# Patient Record
Sex: MALE | Race: WHITE | Employment: OTHER | URBAN - NONMETROPOLITAN AREA
[De-identification: names, ages, dates, MRNs, and addresses within clinical notes are randomized per-mention and may not be internally consistent; named-entity substitution may affect disease eponyms.]

---

## 2017-01-21 ENCOUNTER — HOSPITAL ENCOUNTER (EMERGENCY)
Age: 67
Discharge: HOME | End: 2017-01-21
Attending: EMERGENCY MEDICINE
Payer: MEDICARE

## 2017-01-21 ENCOUNTER — APPOINTMENT (OUTPATIENT)
Dept: GENERAL RADIOLOGY | Age: 67
End: 2017-01-21
Payer: MEDICARE

## 2017-01-21 VITALS
TEMPERATURE: 97.9 F | SYSTOLIC BLOOD PRESSURE: 123 MMHG | WEIGHT: 205 LBS | HEART RATE: 95 BPM | DIASTOLIC BLOOD PRESSURE: 58 MMHG | RESPIRATION RATE: 22 BRPM | OXYGEN SATURATION: 95 % | HEIGHT: 67 IN | BODY MASS INDEX: 32.18 KG/M2

## 2017-01-21 DIAGNOSIS — K56.41 FECAL IMPACTION (HCC): Primary | ICD-10-CM

## 2017-01-21 PROCEDURE — 74000 XR ABDOMEN LIMITED (KUB): CPT

## 2017-01-21 PROCEDURE — 96374 THER/PROPH/DIAG INJ IV PUSH: CPT

## 2017-01-21 PROCEDURE — 99283 EMERGENCY DEPT VISIT LOW MDM: CPT | Performed by: EMERGENCY MEDICINE

## 2017-01-21 PROCEDURE — 99283 EMERGENCY DEPT VISIT LOW MDM: CPT

## 2017-01-21 PROCEDURE — 96375 TX/PRO/DX INJ NEW DRUG ADDON: CPT

## 2017-01-21 PROCEDURE — 6360000002 HC RX W HCPCS: Performed by: EMERGENCY MEDICINE

## 2017-01-21 RX ORDER — KETOROLAC TROMETHAMINE 30 MG/ML
30 INJECTION, SOLUTION INTRAMUSCULAR; INTRAVENOUS ONCE
Status: COMPLETED | OUTPATIENT
Start: 2017-01-21 | End: 2017-01-21

## 2017-01-21 RX ORDER — MORPHINE SULFATE 4 MG/ML
4 INJECTION, SOLUTION INTRAMUSCULAR; INTRAVENOUS ONCE
Status: COMPLETED | OUTPATIENT
Start: 2017-01-21 | End: 2017-01-21

## 2017-01-21 RX ADMIN — KETOROLAC TROMETHAMINE 30 MG: 30 INJECTION, SOLUTION INTRAMUSCULAR at 13:16

## 2017-01-21 RX ADMIN — MORPHINE SULFATE 4 MG: 4 INJECTION, SOLUTION INTRAMUSCULAR; INTRAVENOUS at 13:17

## 2017-01-21 ASSESSMENT — ENCOUNTER SYMPTOMS
ABDOMINAL PAIN: 0
CONSTIPATION: 1
VOMITING: 0
NAUSEA: 0
BACK PAIN: 1

## 2017-01-21 ASSESSMENT — PAIN SCALES - GENERAL
PAINLEVEL_OUTOF10: 7
PAINLEVEL_OUTOF10: 7

## 2017-01-21 ASSESSMENT — PAIN DESCRIPTION - LOCATION: LOCATION: RECTUM

## 2017-01-21 ASSESSMENT — PAIN DESCRIPTION - PAIN TYPE: TYPE: ACUTE PAIN

## 2017-03-21 ENCOUNTER — NURSE ONLY (OUTPATIENT)
Dept: PRIMARY CARE CLINIC | Age: 67
End: 2017-03-21
Payer: MEDICARE

## 2017-03-21 DIAGNOSIS — E78.00 ELEVATED CHOLESTEROL: Primary | ICD-10-CM

## 2017-03-21 LAB
CHOLESTEROL, TOTAL: 198 MG/DL (ref 160–199)
HDLC SERPL-MCNC: 35 MG/DL (ref 55–121)
LDL CHOLESTEROL CALCULATED: 131 MG/DL
TRIGL SERPL-MCNC: 161 MG/DL (ref 150–199)

## 2017-03-21 PROCEDURE — 36415 COLL VENOUS BLD VENIPUNCTURE: CPT | Performed by: NURSE PRACTITIONER

## 2017-08-09 RX ORDER — AMLODIPINE BESYLATE 5 MG/1
TABLET ORAL
Qty: 90 TABLET | Refills: 3 | Status: SHIPPED | OUTPATIENT
Start: 2017-08-09 | End: 2018-06-04 | Stop reason: SDUPTHER

## 2017-08-09 RX ORDER — BENAZEPRIL HYDROCHLORIDE 20 MG/1
TABLET ORAL
Qty: 90 TABLET | Refills: 3 | Status: SHIPPED | OUTPATIENT
Start: 2017-08-09 | End: 2018-06-04 | Stop reason: SDUPTHER

## 2017-09-21 ENCOUNTER — OFFICE VISIT (OUTPATIENT)
Dept: PRIMARY CARE CLINIC | Age: 67
End: 2017-09-21
Payer: MEDICARE

## 2017-09-21 VITALS
OXYGEN SATURATION: 97 % | BODY MASS INDEX: 31.86 KG/M2 | TEMPERATURE: 98.3 F | DIASTOLIC BLOOD PRESSURE: 78 MMHG | HEIGHT: 67 IN | WEIGHT: 203 LBS | SYSTOLIC BLOOD PRESSURE: 132 MMHG | HEART RATE: 86 BPM

## 2017-09-21 DIAGNOSIS — Z13.6 ENCOUNTER FOR LIPID SCREENING FOR CARDIOVASCULAR DISEASE: ICD-10-CM

## 2017-09-21 DIAGNOSIS — Z12.5 SCREENING FOR PROSTATE CANCER: ICD-10-CM

## 2017-09-21 DIAGNOSIS — Z00.00 ROUTINE GENERAL MEDICAL EXAMINATION AT A HEALTH CARE FACILITY: ICD-10-CM

## 2017-09-21 DIAGNOSIS — I10 ESSENTIAL HYPERTENSION: ICD-10-CM

## 2017-09-21 DIAGNOSIS — Z13.1 SCREENING FOR DIABETES MELLITUS: ICD-10-CM

## 2017-09-21 DIAGNOSIS — R01.1 MURMUR, CARDIAC: Primary | ICD-10-CM

## 2017-09-21 DIAGNOSIS — Z13.220 ENCOUNTER FOR LIPID SCREENING FOR CARDIOVASCULAR DISEASE: ICD-10-CM

## 2017-09-21 LAB
ALBUMIN SERPL-MCNC: 4.3 G/DL (ref 3.5–5.2)
ALP BLD-CCNC: 86 U/L (ref 40–130)
ALT SERPL-CCNC: 22 U/L (ref 5–41)
ANION GAP SERPL CALCULATED.3IONS-SCNC: 13 MMOL/L (ref 7–19)
AST SERPL-CCNC: 21 U/L (ref 5–40)
BILIRUB SERPL-MCNC: 0.4 MG/DL (ref 0.2–1.2)
BUN BLDV-MCNC: 23 MG/DL (ref 8–23)
CALCIUM SERPL-MCNC: 9.5 MG/DL (ref 8.8–10.2)
CHLORIDE BLD-SCNC: 101 MMOL/L (ref 98–111)
CHOLESTEROL, TOTAL: 201 MG/DL (ref 160–199)
CO2: 25 MMOL/L (ref 22–29)
CREAT SERPL-MCNC: 0.9 MG/DL (ref 0.5–1.2)
GFR NON-AFRICAN AMERICAN: >60
GLUCOSE BLD-MCNC: 100 MG/DL (ref 74–109)
HCT VFR BLD CALC: 45 % (ref 42–52)
HDLC SERPL-MCNC: 34 MG/DL (ref 55–121)
HEMOGLOBIN: 15.4 G/DL (ref 14–18)
LDL CHOLESTEROL CALCULATED: 140 MG/DL
MCH RBC QN AUTO: 31.4 PG (ref 27–31)
MCHC RBC AUTO-ENTMCNC: 34.2 G/DL (ref 33–37)
MCV RBC AUTO: 91.8 FL (ref 80–94)
PDW BLD-RTO: 12.5 % (ref 11.5–14.5)
PLATELET # BLD: 197 K/UL (ref 130–400)
PMV BLD AUTO: 10.1 FL (ref 9.4–12.4)
POTASSIUM SERPL-SCNC: 5.2 MMOL/L (ref 3.5–5)
PROSTATE SPECIFIC ANTIGEN: 0.85 NG/ML (ref 0–4)
RBC # BLD: 4.9 M/UL (ref 4.7–6.1)
SODIUM BLD-SCNC: 139 MMOL/L (ref 136–145)
TOTAL PROTEIN: 7.9 G/DL (ref 6.6–8.7)
TRIGL SERPL-MCNC: 133 MG/DL (ref 150–199)
WBC # BLD: 7.2 K/UL (ref 4.8–10.8)

## 2017-09-21 PROCEDURE — G0439 PPPS, SUBSEQ VISIT: HCPCS | Performed by: NURSE PRACTITIONER

## 2017-09-21 PROCEDURE — 36415 COLL VENOUS BLD VENIPUNCTURE: CPT | Performed by: NURSE PRACTITIONER

## 2017-09-21 NOTE — PROGRESS NOTES
medical history including lifestyle, illnesses that may run in your family, and various assessments and screenings as appropriate. After reviewing your medical record and screening and assessments performed today your provider may have ordered immunizations, labs, imaging, and/or referrals for you. A list of these orders (if applicable) as well as your Preventive Care list are included within your After Visit Summary for your review. Other Preventive Recommendations:    · A preventive eye exam performed by an eye specialist is recommended every 1-2 years to screen for glaucoma; cataracts, macular degeneration, and other eye disorders. · A preventive dental visit is recommended every 6 months. · Try to get at least 150 minutes of exercise per week or 10,000 steps per day on a pedometer . · Order or download the FREE \"Exercise & Physical Activity: Your Everyday Guide\" from The [employer]. Call 2-672.414.5404 or search The [employer] online. · You need 9511-2310 mg of calcium and 6436-7425 IU of vitamin D per day. It is possible to meet your calcium requirement with diet alone, but a vitamin D supplement is usually necessary to meet this goal.  · When exposed to the sun, use a sunscreen that protects against both UVA and UVB radiation with an SPF of 30 or greater. Reapply every 2 to 3 hours or after sweating, drying off with a towel, or swimming. · Always wear a seat belt when traveling in a car. Always wear a helmet when riding a bicycle or motorcycle. Electronically signed by Alejandra Johnson CNP on 2017 at 9:08 AM    Medicare Annual Wellness Visit  Name: Efraín Leigh Date: 2017   MRN: [id number] Sex: Male   Age: 79 y.o.  Ethnicity: Non-/Non    : 1950 Race: [address] is here for Annual Exam (Pt needs labs)    Screenings for behavioral, psychosocial and functional/safety risks, and cognitive dysfunction are all negative except as indicated below. These results, as well as other patient data from the [address] form, are documented in Flowsheets linked to this Encounter. No Known Allergies    Prior to Visit Medications    Medication Sig Taking? Authorizing Provider   Coenzyme Q10 (CO Q 10 PO) Take by mouth Yes Historical Provider, MD   benazepril (LOTENSIN) 20 MG tablet TAKE 1 TABLET EVERY DAY Yes Pia Kumari CNP   amLODIPine (NORVASC) 5 MG tablet TAKE 1 TABLET NIGHTLY Yes Anita Ramírez CNP   metroNIDAZOLE (METROGEL) 0.75 % gel Apply topically 2 times daily. Yes Anita Ramírez CNP   Multiple Vitamins-Minerals (THERAPEUTIC MULTIVITAMIN-MINERALS) tablet Take 1 tablet by mouth daily Yes Historical Provider, MD   Vitamin D (CHOLECALCIFEROL) 1000 UNITS CAPS capsule Take 1,000 Units by mouth daily Yes Historical Provider, MD   Omega-3 Fatty Acids (FISH OIL) 300 MG CAPS Take by mouth Yes Historical Provider, MD   niacin 500 MG CR capsule Take 500 mg by mouth nightly Yes Historical Provider, MD   Glucosamine-Chondroit-Vit C-Mn (GLUCOSAMINE CHONDR 1500 COMPLX) CAPS Take 1 capsule by mouth 2 times daily Yes Historical Provider, MD   aspirin 81 MG tablet Take 81 mg by mouth daily Yes Historical Provider, MD   Beta Sitosterol 40 % POWD by Does not apply route  Historical Provider, MD       Past Medical History:   Diagnosis Date    Chronic neck pain     HTN (hypertension)      History reviewed. No pertinent surgical history.     Family History   Problem Relation Age of Onset    Cancer Mother      lymphoma    Heart Disease Father        CareTeam (Including outside providers/suppliers regularly involved in providing care):   Patient Care Team:  Anita Ramírez CNP as PCP - General (Family Nurse Practitioner)    Wt Readings from Last 3 Encounters:   09/21/17 203 lb (92.1 kg)   01/21/17 205 lb (93 kg)   09/19/16 205 lb (93 kg)     Vitals:    09/21/17 0829   BP: 132/78   Site: Left Arm   Position: Sitting

## 2017-09-21 NOTE — MR AVS SNAPSHOT
After Visit Summary             Rohit Em   2017 8:30 AM   Office Visit    Description:  Male : 1950   Provider:  Elysia Corona CNP   Department:  [facility]              Your Follow-Up and Future Appointments         Below is a list of your follow-up and future appointments. This may not be a complete list as you may have made appointments directly with providers that we are not aware of or your providers may have made some for you. Please call your providers to confirm appointments. It is important to keep your appointments. Please bring your current insurance card, photo ID, co-pay, and all medication bottles to your appointment. If self-pay, payment is expected at the time of service. Your To-Do List     Future Appointments Provider Department Dept Phone    2018 8:30 AM Elysia Corona, [facility] [place] [address] [phone]    If this is a sports or school physical please bring the physical form with you. Follow-Up    Return in 1 year (on 2018) for Medicare Annual Wellness Visit in 1 year. Information from Your Visit        Department     Name Address Phone Fax    [address] [place] [address] [phone]      You Were Seen for:         Comments    Murmur, cardiac   [[id number]]         Vital Signs     Blood Pressure Pulse Temperature Height Weight Oxygen Saturation    132/78 (Site: Left Arm, Position: Sitting) 86 98.3 °F (36.8 °C) (Temporal) 5' 7\" (1.702 m) 203 lb (92.1 kg) 97%    Body Mass Index Smoking Status                31.79 kg/m2 Former Smoker          Additional Information about your Body Mass Index (BMI)           Your BMI as listed above is considered obese (30 or more). BMI is an estimate of body fat, calculated from your height and weight.   The higher your BMI, the greater your risk of heart disease, high blood pressure, It is possible to meet your calcium requirement with diet alone, but a vitamin D supplement is usually necessary to meet this goal.  · When exposed to the sun, use a sunscreen that protects against both UVA and UVB radiation with an SPF of 30 or greater. Reapply every 2 to 3 hours or after sweating, drying off with a towel, or swimming. · Always wear a seat belt when traveling in a car. Always wear a helmet when riding a bicycle or motorcycle. Medications and Orders      Your Current Medications Are              Coenzyme Q10 (CO Q 10 PO) Take by mouth    benazepril (LOTENSIN) 20 MG tablet TAKE 1 TABLET EVERY DAY    amLODIPine (NORVASC) 5 MG tablet TAKE 1 TABLET NIGHTLY    metroNIDAZOLE (METROGEL) 0.75 % gel Apply topically 2 times daily.     Multiple Vitamins-Minerals (THERAPEUTIC MULTIVITAMIN-MINERALS) tablet Take 1 tablet by mouth daily    Vitamin D (CHOLECALCIFEROL) 1000 UNITS CAPS capsule Take 1,000 Units by mouth daily    Omega-3 Fatty Acids (FISH OIL) 300 MG CAPS Take by mouth    niacin 500 MG CR capsule Take 500 mg by mouth nightly    Glucosamine-Chondroit-Vit C-Mn (GLUCOSAMINE CHONDR 1500 COMPLX) CAPS Take 1 capsule by mouth 2 times daily    aspirin 81 MG tablet Take 81 mg by mouth daily    Beta Sitosterol 40 % POWD by Does not apply route      Allergies           No Known Allergies      We Ordered/Performed the following           CBC     Comprehensive Metabolic Panel     Lipid Panel     Psa screening          Additional Information        Basic Information     Date Of Birth Sex Race Ethnicity Preferred Language    1950 Male Unavailable Non-/Non  English      Problem List as of 9/21/2017                 Essential hypertension    Murmur, cardiac      Preventive Care        Date Due    One-time abdominal aortic aneurism (AAA) screening is recommended for all men between the age of 73-68 who have ever smoked 1950 Pneumococcal Vaccines (two) for all adults aged 72 and over (1 of 2 - PCV13) 9/1/2015    Yearly Flu Vaccine (1) 9/1/2017    Tetanus Combination Vaccine (1 - Tdap) 9/15/2020 (Originally 9/1/1969)    Hepatitis C screening is recommended for all adults regardless of risk factors born between [facility] at least once (lifetime) who have never been tested. 9/17/2020 (Originally 1950)    Cholesterol Screening 3/21/2022    Colonoscopy 3/17/2026            [employer] Signup           Our records indicate that you have an active [employer] account. You can view your After Visit Summary by going to [email or web address] and logging in with your FFFavs username and password. If you don't have a F[employer]avs username and password but a parent or guardian has access to your record, the parent or guardian should login with their own FFFavs username and password and access your record to view the After Visit Summary. Additional Information  If you have questions, please contact the physician practice where you receive care. Remember, FFFavs is NOT to be used for urgent needs. For medical emergencies, dial 911. For questions regarding your FFFavs account call [phone]. If you have a clinical question, please call your doctor's office.

## 2017-09-21 NOTE — PATIENT INSTRUCTIONS
Will call for ultram for chronic back pain if flare up. Personalized Preventive Plan for Tomi Dunn - 9/21/2017  Medicare offers a range of preventive health benefits. Some of the tests and screenings are paid in full while other may be subject to a deductible, co-insurance, and/or copay. Some of these benefits include a comprehensive review of your medical history including lifestyle, illnesses that may run in your family, and various assessments and screenings as appropriate. After reviewing your medical record and screening and assessments performed today your provider may have ordered immunizations, labs, imaging, and/or referrals for you. A list of these orders (if applicable) as well as your Preventive Care list are included within your After Visit Summary for your review. Other Preventive Recommendations:    · A preventive eye exam performed by an eye specialist is recommended every 1-2 years to screen for glaucoma; cataracts, macular degeneration, and other eye disorders. · A preventive dental visit is recommended every 6 months. · Try to get at least 150 minutes of exercise per week or 10,000 steps per day on a pedometer . · Order or download the FREE \"Exercise & Physical Activity: Your Everyday Guide\" from The [employer]. Call 1-940.173.6099 or search The [employer] online. · You need 1432-1913 mg of calcium and 9100-9635 IU of vitamin D per day. It is possible to meet your calcium requirement with diet alone, but a vitamin D supplement is usually necessary to meet this goal.  · When exposed to the sun, use a sunscreen that protects against both UVA and UVB radiation with an SPF of 30 or greater. Reapply every 2 to 3 hours or after sweating, drying off with a towel, or swimming. · Always wear a seat belt when traveling in a car. Always wear a helmet when riding a bicycle or motorcycle.

## 2018-06-05 RX ORDER — AMLODIPINE BESYLATE 5 MG/1
TABLET ORAL
Qty: 90 TABLET | Refills: 3 | Status: SHIPPED | OUTPATIENT
Start: 2018-06-05 | End: 2019-03-25 | Stop reason: SDUPTHER

## 2018-06-05 RX ORDER — BENAZEPRIL HYDROCHLORIDE 20 MG/1
TABLET ORAL
Qty: 90 TABLET | Refills: 3 | Status: SHIPPED | OUTPATIENT
Start: 2018-06-05 | End: 2019-03-25 | Stop reason: SDUPTHER

## 2018-10-02 ENCOUNTER — OFFICE VISIT (OUTPATIENT)
Dept: PRIMARY CARE CLINIC | Age: 68
End: 2018-10-02
Payer: MEDICARE

## 2018-10-02 VITALS
OXYGEN SATURATION: 98 % | RESPIRATION RATE: 16 BRPM | TEMPERATURE: 97.3 F | HEIGHT: 67 IN | WEIGHT: 196 LBS | HEART RATE: 74 BPM | SYSTOLIC BLOOD PRESSURE: 132 MMHG | BODY MASS INDEX: 30.76 KG/M2 | DIASTOLIC BLOOD PRESSURE: 80 MMHG

## 2018-10-02 DIAGNOSIS — Z12.5 PROSTATE CANCER SCREENING: Primary | ICD-10-CM

## 2018-10-02 DIAGNOSIS — R01.1 MURMUR, CARDIAC: ICD-10-CM

## 2018-10-02 DIAGNOSIS — Z00.00 ROUTINE GENERAL MEDICAL EXAMINATION AT A HEALTH CARE FACILITY: ICD-10-CM

## 2018-10-02 DIAGNOSIS — Z13.6 ENCOUNTER FOR LIPID SCREENING FOR CARDIOVASCULAR DISEASE: ICD-10-CM

## 2018-10-02 DIAGNOSIS — Z13.220 ENCOUNTER FOR LIPID SCREENING FOR CARDIOVASCULAR DISEASE: ICD-10-CM

## 2018-10-02 DIAGNOSIS — I10 ESSENTIAL HYPERTENSION: ICD-10-CM

## 2018-10-02 DIAGNOSIS — Z13.1 SCREENING FOR DIABETES MELLITUS: ICD-10-CM

## 2018-10-02 LAB
ALBUMIN SERPL-MCNC: 4.5 G/DL (ref 3.5–5.2)
ALP BLD-CCNC: 81 U/L (ref 40–130)
ALT SERPL-CCNC: 20 U/L (ref 5–41)
ANION GAP SERPL CALCULATED.3IONS-SCNC: 12 MMOL/L (ref 7–19)
AST SERPL-CCNC: 25 U/L (ref 5–40)
BILIRUB SERPL-MCNC: 0.4 MG/DL (ref 0.2–1.2)
BUN BLDV-MCNC: 20 MG/DL (ref 8–23)
CALCIUM SERPL-MCNC: 9.7 MG/DL (ref 8.8–10.2)
CHLORIDE BLD-SCNC: 103 MMOL/L (ref 98–111)
CHOLESTEROL, TOTAL: 211 MG/DL (ref 160–199)
CO2: 25 MMOL/L (ref 22–29)
CREAT SERPL-MCNC: 0.9 MG/DL (ref 0.5–1.2)
GFR NON-AFRICAN AMERICAN: >60
GLUCOSE BLD-MCNC: 97 MG/DL (ref 74–109)
HDLC SERPL-MCNC: 36 MG/DL (ref 55–121)
LDL CHOLESTEROL CALCULATED: 143 MG/DL
POTASSIUM SERPL-SCNC: 4.5 MMOL/L (ref 3.5–5)
PROSTATE SPECIFIC ANTIGEN: 0.95 NG/ML (ref 0–4)
SODIUM BLD-SCNC: 140 MMOL/L (ref 136–145)
TOTAL PROTEIN: 7.9 G/DL (ref 6.6–8.7)
TRIGL SERPL-MCNC: 159 MG/DL (ref 0–149)

## 2018-10-02 PROCEDURE — 36415 COLL VENOUS BLD VENIPUNCTURE: CPT | Performed by: NURSE PRACTITIONER

## 2018-10-02 PROCEDURE — 4040F PNEUMOC VAC/ADMIN/RCVD: CPT | Performed by: NURSE PRACTITIONER

## 2018-10-02 PROCEDURE — G8484 FLU IMMUNIZE NO ADMIN: HCPCS | Performed by: NURSE PRACTITIONER

## 2018-10-02 PROCEDURE — G0439 PPPS, SUBSEQ VISIT: HCPCS | Performed by: NURSE PRACTITIONER

## 2018-10-02 ASSESSMENT — PATIENT HEALTH QUESTIONNAIRE - PHQ9
2. FEELING DOWN, DEPRESSED OR HOPELESS: 0
SUM OF ALL RESPONSES TO PHQ9 QUESTIONS 1 & 2: 0
SUM OF ALL RESPONSES TO PHQ QUESTIONS 1-9: 0
SUM OF ALL RESPONSES TO PHQ QUESTIONS 1-9: 0
1. LITTLE INTEREST OR PLEASURE IN DOING THINGS: 0

## 2018-10-02 ASSESSMENT — ANXIETY QUESTIONNAIRES: GAD7 TOTAL SCORE: 0

## 2018-10-02 ASSESSMENT — LIFESTYLE VARIABLES: HOW OFTEN DO YOU HAVE A DRINK CONTAINING ALCOHOL: 0

## 2018-10-02 NOTE — PROGRESS NOTES
Medicare Annual Wellness Visit  Name: Yashira Omalley Date: 10/2/2018   MRN: [id number] Sex: Male   Age: 76 y.o. Ethnicity: Non-/Non    : 1950 Race: [address] is here for Annual Exam (patient has been fasting.)  Patient complains of low back pain that comes and goes. He has tried anti-inflammatories for this in the past.He has not had any recent imaging. He denies radiating back pain. Screenings for behavioral, psychosocial and functional/safety risks, and cognitive dysfunction are all negative except as indicated below. These results, as well as other patient data from the [address] form, are documented in Flowsheets linked to this Encounter. No Known Allergies  Prior to Visit Medications    Medication Sig Taking? Authorizing Provider   amLODIPine (NORVASC) 5 MG tablet TAKE 1 TABLET NIGHTLY Yes MICHELA Clemens CNP   benazepril (LOTENSIN) 20 MG tablet TAKE 1 TABLET EVERY DAY Yes MICHELA Clemens CNP   Coenzyme Q10 (CO Q 10 PO) Take by mouth Yes Historical Provider, MD   metroNIDAZOLE (METROGEL) 0.75 % gel Apply topically 2 times daily.  Yes MICHELA Clemens CNP   Multiple Vitamins-Minerals (THERAPEUTIC MULTIVITAMIN-MINERALS) tablet Take 1 tablet by mouth daily Yes Historical Provider, MD   Vitamin D (CHOLECALCIFEROL) 1000 UNITS CAPS capsule Take 1,000 Units by mouth daily Yes Historical Provider, MD   Omega-3 Fatty Acids (FISH OIL) 300 MG CAPS Take by mouth Yes Historical Provider, MD   niacin 500 MG CR capsule Take 500 mg by mouth nightly Yes Historical Provider, MD   Beta Sitosterol 40 % POWD by Does not apply route Yes Historical Provider, MD   Glucosamine-Chondroit-Vit C-Mn (GLUCOSAMINE CHONDR 1500 COMPLX) CAPS Take 1 capsule by mouth 2 times daily Yes Historical Provider, MD   aspirin 81 MG tablet Take 81 mg by mouth daily Yes Historical Provider, MD     Past Medical History:   Diagnosis Date    Chronic neck pain    

## 2019-03-26 RX ORDER — BENAZEPRIL HYDROCHLORIDE 20 MG/1
TABLET ORAL
Qty: 90 TABLET | Refills: 3 | Status: SHIPPED | OUTPATIENT
Start: 2019-03-26 | End: 2020-04-13

## 2019-03-26 RX ORDER — AMLODIPINE BESYLATE 5 MG/1
TABLET ORAL
Qty: 90 TABLET | Refills: 3 | Status: SHIPPED | OUTPATIENT
Start: 2019-03-26 | End: 2020-04-13

## 2019-09-18 ENCOUNTER — PATIENT MESSAGE (OUTPATIENT)
Dept: PRIMARY CARE CLINIC | Age: 69
End: 2019-09-18

## 2019-09-18 RX ORDER — NYSTATIN 100000 U/G
OINTMENT TOPICAL
Qty: 1 TUBE | Refills: 0 | Status: SHIPPED | OUTPATIENT
Start: 2019-09-18 | End: 2019-10-04

## 2019-10-04 ENCOUNTER — OFFICE VISIT (OUTPATIENT)
Dept: PRIMARY CARE CLINIC | Age: 69
End: 2019-10-04
Payer: MEDICARE

## 2019-10-04 VITALS
HEIGHT: 67 IN | BODY MASS INDEX: 29.66 KG/M2 | HEART RATE: 87 BPM | TEMPERATURE: 97.9 F | WEIGHT: 189 LBS | SYSTOLIC BLOOD PRESSURE: 130 MMHG | RESPIRATION RATE: 16 BRPM | DIASTOLIC BLOOD PRESSURE: 82 MMHG | OXYGEN SATURATION: 99 %

## 2019-10-04 DIAGNOSIS — Z00.00 ROUTINE GENERAL MEDICAL EXAMINATION AT A HEALTH CARE FACILITY: Primary | ICD-10-CM

## 2019-10-04 DIAGNOSIS — Z13.1 SCREENING FOR DIABETES MELLITUS: ICD-10-CM

## 2019-10-04 DIAGNOSIS — I10 ESSENTIAL HYPERTENSION: ICD-10-CM

## 2019-10-04 DIAGNOSIS — Z13.220 ENCOUNTER FOR SCREENING FOR LIPID DISORDER: ICD-10-CM

## 2019-10-04 DIAGNOSIS — H93.13 TINNITUS OF BOTH EARS: ICD-10-CM

## 2019-10-04 DIAGNOSIS — R01.1 MURMUR, CARDIAC: ICD-10-CM

## 2019-10-04 DIAGNOSIS — Z12.5 PROSTATE CANCER SCREENING: ICD-10-CM

## 2019-10-04 DIAGNOSIS — H90.2 CONDUCTIVE HEARING LOSS, UNSPECIFIED LATERALITY: ICD-10-CM

## 2019-10-04 LAB
ALBUMIN SERPL-MCNC: 4.6 G/DL (ref 3.5–5.2)
ALP BLD-CCNC: 84 U/L (ref 40–130)
ALT SERPL-CCNC: 23 U/L (ref 5–41)
ANION GAP SERPL CALCULATED.3IONS-SCNC: 15 MMOL/L (ref 7–19)
AST SERPL-CCNC: 23 U/L (ref 5–40)
BILIRUB SERPL-MCNC: 0.4 MG/DL (ref 0.2–1.2)
BUN BLDV-MCNC: 20 MG/DL (ref 8–23)
CALCIUM SERPL-MCNC: 9.6 MG/DL (ref 8.8–10.2)
CHLORIDE BLD-SCNC: 103 MMOL/L (ref 98–111)
CHOLESTEROL, TOTAL: 186 MG/DL (ref 160–199)
CO2: 22 MMOL/L (ref 22–29)
CREAT SERPL-MCNC: 0.9 MG/DL (ref 0.5–1.2)
GFR NON-AFRICAN AMERICAN: >60
GLUCOSE BLD-MCNC: 93 MG/DL (ref 74–109)
HDLC SERPL-MCNC: 37 MG/DL (ref 55–121)
LDL CHOLESTEROL CALCULATED: 116 MG/DL
POTASSIUM SERPL-SCNC: 4.3 MMOL/L (ref 3.5–5)
PROSTATE SPECIFIC ANTIGEN: 0.84 NG/ML (ref 0–4)
SODIUM BLD-SCNC: 140 MMOL/L (ref 136–145)
TOTAL PROTEIN: 8 G/DL (ref 6.6–8.7)
TRIGL SERPL-MCNC: 164 MG/DL (ref 0–149)

## 2019-10-04 PROCEDURE — 4040F PNEUMOC VAC/ADMIN/RCVD: CPT | Performed by: NURSE PRACTITIONER

## 2019-10-04 PROCEDURE — 36415 COLL VENOUS BLD VENIPUNCTURE: CPT | Performed by: NURSE PRACTITIONER

## 2019-10-04 PROCEDURE — 1123F ACP DISCUSS/DSCN MKR DOCD: CPT | Performed by: NURSE PRACTITIONER

## 2019-10-04 PROCEDURE — 3017F COLORECTAL CA SCREEN DOC REV: CPT | Performed by: NURSE PRACTITIONER

## 2019-10-04 PROCEDURE — G8484 FLU IMMUNIZE NO ADMIN: HCPCS | Performed by: NURSE PRACTITIONER

## 2019-10-04 PROCEDURE — G0439 PPPS, SUBSEQ VISIT: HCPCS | Performed by: NURSE PRACTITIONER

## 2019-10-04 RX ORDER — HYDROCODONE BITARTRATE AND ACETAMINOPHEN 7.5; 325 MG/1; MG/1
1 TABLET ORAL EVERY 6 HOURS PRN
COMMUNITY
End: 2020-10-07

## 2019-10-04 ASSESSMENT — PATIENT HEALTH QUESTIONNAIRE - PHQ9
SUM OF ALL RESPONSES TO PHQ QUESTIONS 1-9: 0
SUM OF ALL RESPONSES TO PHQ QUESTIONS 1-9: 0

## 2019-10-04 ASSESSMENT — LIFESTYLE VARIABLES: HOW OFTEN DO YOU HAVE A DRINK CONTAINING ALCOHOL: 0

## 2019-10-07 ENCOUNTER — PATIENT MESSAGE (OUTPATIENT)
Dept: PRIMARY CARE CLINIC | Age: 69
End: 2019-10-07

## 2019-10-07 RX ORDER — FLUCONAZOLE 150 MG/1
TABLET ORAL
Qty: 2 TABLET | Refills: 0 | Status: SHIPPED | OUTPATIENT
Start: 2019-10-07 | End: 2022-02-24

## 2019-10-14 ENCOUNTER — PROCEDURE VISIT (OUTPATIENT)
Dept: OTOLARYNGOLOGY | Age: 69
End: 2019-10-14
Payer: MEDICARE

## 2019-10-14 DIAGNOSIS — H90.3 SENSORINEURAL HEARING LOSS (SNHL) OF BOTH EARS: ICD-10-CM

## 2019-10-14 DIAGNOSIS — H93.13 TINNITUS OF BOTH EARS: Primary | ICD-10-CM

## 2019-10-14 PROCEDURE — 92550 TYMPANOMETRY & REFLEX THRESH: CPT | Performed by: AUDIOLOGIST

## 2019-10-14 PROCEDURE — 92557 COMPREHENSIVE HEARING TEST: CPT | Performed by: AUDIOLOGIST

## 2020-04-13 RX ORDER — AMLODIPINE BESYLATE 5 MG/1
TABLET ORAL
Qty: 90 TABLET | Refills: 3 | Status: SHIPPED | OUTPATIENT
Start: 2020-04-13 | End: 2020-10-07 | Stop reason: SDUPTHER

## 2020-04-13 RX ORDER — BENAZEPRIL HYDROCHLORIDE 20 MG/1
TABLET ORAL
Qty: 90 TABLET | Refills: 3 | Status: SHIPPED | OUTPATIENT
Start: 2020-04-13 | End: 2020-10-07 | Stop reason: SDUPTHER

## 2020-10-07 ENCOUNTER — OFFICE VISIT (OUTPATIENT)
Dept: PRIMARY CARE CLINIC | Age: 70
End: 2020-10-07
Payer: MEDICARE

## 2020-10-07 VITALS
HEIGHT: 67 IN | WEIGHT: 192.6 LBS | RESPIRATION RATE: 16 BRPM | DIASTOLIC BLOOD PRESSURE: 84 MMHG | OXYGEN SATURATION: 99 % | BODY MASS INDEX: 30.23 KG/M2 | SYSTOLIC BLOOD PRESSURE: 152 MMHG | TEMPERATURE: 98.8 F | HEART RATE: 92 BPM

## 2020-10-07 LAB
REASON FOR REJECTION: NORMAL
REJECTED TEST: NORMAL

## 2020-10-07 PROCEDURE — 1123F ACP DISCUSS/DSCN MKR DOCD: CPT | Performed by: NURSE PRACTITIONER

## 2020-10-07 PROCEDURE — G0439 PPPS, SUBSEQ VISIT: HCPCS | Performed by: NURSE PRACTITIONER

## 2020-10-07 PROCEDURE — G8484 FLU IMMUNIZE NO ADMIN: HCPCS | Performed by: NURSE PRACTITIONER

## 2020-10-07 PROCEDURE — 3017F COLORECTAL CA SCREEN DOC REV: CPT | Performed by: NURSE PRACTITIONER

## 2020-10-07 PROCEDURE — 4040F PNEUMOC VAC/ADMIN/RCVD: CPT | Performed by: NURSE PRACTITIONER

## 2020-10-07 RX ORDER — AMLODIPINE BESYLATE 5 MG/1
TABLET ORAL
Qty: 90 TABLET | Refills: 3 | Status: SHIPPED | OUTPATIENT
Start: 2020-10-07 | End: 2021-05-24

## 2020-10-07 RX ORDER — BENAZEPRIL HYDROCHLORIDE 20 MG/1
TABLET ORAL
Qty: 90 TABLET | Refills: 3 | Status: SHIPPED | OUTPATIENT
Start: 2020-10-07 | End: 2021-05-24

## 2020-10-07 ASSESSMENT — PATIENT HEALTH QUESTIONNAIRE - PHQ9
2. FEELING DOWN, DEPRESSED OR HOPELESS: 0
SUM OF ALL RESPONSES TO PHQ QUESTIONS 1-9: 0
SUM OF ALL RESPONSES TO PHQ9 QUESTIONS 1 & 2: 0
1. LITTLE INTEREST OR PLEASURE IN DOING THINGS: 0
SUM OF ALL RESPONSES TO PHQ QUESTIONS 1-9: 0

## 2020-10-07 ASSESSMENT — LIFESTYLE VARIABLES: HOW OFTEN DO YOU HAVE A DRINK CONTAINING ALCOHOL: 0

## 2020-10-07 NOTE — PROGRESS NOTES
Medicare Annual Wellness Visit  Name: Melani Greenwood Date: 10/7/2020   MRN: [id number] Sex: Male   Age: 79 y.o. Ethnicity: Non-/Non    : 1950 Race: [address] is here for Medicare AWV (Pt is here for MAW and fasting labs )  doing well bp a little high because not taken meds yet  Screenings for behavioral, psychosocial and functional/safety risks, and cognitive dysfunction are all negative except as indicated below. These results, as well as other patient data from the [address] form, are documented in Flowsheets linked to this Encounter. No Known Allergies    Prior to Visit Medications    Medication Sig Taking? Authorizing Provider   amLODIPine (NORVASC) 5 MG tablet TAKE 1 TABLET NIGHTLY Yes MICHELA Patel CNP   benazepril (LOTENSIN) 20 MG tablet TAKE 1 TABLET EVERY DAY Yes MICHELA Patel CNP   fluconazole (DIFLUCAN) 150 MG tablet Take one now and repeat in 1 wk if needed Yes MICHELA Patel CNP   Coenzyme Q10 (CO Q 10 PO) Take by mouth Yes Historical Provider, MD   Multiple Vitamins-Minerals (THERAPEUTIC MULTIVITAMIN-MINERALS) tablet Take 1 tablet by mouth daily Yes Historical Provider, MD   Vitamin D (CHOLECALCIFEROL) 1000 UNITS CAPS capsule Take 1,000 Units by mouth daily Yes Historical Provider, MD   Omega-3 Fatty Acids (FISH OIL) 300 MG CAPS Take by mouth Yes Historical Provider, MD   niacin 500 MG CR capsule Take 500 mg by mouth nightly Yes Historical Provider, MD   Glucosamine-Chondroit-Vit C-Mn (GLUCOSAMINE CHONDR 1500 COMPLX) CAPS Take 1 capsule by mouth 2 times daily Yes Historical Provider, MD   aspirin 81 MG tablet Take 81 mg by mouth daily Yes Historical Provider, MD       Past Medical History:   Diagnosis Date    Chronic neck pain     HTN (hypertension)        History reviewed. No pertinent surgical history.     Family History   Problem Relation Age of Onset    Cancer Mother         lymphoma    Heart Disease Father        CareTeam (Including outside providers/suppliers regularly involved in providing care):   Patient Care Team:  MICHELA Concepcion CNP as PCP - General (Family Nurse Practitioner)  MICHELA Concepcion CNP as PCP - [address] [place] [address] Provider  Mariella Everett as Audiologist (Audiology)    Wt Readings from Last 3 Encounters:   10/07/20 192 lb 9.6 oz (87.4 kg)   10/04/19 189 lb (85.7 kg)   10/02/18 196 lb (88.9 kg)     Vitals:    10/07/20 0828   BP: (!) 152/84   Site: Left Upper Arm   Position: Sitting   Cuff Size: Medium Adult   Pulse: 92   Resp: 16   Temp: 98.8 °F (37.1 °C)   TempSrc: Temporal   SpO2: 99%   Weight: 192 lb 9.6 oz (87.4 kg)   Height: 5' 7\" (1.702 m)     Body mass index is 30.17 kg/m². Based upon direct observation of the patient, evaluation of cognition reveals recent and remote memory intact.     General Appearance: alert and oriented to person, place and time, well developed and well- nourished, in no acute distress  Skin: warm and dry, no rash or erythema  Head: normocephalic and atraumatic  Eyes: pupils equal, round, and reactive to light, extraocular eye movements intact, conjunctivae normal  ENT: tympanic membrane, external ear and ear canal normal bilaterally, nose without deformity, nasal mucosa and turbinates normal without polyps  Neck: supple and non-tender without mass, no thyromegaly or thyroid nodules, no cervical lymphadenopathy  Pulmonary/Chest: clear to auscultation bilaterally- no wheezes, rales or rhonchi, normal air movement, no respiratory distress  Cardiovascular: normal rate, regular rhythm, normal S1 and S2, no murmurs, rubs, clicks, or gallops, distal pulses intact, no carotid bruits  Abdomen: soft, non-tender, non-distended, normal bowel sounds, no masses or organomegaly  Extremities: no cyanosis, clubbing or edema  Musculoskeletal: normal range of motion, no joint swelling, deformity or tenderness  Neurologic: reflexes normal and symmetric, no cranial nerve deficit, gait, coordination and speech normal  Ceruminosis is noted. Wax is removed by syringing and manual debridement. Instructions for home care to prevent wax buildup are given. Patient's complete Health Risk Assessment and screening values have been reviewed and are found in Flowsheets. The following problems were reviewed today and where indicated follow up appointments were made and/or referrals ordered. Positive Risk Factor Screenings with Interventions:     Health Habits/Nutrition:  Health Habits/Nutrition  Do you exercise for at least 20 minutes 2-3 times per week?: (!) No  Have you lost any weight without trying in the past 3 months?: No  Do you eat fewer than 2 meals per day?: No  Have you seen a dentist within the past year?: (!) No  Body mass index: (!) 30.16  Health Habits/Nutrition Interventions:  · Inadequate physical activity:  patient is not ready to increase his/her physical activity level at this time he does ,mow the yard and get out and work outside. Hearing/Vision:  No exam data present  Hearing/Vision  Do you or your family notice any trouble with your hearing?: No  Do you have difficulty driving, watching TV, or doing any of your daily activities because of your eyesight?: No  Have you had an eye exam within the past year?: (!) No  Hearing/Vision Interventions:  · Vision concerns:  patient encouraged to make appointment with his/her eye specialist    Personalized Preventive Plan   Current Health Maintenance Status    There is no immunization history on file for this patient.      Health Maintenance   Topic Date Due    AAA screen  1950    Hepatitis C screen  1950    Annual Wellness Visit (AWV)  06/23/2019    Potassium monitoring  10/04/2020    Creatinine monitoring  10/04/2020    Pneumococcal 65+ years Vaccine (1 of 1 - PPSV23) 07/20/2021 (Originally 9/1/2015)    DTaP/Tdap/Td vaccine (1 - Tdap) 10/07/2021 (Originally 9/1/1969)    Flu vaccine (1) 10/07/2021 (Originally 9/1/2020)    Shingles Vaccine (1 of 2) 10/07/2021 (Originally 9/1/2000)    Colon Cancer Screen FIT/FOBT  08/20/2021    Lipid screen  10/04/2024    Hepatitis A vaccine  Aged Out    Hepatitis B vaccine  Aged Out    Hib vaccine  Aged Out    Meningococcal (ACWY) vaccine  Aged Out     Recommendations for Porphyrio Due: see orders and patient instructions/AVS.  . Recommended screening schedule for the next 5-10 years is provided to the patient in written form: see Patient Instructions/AVS.    There are no diagnoses linked to this encounter.

## 2020-10-07 NOTE — PATIENT INSTRUCTIONS
Personalized Preventive Plan for Indy Baig - 10/7/2020  Medicare offers a range of preventive health benefits. Some of the tests and screenings are paid in full while other may be subject to a deductible, co-insurance, and/or copay. Some of these benefits include a comprehensive review of your medical history including lifestyle, illnesses that may run in your family, and various assessments and screenings as appropriate. After reviewing your medical record and screening and assessments performed today your provider may have ordered immunizations, labs, imaging, and/or referrals for you. A list of these orders (if applicable) as well as your Preventive Care list are included within your After Visit Summary for your review. Other Preventive Recommendations:    · A preventive eye exam performed by an eye specialist is recommended every 1-2 years to screen for glaucoma; cataracts, macular degeneration, and other eye disorders. · A preventive dental visit is recommended every 6 months. · Try to get at least 150 minutes of exercise per week or 10,000 steps per day on a pedometer . · Order or download the FREE \"Exercise & Physical Activity: Your Everyday Guide\" from The [employer]. Call 9-882.950.9789 or search The [employer] online. · You need 6723-3747 mg of calcium and 7623-8025 IU of vitamin D per day. It is possible to meet your calcium requirement with diet alone, but a vitamin D supplement is usually necessary to meet this goal.  · When exposed to the sun, use a sunscreen that protects against both UVA and UVB radiation with an SPF of 30 or greater. Reapply every 2 to 3 hours or after sweating, drying off with a towel, or swimming. · Always wear a seat belt when traveling in a car. Always wear a helmet when riding a bicycle or motorcycle.

## 2020-10-12 ENCOUNTER — NURSE ONLY (OUTPATIENT)
Dept: PRIMARY CARE CLINIC | Age: 70
End: 2020-10-12
Payer: MEDICARE

## 2020-10-12 LAB
ALBUMIN SERPL-MCNC: 4.6 G/DL (ref 3.5–5.2)
ALP BLD-CCNC: 78 U/L (ref 40–130)
ALT SERPL-CCNC: 21 U/L (ref 5–41)
ANION GAP SERPL CALCULATED.3IONS-SCNC: 20 MMOL/L (ref 7–19)
AST SERPL-CCNC: 21 U/L (ref 5–40)
BILIRUB SERPL-MCNC: 0.4 MG/DL (ref 0.2–1.2)
BUN BLDV-MCNC: 17 MG/DL (ref 8–23)
CALCIUM SERPL-MCNC: 9.4 MG/DL (ref 8.8–10.2)
CHLORIDE BLD-SCNC: 102 MMOL/L (ref 98–111)
CHOLESTEROL, TOTAL: 204 MG/DL (ref 160–199)
CO2: 20 MMOL/L (ref 22–29)
CREAT SERPL-MCNC: 0.8 MG/DL (ref 0.5–1.2)
GFR AFRICAN AMERICAN: >59
GFR NON-AFRICAN AMERICAN: >60
GLUCOSE BLD-MCNC: 95 MG/DL (ref 74–109)
HDLC SERPL-MCNC: 38 MG/DL (ref 55–121)
LDL CHOLESTEROL CALCULATED: 140 MG/DL
POTASSIUM SERPL-SCNC: 4.3 MMOL/L (ref 3.5–5)
PROSTATE SPECIFIC ANTIGEN: 1.1 NG/ML (ref 0–4)
SODIUM BLD-SCNC: 142 MMOL/L (ref 136–145)
TOTAL PROTEIN: 7.9 G/DL (ref 6.6–8.7)
TRIGL SERPL-MCNC: 129 MG/DL (ref 0–149)

## 2020-10-12 PROCEDURE — 36415 COLL VENOUS BLD VENIPUNCTURE: CPT | Performed by: NURSE PRACTITIONER

## 2021-05-24 RX ORDER — AMLODIPINE BESYLATE 5 MG/1
TABLET ORAL
Qty: 90 TABLET | Refills: 3 | Status: ON HOLD | OUTPATIENT
Start: 2021-05-24 | End: 2022-05-18 | Stop reason: HOSPADM

## 2021-05-24 RX ORDER — BENAZEPRIL HYDROCHLORIDE 20 MG/1
TABLET ORAL
Qty: 90 TABLET | Refills: 3 | Status: ON HOLD | OUTPATIENT
Start: 2021-05-24 | End: 2022-05-18 | Stop reason: HOSPADM

## 2021-08-11 RX ORDER — NYSTATIN 100000 U/G
OINTMENT TOPICAL
Qty: 1 TUBE | Refills: 1 | Status: SHIPPED | OUTPATIENT
Start: 2021-08-11 | End: 2021-10-08 | Stop reason: ALTCHOICE

## 2021-09-22 DIAGNOSIS — Z91.89 AT INCREASED RISK OF EXPOSURE TO COVID-19 VIRUS: ICD-10-CM

## 2021-10-08 ENCOUNTER — OFFICE VISIT (OUTPATIENT)
Dept: PRIMARY CARE CLINIC | Age: 71
End: 2021-10-08
Payer: MEDICARE

## 2021-10-08 VITALS
DIASTOLIC BLOOD PRESSURE: 80 MMHG | SYSTOLIC BLOOD PRESSURE: 160 MMHG | HEIGHT: 67 IN | OXYGEN SATURATION: 99 % | TEMPERATURE: 96.1 F | BODY MASS INDEX: 28.56 KG/M2 | WEIGHT: 182 LBS | HEART RATE: 93 BPM

## 2021-10-08 DIAGNOSIS — Z13.1 SCREENING FOR DIABETES MELLITUS: ICD-10-CM

## 2021-10-08 DIAGNOSIS — Z00.00 ROUTINE GENERAL MEDICAL EXAMINATION AT A HEALTH CARE FACILITY: Primary | ICD-10-CM

## 2021-10-08 DIAGNOSIS — I10 ESSENTIAL HYPERTENSION: ICD-10-CM

## 2021-10-08 DIAGNOSIS — Z12.5 SCREENING PSA (PROSTATE SPECIFIC ANTIGEN): ICD-10-CM

## 2021-10-08 DIAGNOSIS — R63.4 WEIGHT LOSS: ICD-10-CM

## 2021-10-08 DIAGNOSIS — Z13.220 ENCOUNTER FOR SCREENING FOR LIPID DISORDER: ICD-10-CM

## 2021-10-08 LAB
ALBUMIN SERPL-MCNC: 3.8 G/DL (ref 3.5–5.2)
ALP BLD-CCNC: 90 U/L (ref 40–130)
ALT SERPL-CCNC: 35 U/L (ref 5–41)
ANION GAP SERPL CALCULATED.3IONS-SCNC: 11 MMOL/L (ref 7–19)
AST SERPL-CCNC: 24 U/L (ref 5–40)
BASOPHILS ABSOLUTE: 0.1 K/UL (ref 0–0.2)
BASOPHILS RELATIVE PERCENT: 0.7 % (ref 0–1)
BILIRUB SERPL-MCNC: 0.4 MG/DL (ref 0.2–1.2)
BUN BLDV-MCNC: 18 MG/DL (ref 8–23)
CALCIUM SERPL-MCNC: 9.1 MG/DL (ref 8.8–10.2)
CHLORIDE BLD-SCNC: 101 MMOL/L (ref 98–111)
CHOLESTEROL, TOTAL: 107 MG/DL (ref 160–199)
CO2: 25 MMOL/L (ref 22–29)
CREAT SERPL-MCNC: 0.8 MG/DL (ref 0.5–1.2)
EOSINOPHILS ABSOLUTE: 0.2 K/UL (ref 0–0.6)
EOSINOPHILS RELATIVE PERCENT: 2.9 % (ref 0–5)
GFR AFRICAN AMERICAN: >59
GFR NON-AFRICAN AMERICAN: >60
GLUCOSE BLD-MCNC: 94 MG/DL (ref 74–109)
HCT VFR BLD CALC: 40 % (ref 42–52)
HDLC SERPL-MCNC: 10 MG/DL (ref 55–121)
HEMOGLOBIN: 12.5 G/DL (ref 14–18)
IMMATURE GRANULOCYTES #: 0 K/UL
LDL CHOLESTEROL CALCULATED: 72 MG/DL
LYMPHOCYTES ABSOLUTE: 1 K/UL (ref 1.1–4.5)
LYMPHOCYTES RELATIVE PERCENT: 12.6 % (ref 20–40)
MCH RBC QN AUTO: 30 PG (ref 27–31)
MCHC RBC AUTO-ENTMCNC: 31.3 G/DL (ref 33–37)
MCV RBC AUTO: 96.2 FL (ref 80–94)
MONOCYTES ABSOLUTE: 1 K/UL (ref 0–0.9)
MONOCYTES RELATIVE PERCENT: 12.5 % (ref 0–10)
NEUTROPHILS ABSOLUTE: 5.4 K/UL (ref 1.5–7.5)
NEUTROPHILS RELATIVE PERCENT: 71.2 % (ref 50–65)
PDW BLD-RTO: 14.5 % (ref 11.5–14.5)
PLATELET # BLD: 227 K/UL (ref 130–400)
PMV BLD AUTO: 10.3 FL (ref 9.4–12.4)
POTASSIUM SERPL-SCNC: 4.7 MMOL/L (ref 3.5–5)
PROSTATE SPECIFIC ANTIGEN: 0.93 NG/ML (ref 0–4)
RBC # BLD: 4.16 M/UL (ref 4.7–6.1)
SODIUM BLD-SCNC: 137 MMOL/L (ref 136–145)
TOTAL PROTEIN: 8 G/DL (ref 6.6–8.7)
TRIGL SERPL-MCNC: 126 MG/DL (ref 0–149)
WBC # BLD: 7.6 K/UL (ref 4.8–10.8)

## 2021-10-08 PROCEDURE — 1123F ACP DISCUSS/DSCN MKR DOCD: CPT | Performed by: NURSE PRACTITIONER

## 2021-10-08 PROCEDURE — G8484 FLU IMMUNIZE NO ADMIN: HCPCS | Performed by: NURSE PRACTITIONER

## 2021-10-08 PROCEDURE — 4040F PNEUMOC VAC/ADMIN/RCVD: CPT | Performed by: NURSE PRACTITIONER

## 2021-10-08 PROCEDURE — G0439 PPPS, SUBSEQ VISIT: HCPCS | Performed by: NURSE PRACTITIONER

## 2021-10-08 PROCEDURE — 3017F COLORECTAL CA SCREEN DOC REV: CPT | Performed by: NURSE PRACTITIONER

## 2021-10-08 RX ORDER — VIT A/VIT C/VIT E/ZINC/COPPER 2148-113
TABLET ORAL
Status: ON HOLD | COMMUNITY
End: 2022-05-18 | Stop reason: HOSPADM

## 2021-10-08 ASSESSMENT — PATIENT HEALTH QUESTIONNAIRE - PHQ9
SUM OF ALL RESPONSES TO PHQ QUESTIONS 1-9: 0
SUM OF ALL RESPONSES TO PHQ9 QUESTIONS 1 & 2: 0
1. LITTLE INTEREST OR PLEASURE IN DOING THINGS: 0
SUM OF ALL RESPONSES TO PHQ QUESTIONS 1-9: 0
2. FEELING DOWN, DEPRESSED OR HOPELESS: 0
SUM OF ALL RESPONSES TO PHQ QUESTIONS 1-9: 0

## 2021-10-08 ASSESSMENT — LIFESTYLE VARIABLES: HOW OFTEN DO YOU HAVE A DRINK CONTAINING ALCOHOL: 0

## 2021-10-08 NOTE — PATIENT INSTRUCTIONS
Monitor your blood pressure every a.m And once random during the day. Record them. The goal is top number under 140 and bottom number under 80. Personalized Preventive Plan for Makayla Meadows - 10/8/2021  Medicare offers a range of preventive health benefits. Some of the tests and screenings are paid in full while other may be subject to a deductible, co-insurance, and/or copay. Some of these benefits include a comprehensive review of your medical history including lifestyle, illnesses that may run in your family, and various assessments and screenings as appropriate. After reviewing your medical record and screening and assessments performed today your provider may have ordered immunizations, labs, imaging, and/or referrals for you. A list of these orders (if applicable) as well as your Preventive Care list are included within your After Visit Summary for your review. Other Preventive Recommendations:    · A preventive eye exam performed by an eye specialist is recommended every 1-2 years to screen for glaucoma; cataracts, macular degeneration, and other eye disorders. · A preventive dental visit is recommended every 6 months. · Try to get at least 150 minutes of exercise per week or 10,000 steps per day on a pedometer . · Order or download the FREE \"Exercise & Physical Activity: Your Everyday Guide\" from The [employer]. Call 1-575.708.6715 or search The [employer] online. · You need 0917-3804 mg of calcium and 0063-9744 IU of vitamin D per day. It is possible to meet your calcium requirement with diet alone, but a vitamin D supplement is usually necessary to meet this goal.  · When exposed to the sun, use a sunscreen that protects against both UVA and UVB radiation with an SPF of 30 or greater. Reapply every 2 to 3 hours or after sweating, drying off with a towel, or swimming. · Always wear a seat belt when traveling in a car.  Always wear a helmet when riding a bicycle or motorcycle.

## 2021-11-10 ENCOUNTER — OFFICE VISIT (OUTPATIENT)
Dept: PRIMARY CARE CLINIC | Age: 71
End: 2021-11-10
Payer: MEDICARE

## 2021-11-10 VITALS
BODY MASS INDEX: 27.94 KG/M2 | DIASTOLIC BLOOD PRESSURE: 72 MMHG | TEMPERATURE: 98.2 F | SYSTOLIC BLOOD PRESSURE: 138 MMHG | RESPIRATION RATE: 18 BRPM | HEART RATE: 107 BPM | HEIGHT: 67 IN | OXYGEN SATURATION: 100 % | WEIGHT: 178 LBS

## 2021-11-10 DIAGNOSIS — R05.9 COUGH: Primary | ICD-10-CM

## 2021-11-10 DIAGNOSIS — R50.9 FEVER, UNSPECIFIED FEVER CAUSE: ICD-10-CM

## 2021-11-10 LAB
ALBUMIN SERPL-MCNC: 3.5 G/DL (ref 3.5–5.2)
ALP BLD-CCNC: 82 U/L (ref 40–130)
ALT SERPL-CCNC: 56 U/L (ref 5–41)
ANION GAP SERPL CALCULATED.3IONS-SCNC: 14 MMOL/L (ref 7–19)
AST SERPL-CCNC: 35 U/L (ref 5–40)
BASOPHILS ABSOLUTE: 0.1 K/UL (ref 0–0.2)
BASOPHILS RELATIVE PERCENT: 0.7 % (ref 0–1)
BILIRUB SERPL-MCNC: 0.4 MG/DL (ref 0.2–1.2)
BUN BLDV-MCNC: 18 MG/DL (ref 8–23)
CALCIUM SERPL-MCNC: 9.1 MG/DL (ref 8.8–10.2)
CHLORIDE BLD-SCNC: 100 MMOL/L (ref 98–111)
CO2: 24 MMOL/L (ref 22–29)
CREAT SERPL-MCNC: 0.8 MG/DL (ref 0.5–1.2)
EOSINOPHILS ABSOLUTE: 0.2 K/UL (ref 0–0.6)
EOSINOPHILS RELATIVE PERCENT: 2 % (ref 0–5)
GFR AFRICAN AMERICAN: >59
GFR NON-AFRICAN AMERICAN: >60
GLUCOSE BLD-MCNC: 118 MG/DL (ref 74–109)
HCT VFR BLD CALC: 35.9 % (ref 42–52)
HEMOGLOBIN: 11.2 G/DL (ref 14–18)
IMMATURE GRANULOCYTES #: 0 K/UL
LYMPHOCYTES ABSOLUTE: 0.8 K/UL (ref 1.1–4.5)
LYMPHOCYTES RELATIVE PERCENT: 11 % (ref 20–40)
MCH RBC QN AUTO: 29.2 PG (ref 27–31)
MCHC RBC AUTO-ENTMCNC: 31.2 G/DL (ref 33–37)
MCV RBC AUTO: 93.5 FL (ref 80–94)
MONOCYTES ABSOLUTE: 0.9 K/UL (ref 0–0.9)
MONOCYTES RELATIVE PERCENT: 11.1 % (ref 0–10)
NEUTROPHILS ABSOLUTE: 5.7 K/UL (ref 1.5–7.5)
NEUTROPHILS RELATIVE PERCENT: 74.8 % (ref 50–65)
PDW BLD-RTO: 15.1 % (ref 11.5–14.5)
PLATELET # BLD: 241 K/UL (ref 130–400)
PMV BLD AUTO: 10.3 FL (ref 9.4–12.4)
POTASSIUM SERPL-SCNC: 4.6 MMOL/L (ref 3.5–5)
RBC # BLD: 3.84 M/UL (ref 4.7–6.1)
SARS-COV-2, PCR: NOT DETECTED
SODIUM BLD-SCNC: 138 MMOL/L (ref 136–145)
TOTAL PROTEIN: 8 G/DL (ref 6.6–8.7)
WBC # BLD: 7.7 K/UL (ref 4.8–10.8)

## 2021-11-10 PROCEDURE — 1123F ACP DISCUSS/DSCN MKR DOCD: CPT | Performed by: NURSE PRACTITIONER

## 2021-11-10 PROCEDURE — 1036F TOBACCO NON-USER: CPT | Performed by: NURSE PRACTITIONER

## 2021-11-10 PROCEDURE — G8427 DOCREV CUR MEDS BY ELIG CLIN: HCPCS | Performed by: NURSE PRACTITIONER

## 2021-11-10 PROCEDURE — G8417 CALC BMI ABV UP PARAM F/U: HCPCS | Performed by: NURSE PRACTITIONER

## 2021-11-10 PROCEDURE — 3017F COLORECTAL CA SCREEN DOC REV: CPT | Performed by: NURSE PRACTITIONER

## 2021-11-10 PROCEDURE — 99213 OFFICE O/P EST LOW 20 MIN: CPT | Performed by: NURSE PRACTITIONER

## 2021-11-10 PROCEDURE — 4040F PNEUMOC VAC/ADMIN/RCVD: CPT | Performed by: NURSE PRACTITIONER

## 2021-11-10 PROCEDURE — G8484 FLU IMMUNIZE NO ADMIN: HCPCS | Performed by: NURSE PRACTITIONER

## 2021-11-10 ASSESSMENT — ENCOUNTER SYMPTOMS
EYES NEGATIVE: 1
ALLERGIC/IMMUNOLOGIC NEGATIVE: 1
GASTROINTESTINAL NEGATIVE: 1
RESPIRATORY NEGATIVE: 1

## 2021-11-10 NOTE — PATIENT INSTRUCTIONS
Rest at home till labs back   Drink lots of clear liquids  Treat any fever over 100 or if body aches  May treat cough with meds and inhaler after labs back

## 2021-11-10 NOTE — PROGRESS NOTES
[facility] PHYSICIAN SERVICES  [facility]  [address]  [address] [address]  Dept: [phone]  Dept Fax: [phone]  Loc: [phone]    Devang Murphy is a 70 y.o. male who presents today for his medical conditions/complaints as noted below. Devang Murphy is c/o of Cough (Pt is here for a cough. Pt states this has been going on for 3 weeks. ), Fever (Pt states he gets a fever at around sunset. ), Back Pain, Neck Pain, and Fatigue        HPI:     HPI   Chief Complaint   Patient presents with    Cough     Pt is here for a cough. Pt states this has been going on for 3 weeks.  Fever     Pt states he gets a fever at around sunset.  Back Pain    Neck Pain    Fatigue   fever has been only last 4 days up to 101, worse in evening. Aspirin and ibuprofen do help symptoms. He has been recording his temperatures for the last 4 days and they have but been a 9 and 101. He has not been vaccinated. He has not had Covid. He does not think he has been exposed. Past Medical History:   Diagnosis Date    Chronic neck pain     HTN (hypertension)       History reviewed. No pertinent surgical history.     Vitals 11/10/2021 10/8/2021 10/8/2021 10/7/2020 10/4/2019 38/4/3914   SYSTOLIC [phone]   DIASTOLIC 72 80 90 84 82 80   Site - - - Left Upper Arm - -   Position - - - Sitting - -   Cuff Size - - - Medium Adult - -   Pulse 107 - 93 92 87 74   Temp 98.2 - 96.1 98.8 97.9 97.3   Resp 18 - - 16 16 16   SpO2 100 - 99 99 99 98   Weight 178 lb - 182 lb 192 lb 9.6 oz 189 lb 196 lb   Height 5' 7\" - 5' 7\" 5' 7\" 5' 7\" 5' 7\"   Body mass index 27.88 kg/m2 - 28.5 kg/m2 30.16 kg/m2 29.6 kg/m2 30.69 kg/m2   Pain Level - - - - - -   Some recent data might be hidden       Family History   Problem Relation Age of Onset    Cancer Mother         lymphoma    Heart Disease Father        Social History     Tobacco Use    Smoking status: Former Smoker     Packs/day: 2.00     Years: 15.00     Pack years: 30. 00     Types: Cigarettes     Quit date: 26     Years since quittin.8    Smokeless tobacco: Never Used   Substance Use Topics    Alcohol use: Yes     Comment: Occasional       Current Outpatient Medications on File Prior to Visit   Medication Sig Dispense Refill    KRILL OIL PO Take by mouth      Red Yeast Rice Extract (RED YEAST RICE PO) Take by mouth      Multiple Vitamins-Minerals (PRESERVISION AREDS) TABS Take by mouth      Probiotic Product (PROBIOTIC-10 PO) Take by mouth      Zn-Pyg Afri-Nettle-Saw Palmet (SAW PALMETTO COMPLEX PO) Take by mouth      amLODIPine (NORVASC) 5 MG tablet TAKE 1 TABLET NIGHTLY 90 tablet 3    benazepril (LOTENSIN) 20 MG tablet TAKE 1 TABLET EVERY DAY 90 tablet 3    Coenzyme Q10 (CO Q 10 PO) Take by mouth      Multiple Vitamins-Minerals (THERAPEUTIC MULTIVITAMIN-MINERALS) tablet Take 1 tablet by mouth daily      niacin 500 MG CR capsule Take 500 mg by mouth nightly       aspirin 81 MG tablet Take 81 mg by mouth daily      fluconazole (DIFLUCAN) 150 MG tablet Take one now and repeat in 1 wk if needed (Patient not taking: Reported on 11/10/2021) 2 tablet 0    Vitamin D (CHOLECALCIFEROL) 1000 UNITS CAPS capsule Take 1,000 Units by mouth daily (Patient not taking: Reported on 10/8/2021)      Glucosamine-Chondroit-Vit C-Mn (GLUCOSAMINE CHONDR 1500 COMPLX) CAPS Take 1 capsule by mouth 2 times daily (Patient not taking: Reported on 11/10/2021)       No current facility-administered medications on file prior to visit.      No Known Allergies    Health Maintenance   Topic Date Due    AAA screen  Never done    Hepatitis C screen  Never done    COVID-19 Vaccine (1) Never done    Shingles Vaccine (1 of 2) Never done    Pneumococcal 65+ years Vaccine (1 of 1 - PPSV23) Never done    Colon Cancer Screen FIT/FOBT  2021    Flu vaccine (1) 2021 (Originally 2021)    DTaP/Tdap/Td vaccine (1 - Tdap) 11/10/2022 (Originally 1969)    Potassium monitoring 10/08/2022    Creatinine monitoring  10/08/2022    Annual Wellness Visit (AWV)  10/09/2022    Lipid screen  10/08/2026    Hepatitis A vaccine  Aged Out    Hepatitis B vaccine  Aged Out    Hib vaccine  Aged Out    Meningococcal (ACWY) vaccine  Aged Out       Subjective:      Review of Systems   Constitutional: Negative. HENT: Negative. Eyes: Negative. Respiratory: Negative. Cardiovascular: Negative. Gastrointestinal: Negative. Endocrine: Negative. Genitourinary: Negative. Musculoskeletal: Negative. Allergic/Immunologic: Negative. Neurological: Negative. Hematological: Negative. Psychiatric/Behavioral: Negative. Objective:     Physical Exam  Vitals and nursing note reviewed. Constitutional:       Appearance: Normal appearance. He is well-developed. HENT:      Head: Normocephalic. Right Ear: Tympanic membrane and external ear normal.      Left Ear: Tympanic membrane and external ear normal.      Nose: Nose normal.   Cardiovascular:      Rate and Rhythm: Normal rate and regular rhythm. Pulses: Normal pulses. Heart sounds: Normal heart sounds. Pulmonary:      Effort: Pulmonary effort is normal.      Breath sounds: Normal breath sounds. Skin:     General: Skin is warm and dry. Capillary Refill: Capillary refill takes less than 2 seconds. Neurological:      General: No focal deficit present. Mental Status: He is alert and oriented to person, place, and time. Mental status is at baseline. Psychiatric:         Mood and Affect: Mood normal.         Behavior: Behavior normal.         Thought Content: Thought content normal.         Judgment: Judgment normal.       /72   Pulse 107   Temp 98.2 °F (36.8 °C)   Resp 18   Ht 5' 7\" (1.702 m)   Wt 178 lb (80.7 kg)   SpO2 100%   BMI 27.88 kg/m²     Assessment:       Diagnosis Orders   1. Cough  CBC Auto Differential    Comprehensive Metabolic Panel   2.  Fever, unspecified fever cause CBC Auto Differential    Comprehensive Metabolic Panel         Plan:   More than 50% of the time was spent counseling and coordinating care for a total time of 25min face to face. PDMP Monitoring:    Last PDMP Bart as Reviewed [facility]):  Review User Review Instant Review Result            Urine Drug Screenings (1 yr)    No resulted procedures found. Medication Contract and Consent for Opioid Use Documents Filed      No documents found                 Patient given educational materials -see patient instructions. Discussed use, benefit, and side effects of prescribed medications. All patient questions answered. Pt voiced understanding. Reviewed health maintenance. Instructed to continue currentmedications, diet and exercise. Patient agreed with treatment plan. Follow up as directed. MEDICATIONS:  No orders of the defined types were placed in this encounter. ORDERS:  Orders Placed This Encounter   Procedures    CBC Auto Differential    Comprehensive Metabolic Panel    [id number]       Follow-up:  Return if symptoms worsen or fail to improve. PATIENT INSTRUCTIONS:  Patient Instructions   Rest at home till labs back   Drink lots of clear liquids  Treat any fever over 100 or if body aches  May treat cough with meds and inhaler after labs back        Electronically signed by MICHELA Stevenson CNP on 11/10/2021 at 1:38 PM    EMR Dragon/transcription disclaimer:  Much of thisencounter note is electronic transcription/translation of spoken language to printed texts. The electronic translation of spoken language may be erroneous, or at times, nonsensical words or phrases may be inadvertentlytranscribed.   Although I have reviewed the note for such errors, some may still exist.

## 2021-11-11 DIAGNOSIS — R74.8 ABNORMAL LIVER ENZYMES: ICD-10-CM

## 2021-11-11 DIAGNOSIS — D64.9 ANEMIA, UNSPECIFIED TYPE: Primary | ICD-10-CM

## 2021-11-24 ENCOUNTER — NURSE ONLY (OUTPATIENT)
Dept: PRIMARY CARE CLINIC | Age: 71
End: 2021-11-24

## 2021-11-24 DIAGNOSIS — D64.9 ANEMIA, UNSPECIFIED TYPE: ICD-10-CM

## 2021-11-24 DIAGNOSIS — R74.8 ABNORMAL LIVER ENZYMES: ICD-10-CM

## 2021-11-24 LAB
ALBUMIN SERPL-MCNC: 3.6 G/DL (ref 3.5–5.2)
ALP BLD-CCNC: 82 U/L (ref 40–130)
ALT SERPL-CCNC: 55 U/L (ref 5–41)
ANION GAP SERPL CALCULATED.3IONS-SCNC: 12 MMOL/L (ref 7–19)
AST SERPL-CCNC: 32 U/L (ref 5–40)
BASOPHILS ABSOLUTE: 0.1 K/UL (ref 0–0.2)
BASOPHILS RELATIVE PERCENT: 0.9 % (ref 0–1)
BILIRUB SERPL-MCNC: 0.4 MG/DL (ref 0.2–1.2)
BUN BLDV-MCNC: 16 MG/DL (ref 8–23)
CALCIUM SERPL-MCNC: 9.3 MG/DL (ref 8.8–10.2)
CHLORIDE BLD-SCNC: 104 MMOL/L (ref 98–111)
CO2: 24 MMOL/L (ref 22–29)
CREAT SERPL-MCNC: 0.7 MG/DL (ref 0.5–1.2)
EOSINOPHILS ABSOLUTE: 0.1 K/UL (ref 0–0.6)
EOSINOPHILS RELATIVE PERCENT: 1.7 % (ref 0–5)
GFR AFRICAN AMERICAN: >59
GFR NON-AFRICAN AMERICAN: >60
GLUCOSE BLD-MCNC: 118 MG/DL (ref 74–109)
HCT VFR BLD CALC: 36.2 % (ref 42–52)
HEMOGLOBIN: 10.9 G/DL (ref 14–18)
IMMATURE GRANULOCYTES #: 0 K/UL
LYMPHOCYTES ABSOLUTE: 0.9 K/UL (ref 1.1–4.5)
LYMPHOCYTES RELATIVE PERCENT: 14.1 % (ref 20–40)
MCH RBC QN AUTO: 28.2 PG (ref 27–31)
MCHC RBC AUTO-ENTMCNC: 30.1 G/DL (ref 33–37)
MCV RBC AUTO: 93.8 FL (ref 80–94)
MONOCYTES ABSOLUTE: 0.7 K/UL (ref 0–0.9)
MONOCYTES RELATIVE PERCENT: 10.7 % (ref 0–10)
NEUTROPHILS ABSOLUTE: 4.7 K/UL (ref 1.5–7.5)
NEUTROPHILS RELATIVE PERCENT: 72.3 % (ref 50–65)
PDW BLD-RTO: 15.4 % (ref 11.5–14.5)
PLATELET # BLD: 282 K/UL (ref 130–400)
PMV BLD AUTO: 10.3 FL (ref 9.4–12.4)
POTASSIUM SERPL-SCNC: 4.1 MMOL/L (ref 3.5–5)
RBC # BLD: 3.86 M/UL (ref 4.7–6.1)
SODIUM BLD-SCNC: 140 MMOL/L (ref 136–145)
TOTAL PROTEIN: 8.3 G/DL (ref 6.6–8.7)
WBC # BLD: 6.5 K/UL (ref 4.8–10.8)

## 2021-12-26 ENCOUNTER — OFFICE VISIT (OUTPATIENT)
Dept: URGENT CARE | Age: 71
End: 2021-12-26
Payer: MEDICARE

## 2021-12-26 VITALS
SYSTOLIC BLOOD PRESSURE: 163 MMHG | OXYGEN SATURATION: 98 % | DIASTOLIC BLOOD PRESSURE: 67 MMHG | BODY MASS INDEX: 26.94 KG/M2 | HEART RATE: 100 BPM | TEMPERATURE: 100.7 F | RESPIRATION RATE: 18 BRPM | WEIGHT: 172 LBS

## 2021-12-26 DIAGNOSIS — Z11.59 SCREENING FOR VIRAL DISEASE: Primary | ICD-10-CM

## 2021-12-26 LAB — SARS-COV-2, PCR: DETECTED

## 2021-12-26 PROCEDURE — 1123F ACP DISCUSS/DSCN MKR DOCD: CPT | Performed by: NURSE PRACTITIONER

## 2021-12-26 PROCEDURE — 99212 OFFICE O/P EST SF 10 MIN: CPT | Performed by: NURSE PRACTITIONER

## 2021-12-26 PROCEDURE — G8427 DOCREV CUR MEDS BY ELIG CLIN: HCPCS | Performed by: NURSE PRACTITIONER

## 2021-12-26 PROCEDURE — 1036F TOBACCO NON-USER: CPT | Performed by: NURSE PRACTITIONER

## 2021-12-26 PROCEDURE — 3017F COLORECTAL CA SCREEN DOC REV: CPT | Performed by: NURSE PRACTITIONER

## 2021-12-26 PROCEDURE — 4040F PNEUMOC VAC/ADMIN/RCVD: CPT | Performed by: NURSE PRACTITIONER

## 2021-12-26 PROCEDURE — G8417 CALC BMI ABV UP PARAM F/U: HCPCS | Performed by: NURSE PRACTITIONER

## 2021-12-26 PROCEDURE — G8484 FLU IMMUNIZE NO ADMIN: HCPCS | Performed by: NURSE PRACTITIONER

## 2021-12-26 ASSESSMENT — PATIENT HEALTH QUESTIONNAIRE - PHQ9
SUM OF ALL RESPONSES TO PHQ QUESTIONS 1-9: 0
SUM OF ALL RESPONSES TO PHQ QUESTIONS 1-9: 0
2. FEELING DOWN, DEPRESSED OR HOPELESS: 0
DEPRESSION UNABLE TO ASSESS: FUNCTIONAL CAPACITY MOTIVATION LIMITS ACCURACY
SUM OF ALL RESPONSES TO PHQ9 QUESTIONS 1 & 2: 0
SUM OF ALL RESPONSES TO PHQ QUESTIONS 1-9: 0
1. LITTLE INTEREST OR PLEASURE IN DOING THINGS: 0

## 2021-12-26 NOTE — PROGRESS NOTES
Braeden Watson presents to the clinic today requesting COVID-19 testing. He complains of cough and fever. He has not had positive exposure. On exam, patient appears in no acute distress. Speech is clear. Breathing is unlabored. Moves all extremities and is ambulatory. We will order a COVID test today to be performed in clinic. The patient and appropriate authorities will be informed of the results. He should quarantine at home until results are returned. If he tests positive, he should quarantine for a total of 10 days after symptoms began and 24 hours after fever resolves without fever reducing medications per CDC guidelines. Patient was advised that even if asymptomatic, after a positive result he should quarantine for 10 days per [facility] guidelines. Patient left in stable condition. Total of 20 minutes spent, which includes face to face with patient, record review, evaluation, planning, and education as well as coordination of his care.

## 2021-12-27 ENCOUNTER — HOSPITAL ENCOUNTER (OUTPATIENT)
Dept: INFUSION THERAPY | Age: 71
Setting detail: INFUSION SERIES
Discharge: HOME | End: 2021-12-27
Payer: MEDICARE

## 2021-12-27 ENCOUNTER — TELEPHONE (OUTPATIENT)
Dept: PRIMARY CARE CLINIC | Age: 71
End: 2021-12-27

## 2021-12-27 VITALS
RESPIRATION RATE: 18 BRPM | OXYGEN SATURATION: 95 % | HEART RATE: 95 BPM | SYSTOLIC BLOOD PRESSURE: 123 MMHG | TEMPERATURE: 100.2 F | DIASTOLIC BLOOD PRESSURE: 70 MMHG

## 2021-12-27 DIAGNOSIS — U07.1 COVID-19: ICD-10-CM

## 2021-12-27 DIAGNOSIS — U07.1 COVID-19: Primary | ICD-10-CM

## 2021-12-27 PROCEDURE — 6370000000 HC RX 637 (ALT 250 FOR IP): Performed by: NURSE PRACTITIONER

## 2021-12-27 PROCEDURE — 96374 THER/PROPH/DIAG INJ IV PUSH: CPT

## 2021-12-27 PROCEDURE — M0243 CASIRIVI AND IMDEVI INFUSION: HCPCS

## 2021-12-27 PROCEDURE — 6360000002 HC RX W HCPCS: Performed by: NURSE PRACTITIONER

## 2021-12-27 PROCEDURE — 2580000003 HC RX 258: Performed by: NURSE PRACTITIONER

## 2021-12-27 RX ORDER — EPINEPHRINE 1 MG/ML
0.3 INJECTION, SOLUTION, CONCENTRATE INTRAVENOUS PRN
Status: CANCELLED | OUTPATIENT
Start: 2021-12-27

## 2021-12-27 RX ORDER — DIPHENHYDRAMINE HYDROCHLORIDE 50 MG/ML
25 INJECTION INTRAMUSCULAR; INTRAVENOUS ONCE
Status: COMPLETED | OUTPATIENT
Start: 2021-12-27 | End: 2021-12-27

## 2021-12-27 RX ORDER — SODIUM CHLORIDE 9 MG/ML
INJECTION, SOLUTION INTRAVENOUS CONTINUOUS
Status: CANCELLED | OUTPATIENT
Start: 2021-12-27

## 2021-12-27 RX ORDER — ONDANSETRON 2 MG/ML
8 INJECTION INTRAMUSCULAR; INTRAVENOUS
Status: CANCELLED | OUTPATIENT
Start: 2021-12-27

## 2021-12-27 RX ORDER — SODIUM CHLORIDE 0.9 % (FLUSH) 0.9 %
5-40 SYRINGE (ML) INJECTION PRN
Status: CANCELLED | OUTPATIENT
Start: 2021-12-27

## 2021-12-27 RX ORDER — SODIUM CHLORIDE 9 MG/ML
INJECTION, SOLUTION INTRAVENOUS CONTINUOUS
Status: ACTIVE | OUTPATIENT
Start: 2021-12-27 | End: 2021-12-27

## 2021-12-27 RX ORDER — ACETAMINOPHEN 325 MG/1
650 TABLET ORAL ONCE
Status: CANCELLED
Start: 2021-12-27 | End: 2021-12-27

## 2021-12-27 RX ORDER — ACETAMINOPHEN 325 MG/1
650 TABLET ORAL ONCE
Status: COMPLETED | OUTPATIENT
Start: 2021-12-27 | End: 2021-12-27

## 2021-12-27 RX ORDER — SODIUM CHLORIDE 0.9 % (FLUSH) 0.9 %
5-40 SYRINGE (ML) INJECTION PRN
Status: DISCONTINUED | OUTPATIENT
Start: 2021-12-27 | End: 2021-12-28 | Stop reason: HOSPADM

## 2021-12-27 RX ORDER — ALBUTEROL SULFATE 90 UG/1
4 AEROSOL, METERED RESPIRATORY (INHALATION) PRN
Status: CANCELLED | OUTPATIENT
Start: 2021-12-27

## 2021-12-27 RX ORDER — DIPHENHYDRAMINE HYDROCHLORIDE 50 MG/ML
50 INJECTION INTRAMUSCULAR; INTRAVENOUS
Status: CANCELLED | OUTPATIENT
Start: 2021-12-27

## 2021-12-27 RX ORDER — ACETAMINOPHEN 325 MG/1
650 TABLET ORAL
Status: CANCELLED | OUTPATIENT
Start: 2021-12-27

## 2021-12-27 RX ORDER — DIPHENHYDRAMINE HYDROCHLORIDE 50 MG/ML
25 INJECTION INTRAMUSCULAR; INTRAVENOUS ONCE
Status: CANCELLED
Start: 2021-12-27 | End: 2021-12-27

## 2021-12-27 RX ADMIN — DIPHENHYDRAMINE HYDROCHLORIDE 25 MG: 50 INJECTION, SOLUTION INTRAMUSCULAR; INTRAVENOUS at 13:51

## 2021-12-27 RX ADMIN — SODIUM CHLORIDE: 9 INJECTION, SOLUTION INTRAVENOUS at 13:51

## 2021-12-27 RX ADMIN — ACETAMINOPHEN 650 MG: 325 TABLET ORAL at 13:51

## 2021-12-27 RX ADMIN — CASIRIVIMAB AND IMDEVIMAB: 600; 600 INJECTION, SOLUTION, CONCENTRATE INTRAVENOUS at 13:52

## 2021-12-27 ASSESSMENT — PAIN SCALES - GENERAL: PAINLEVEL_OUTOF10: 0

## 2022-01-11 ENCOUNTER — NURSE ONLY (OUTPATIENT)
Dept: PRIMARY CARE CLINIC | Age: 72
End: 2022-01-11

## 2022-01-11 DIAGNOSIS — R71.0 DECREASED HEMOGLOBIN: Primary | ICD-10-CM

## 2022-01-11 LAB
HCT VFR BLD CALC: 40.8 % (ref 42–52)
HEMOGLOBIN: 12.4 G/DL (ref 14–18)
MCH RBC QN AUTO: 28.5 PG (ref 27–31)
MCHC RBC AUTO-ENTMCNC: 30.4 G/DL (ref 33–37)
MCV RBC AUTO: 93.8 FL (ref 80–94)
PDW BLD-RTO: 16.8 % (ref 11.5–14.5)
PLATELET # BLD: 276 K/UL (ref 130–400)
PMV BLD AUTO: 10.3 FL (ref 9.4–12.4)
RBC # BLD: 4.35 M/UL (ref 4.7–6.1)
WBC # BLD: 7.1 K/UL (ref 4.8–10.8)

## 2022-02-18 ENCOUNTER — PATIENT MESSAGE (OUTPATIENT)
Dept: PRIMARY CARE CLINIC | Age: 72
End: 2022-02-18

## 2022-02-18 RX ORDER — METHYLPREDNISOLONE 4 MG/1
TABLET ORAL
Qty: 1 KIT | Refills: 0 | Status: SHIPPED | OUTPATIENT
Start: 2022-02-18 | End: 2022-02-24

## 2022-02-18 NOTE — TELEPHONE ENCOUNTER
From: Nikki Osborne  To: Romijustin Curry  Sent: 2/18/2022 8:39 AM CST  Subject: Aches and Pains    After the infusion everything just went away, now they are coming back my dull headache and the base of my spine on the right side. I also have a little discomfort when I urinate that one is new. It's not painful but normally you don't feel it. I believe the infusion took out all the inflammation. Is there an anti-inflammatory that I can get? I'm trying to stay away from aspirin and other nsaids. I know I'll probably be on pain and inflammation meds the rest of my life.

## 2022-02-24 ENCOUNTER — OFFICE VISIT (OUTPATIENT)
Dept: PRIMARY CARE CLINIC | Age: 72
End: 2022-02-24
Payer: MEDICARE

## 2022-02-24 VITALS
OXYGEN SATURATION: 99 % | RESPIRATION RATE: 16 BRPM | HEIGHT: 67 IN | BODY MASS INDEX: 26.53 KG/M2 | DIASTOLIC BLOOD PRESSURE: 80 MMHG | TEMPERATURE: 98.8 F | WEIGHT: 169 LBS | SYSTOLIC BLOOD PRESSURE: 150 MMHG | HEART RATE: 116 BPM

## 2022-02-24 DIAGNOSIS — R06.02 SHORT OF BREATH ON EXERTION: ICD-10-CM

## 2022-02-24 DIAGNOSIS — R30.0 DYSURIA: ICD-10-CM

## 2022-02-24 DIAGNOSIS — R05.3 POST-COVID CHRONIC COUGH: Primary | ICD-10-CM

## 2022-02-24 DIAGNOSIS — R53.83 FATIGUE, UNSPECIFIED TYPE: ICD-10-CM

## 2022-02-24 DIAGNOSIS — U09.9 POST-COVID CHRONIC COUGH: Primary | ICD-10-CM

## 2022-02-24 DIAGNOSIS — R63.4 WEIGHT LOSS: ICD-10-CM

## 2022-02-24 LAB
ALBUMIN SERPL-MCNC: 3.5 G/DL (ref 3.5–5.2)
ALP BLD-CCNC: 86 U/L (ref 40–130)
ALT SERPL-CCNC: 57 U/L (ref 5–41)
ANION GAP SERPL CALCULATED.3IONS-SCNC: 13 MMOL/L (ref 7–19)
APPEARANCE FLUID: CLEAR
AST SERPL-CCNC: 40 U/L (ref 5–40)
BASOPHILS ABSOLUTE: 0.1 K/UL (ref 0–0.2)
BASOPHILS RELATIVE PERCENT: 0.5 % (ref 0–1)
BILIRUB SERPL-MCNC: 0.3 MG/DL (ref 0.2–1.2)
BILIRUBIN, POC: NORMAL
BLOOD URINE, POC: NORMAL
BUN BLDV-MCNC: 23 MG/DL (ref 8–23)
CALCIUM SERPL-MCNC: 8.9 MG/DL (ref 8.8–10.2)
CHLORIDE BLD-SCNC: 96 MMOL/L (ref 98–111)
CLARITY, POC: CLEAR
CO2: 25 MMOL/L (ref 22–29)
COLOR, POC: YELLOW
CREAT SERPL-MCNC: 0.8 MG/DL (ref 0.5–1.2)
D DIMER: 0.49 UG/ML FEU (ref 0–0.48)
EOSINOPHILS ABSOLUTE: 0.1 K/UL (ref 0–0.6)
EOSINOPHILS RELATIVE PERCENT: 0.6 % (ref 0–5)
GFR AFRICAN AMERICAN: >59
GFR NON-AFRICAN AMERICAN: >60
GLUCOSE BLD-MCNC: 114 MG/DL (ref 74–109)
GLUCOSE URINE, POC: NORMAL
HCT VFR BLD CALC: 39.7 % (ref 42–52)
HEMOGLOBIN: 12.7 G/DL (ref 14–18)
IMMATURE GRANULOCYTES #: 0 K/UL
KETONES, POC: NORMAL
LEUKOCYTE EST, POC: NORMAL
LYMPHOCYTES ABSOLUTE: 1.4 K/UL (ref 1.1–4.5)
LYMPHOCYTES RELATIVE PERCENT: 12.4 % (ref 20–40)
MCH RBC QN AUTO: 29.1 PG (ref 27–31)
MCHC RBC AUTO-ENTMCNC: 32 G/DL (ref 33–37)
MCV RBC AUTO: 90.8 FL (ref 80–94)
MONOCYTES ABSOLUTE: 1.5 K/UL (ref 0–0.9)
MONOCYTES RELATIVE PERCENT: 13.2 % (ref 0–10)
NEUTROPHILS ABSOLUTE: 8.1 K/UL (ref 1.5–7.5)
NEUTROPHILS RELATIVE PERCENT: 72.9 % (ref 50–65)
NITRITE, POC: NORMAL
PDW BLD-RTO: 14.2 % (ref 11.5–14.5)
PH, POC: 5
PLATELET # BLD: 296 K/UL (ref 130–400)
PMV BLD AUTO: 9.9 FL (ref 9.4–12.4)
POTASSIUM SERPL-SCNC: 4.5 MMOL/L (ref 3.5–5)
PROTEIN, POC: NORMAL
RBC # BLD: 4.37 M/UL (ref 4.7–6.1)
SODIUM BLD-SCNC: 134 MMOL/L (ref 136–145)
SPECIFIC GRAVITY, POC: 1010
TOTAL PROTEIN: 8.1 G/DL (ref 6.6–8.7)
TSH SERPL DL<=0.05 MIU/L-ACNC: 1.14 UIU/ML (ref 0.27–4.2)
UROBILINOGEN, POC: 0.2
WBC # BLD: 11.1 K/UL (ref 4.8–10.8)

## 2022-02-24 PROCEDURE — 4040F PNEUMOC VAC/ADMIN/RCVD: CPT | Performed by: NURSE PRACTITIONER

## 2022-02-24 PROCEDURE — 81002 URINALYSIS NONAUTO W/O SCOPE: CPT | Performed by: NURSE PRACTITIONER

## 2022-02-24 PROCEDURE — G8427 DOCREV CUR MEDS BY ELIG CLIN: HCPCS | Performed by: NURSE PRACTITIONER

## 2022-02-24 PROCEDURE — 99214 OFFICE O/P EST MOD 30 MIN: CPT | Performed by: NURSE PRACTITIONER

## 2022-02-24 PROCEDURE — 1036F TOBACCO NON-USER: CPT | Performed by: NURSE PRACTITIONER

## 2022-02-24 PROCEDURE — 1123F ACP DISCUSS/DSCN MKR DOCD: CPT | Performed by: NURSE PRACTITIONER

## 2022-02-24 PROCEDURE — G8484 FLU IMMUNIZE NO ADMIN: HCPCS | Performed by: NURSE PRACTITIONER

## 2022-02-24 PROCEDURE — 3017F COLORECTAL CA SCREEN DOC REV: CPT | Performed by: NURSE PRACTITIONER

## 2022-02-24 PROCEDURE — G8417 CALC BMI ABV UP PARAM F/U: HCPCS | Performed by: NURSE PRACTITIONER

## 2022-02-24 RX ORDER — LEVOFLOXACIN 500 MG/1
500 TABLET, FILM COATED ORAL DAILY
Qty: 7 TABLET | Refills: 0 | Status: SHIPPED | OUTPATIENT
Start: 2022-02-24 | End: 2022-03-03

## 2022-02-24 ASSESSMENT — ENCOUNTER SYMPTOMS
COUGH: 1
ALLERGIC/IMMUNOLOGIC NEGATIVE: 1
SHORTNESS OF BREATH: 1
EYES NEGATIVE: 1
GASTROINTESTINAL NEGATIVE: 1

## 2022-02-24 NOTE — PROGRESS NOTES
[facility] PHYSICIAN SERVICES  [facility]  [address]  [address] [address]  Dept: [phone]  Dept Fax: [phone]  Loc: [phone]    Mazin Blood is a 70 y.o. male who presents today for his medical conditions/complaints as noted below. Mazin Blood is c/o of Cough (patient presents today with cough. Patient states that he had covid in december and got the infusion 12/27/21. Patient denies fever. )        HPI:     HPI   Chief Complaint   Patient presents with    Cough     patient presents today with cough. Patient states that he had covid in december and got the infusion 12/27/21. Patient denies fever. He got the infusion while having Covid. And he said he initially felt much better. He did lose about 10 or 15 pounds while he had COVID. He has gained some of that back. He is having some problems urinating it hurts when he urinates and he has noticed a decrease in his stream.  He is also very fatigued. His wife had sent me a [employer] message about him saying that he was extremely fatigued sleeping long hours losing weight and she could tell a big difference in him. She is very concerned about him. Today his blood pressure is a little high and his heart rate is up. He is on the Medrol Dosepak which he says has helped some. He did take his blood pressure meds right before he came. He denies any blood in his stool. He denies blood in his urine. He denies nausea vomiting or diarrhea. He denies changes in bowel habits. Past Medical History:   Diagnosis Date    Chronic neck pain     HTN (hypertension)       History reviewed. No pertinent surgical history.     Vitals 2/24/2022 2/24/2022 12/27/2021 12/27/2021 12/27/2021 86/57/1158   SYSTOLIC 771 [phone]   DIASTOLIC 80 80 70 - 79 67   Site - - - - - -   Position - - - - - -   Cuff Size - - - - - -   Pulse - 116 95 - 105 100   Temp - 98.8 100.2 - 100.6 100.7   Resp - 16 18 - 16 18   SpO2 - 99 95 - 97 98   Weight - 169 lb - - - 172 lb   Height - 5' 7\" - - - -   Body mass index - 26.47 kg/m2 - - - -   Pain Level - - - 0 - -   Some recent data might be hidden       Family History   Problem Relation Age of Onset    Cancer Mother         lymphoma    Heart Disease Father        Social History     Tobacco Use    Smoking status: Former Smoker     Packs/day: 2.00     Years: 15.00     Pack years: 30.00     Types: Cigarettes     Quit date:      Years since quittin.1    Smokeless tobacco: Never Used   Substance Use Topics    Alcohol use: Yes     Comment: Occasional       Current Outpatient Medications on File Prior to Visit   Medication Sig Dispense Refill    KRILL OIL PO Take by mouth      Red Yeast Rice Extract (RED YEAST RICE PO) Take by mouth      Multiple Vitamins-Minerals (PRESERVISION AREDS) TABS Take by mouth      Probiotic Product (PROBIOTIC-10 PO) Take by mouth      Zn-Pyg Afri-Nettle-Saw Palmet (SAW PALMETTO COMPLEX PO) Take by mouth      amLODIPine (NORVASC) 5 MG tablet TAKE 1 TABLET NIGHTLY 90 tablet 3    benazepril (LOTENSIN) 20 MG tablet TAKE 1 TABLET EVERY DAY 90 tablet 3    Coenzyme Q10 (CO Q 10 PO) Take by mouth      Multiple Vitamins-Minerals (THERAPEUTIC MULTIVITAMIN-MINERALS) tablet Take 1 tablet by mouth daily      Vitamin D (CHOLECALCIFEROL) 1000 UNITS CAPS capsule Take 1,000 Units by mouth daily       niacin 500 MG CR capsule Take 500 mg by mouth nightly       Glucosamine-Chondroit-Vit C-Mn (GLUCOSAMINE CHONDR 1500 COMPLX) CAPS Take 1 capsule by mouth 2 times daily       aspirin 81 MG tablet Take 81 mg by mouth daily       No current facility-administered medications on file prior to visit.      No Known Allergies    Health Maintenance   Topic Date Due    Hepatitis C screen  Never done    COVID-19 Vaccine (1) Never done    Shingles Vaccine (1 of 2) Never done    Pneumococcal 65+ years Vaccine (1 of 1 - PPSV23) Never done    AAA screen  Never done    Colorectal Cancer Screen 08/20/2021    Flu vaccine (1) Never done    DTaP/Tdap/Td vaccine (1 - Tdap) 11/10/2022 (Originally 9/1/1969)    Annual Wellness Visit (AWV)  10/09/2022    Potassium monitoring  11/24/2022    Creatinine monitoring  11/24/2022    Depression Screen  12/26/2022    Lipid screen  10/08/2026    Hepatitis A vaccine  Aged Out    Hepatitis B vaccine  Aged Out    Hib vaccine  Aged Out    Meningococcal (ACWY) vaccine  Aged Out       Subjective:      Review of Systems   Constitutional: Positive for activity change, fatigue and unexpected weight change. HENT: Negative. Eyes: Negative. Respiratory: Positive for cough and shortness of breath. Cardiovascular: Negative. Negative for chest pain, palpitations and leg swelling. Gastrointestinal: Negative. Endocrine: Negative. Genitourinary: Negative. Musculoskeletal: Positive for arthralgias. Allergic/Immunologic: Negative. Neurological: Negative. Hematological: Negative. Psychiatric/Behavioral: Negative. Objective:     Physical Exam  Vitals and nursing note reviewed. Constitutional:       Appearance: He is well-developed. HENT:      Head: Normocephalic. Right Ear: Tympanic membrane and external ear normal.      Left Ear: Tympanic membrane and external ear normal.      Nose: Nose normal.   Eyes:      Pupils: Pupils are equal, round, and reactive to light. Neck:      Vascular: No carotid bruit. Cardiovascular:      Rate and Rhythm: Regular rhythm. Tachycardia present. Heart sounds: Normal heart sounds. Pulmonary:      Effort: Pulmonary effort is normal.      Breath sounds: Normal breath sounds. Abdominal:      General: Abdomen is flat. Musculoskeletal:         General: Normal range of motion. Lymphadenopathy:      Cervical: No cervical adenopathy. Skin:     General: Skin is warm and dry. Capillary Refill: Capillary refill takes less than 2 seconds. Neurological:      General: No focal deficit present. Mental Status: He is alert and oriented to person, place, and time. Mental status is at baseline. Psychiatric:         Mood and Affect: Mood normal.         Behavior: Behavior normal.         Thought Content: Thought content normal.         Judgment: Judgment normal.       BP (!) 150/80   Pulse 116   Temp 98.8 °F (37.1 °C) (Temporal)   Resp 16   Ht 5' 7\" (1.702 m)   Wt 169 lb (76.7 kg)   SpO2 99%   BMI 26.47 kg/m²     Assessment:       Diagnosis Orders   1. Post-COVID chronic cough  XR CHEST STANDARD (2 VW)    D-Dimer, Quantitative    CBC with Auto Differential    Comprehensive Metabolic Panel    TSH   2. Fatigue, unspecified type  XR CHEST STANDARD (2 VW)    D-Dimer, Quantitative    CBC with Auto Differential    Comprehensive Metabolic Panel    TSH   3. Short of breath on exertion  XR CHEST STANDARD (2 VW)    D-Dimer, Quantitative    CBC with Auto Differential    Comprehensive Metabolic Panel    TSH   4. Weight loss  D-Dimer, Quantitative    CBC with Auto Differential    Comprehensive Metabolic Panel    TSH   5. Dysuria  POCT Urinalysis no Micro    CBC with Auto Differential    Comprehensive Metabolic Panel    TSH         Plan:   More than 50% of the time was spent counseling and coordinating care for a total time of 35min face to face. His chest x-ray still has some interstitial infiltrates. I did go ahead and put him on Levaquin today  His urinalysis was completely normal.  XR CHEST STANDARD (2 VW)    Result Date: 2/24/2022  XR CHEST (2 VW) 2/24/2022 3:07 PM History: Post-Covid chronic cough. Fatigue. Shortness of breath with exertion. Two-view chest x-ray. No comparison. Diffuse interstitial prominence with mild basilar atelectasis. No pneumothorax or pleural effusion. No focal consolidation. Normal heart size. 1. Diffuse interstitial disease. Signed by Dr Ron Kim  No results found for this visit on 02/24/22. No results found.   PDMP Monitoring:    Last PDMP Erick Barker as Reviewed [facility]):  Review User Review Instant Review Result            Urine Drug Screenings (1 yr)    No resulted procedures found. Medication Contract and Consent for Opioid Use Documents Filed      No documents found                 Patient given educational materials -see patient instructions. Discussed use, benefit, and side effects of prescribed medications. All patient questions answered. Pt voiced understanding. Reviewed health maintenance. Instructed to continue currentmedications, diet and exercise. Patient agreed with treatment plan. Follow up as directed. MEDICATIONS:  Orders Placed This Encounter   Medications    levoFLOXacin (LEVAQUIN) 500 MG tablet     Sig: Take 1 tablet by mouth daily for 7 days     Dispense:  7 tablet     Refill:  0         ORDERS:  Orders Placed This Encounter   Procedures    XR CHEST STANDARD (2 VW)    D-Dimer, Quantitative    CBC with Auto Differential    Comprehensive Metabolic Panel    TSH    POCT Urinalysis no Micro       Follow-up:  Return in about 2 weeks (around 3/10/2022), or if symptoms worsen or fail to improve, for Follow-up on symptoms from today. Stefani Slaughter PATIENT INSTRUCTIONS:  Patient Instructions   Make sure you are taking a multivitamin with zinc vitamin D vitamin C  Mucinex or Robitussin to thin secretions  Start the Levaquin to cover your chest and prostate  Depending on your lab work to get a CT of your chest tomorrow. Or can wait and see if chest xray better after antibioticss  Finish steroids  Labs today will call you myself        Electronically signed by MICHELA Rodriguez CNP on 2/24/2022 at 3:53 PM    EMR Dragon/transcription disclaimer:  Much of thisencounter note is electronic transcription/translation of spoken language to printed texts. The electronic translation of spoken language may be erroneous, or at times, nonsensical words or phrases may be inadvertentlytranscribed.   Although I have reviewed the note for such errors, some may still exist.

## 2022-02-24 NOTE — PATIENT INSTRUCTIONS
Make sure you are taking a multivitamin with zinc vitamin D vitamin C  Mucinex or Robitussin to thin secretions  Start the Levaquin to cover your chest and prostate  Depending on your lab work to get a CT of your chest tomorrow.  Or can wait and see if chest xray better after antibioticss  Finish steroids  Labs today will call you myself

## 2022-03-10 ENCOUNTER — OFFICE VISIT (OUTPATIENT)
Dept: PRIMARY CARE CLINIC | Age: 72
End: 2022-03-10
Payer: MEDICARE

## 2022-03-10 ENCOUNTER — PATIENT MESSAGE (OUTPATIENT)
Dept: PRIMARY CARE CLINIC | Age: 72
End: 2022-03-10

## 2022-03-10 VITALS
OXYGEN SATURATION: 100 % | DIASTOLIC BLOOD PRESSURE: 68 MMHG | WEIGHT: 164 LBS | SYSTOLIC BLOOD PRESSURE: 108 MMHG | HEART RATE: 102 BPM | TEMPERATURE: 98.4 F | BODY MASS INDEX: 25.74 KG/M2 | RESPIRATION RATE: 18 BRPM | HEIGHT: 67 IN

## 2022-03-10 DIAGNOSIS — R05.3 POST-COVID CHRONIC COUGH: Primary | ICD-10-CM

## 2022-03-10 DIAGNOSIS — R63.4 WEIGHT LOSS: ICD-10-CM

## 2022-03-10 DIAGNOSIS — U09.9 POST-COVID CHRONIC COUGH: Primary | ICD-10-CM

## 2022-03-10 DIAGNOSIS — R06.02 SHORT OF BREATH ON EXERTION: ICD-10-CM

## 2022-03-10 PROCEDURE — G8427 DOCREV CUR MEDS BY ELIG CLIN: HCPCS | Performed by: NURSE PRACTITIONER

## 2022-03-10 PROCEDURE — 4040F PNEUMOC VAC/ADMIN/RCVD: CPT | Performed by: NURSE PRACTITIONER

## 2022-03-10 PROCEDURE — G8484 FLU IMMUNIZE NO ADMIN: HCPCS | Performed by: NURSE PRACTITIONER

## 2022-03-10 PROCEDURE — 99214 OFFICE O/P EST MOD 30 MIN: CPT | Performed by: NURSE PRACTITIONER

## 2022-03-10 PROCEDURE — 1123F ACP DISCUSS/DSCN MKR DOCD: CPT | Performed by: NURSE PRACTITIONER

## 2022-03-10 PROCEDURE — G8417 CALC BMI ABV UP PARAM F/U: HCPCS | Performed by: NURSE PRACTITIONER

## 2022-03-10 PROCEDURE — 1036F TOBACCO NON-USER: CPT | Performed by: NURSE PRACTITIONER

## 2022-03-10 PROCEDURE — 3017F COLORECTAL CA SCREEN DOC REV: CPT | Performed by: NURSE PRACTITIONER

## 2022-03-10 RX ORDER — NEOMYCIN SULFATE, POLYMYXIN B SULFATE, BACITRACIN ZINC, HYDROCORTISONE 3.5; 10000; 400; 1 MG/G; [USP'U]/G; [USP'U]/G; MG/G
OINTMENT OPHTHALMIC 2 TIMES DAILY
Qty: 1 EACH | Refills: 1 | Status: SHIPPED | OUTPATIENT
Start: 2022-03-10 | End: 2022-03-20

## 2022-03-10 RX ORDER — ZINC GLUCONATE 50 MG
50 TABLET ORAL DAILY
COMMUNITY

## 2022-03-10 RX ORDER — VIT C/B6/B5/MAGNESIUM/HERB 173 50-5-6-5MG
CAPSULE ORAL 3 TIMES DAILY
COMMUNITY

## 2022-03-10 RX ORDER — FERROUS SULFATE 325(65) MG
325 TABLET ORAL
COMMUNITY

## 2022-03-10 ASSESSMENT — ENCOUNTER SYMPTOMS: COUGH: 1

## 2022-03-10 NOTE — TELEPHONE ENCOUNTER
From: Riccardo Lundborg  To: Bertha Vigil  Sent: 3/10/2022 10:22 AM CST  Subject: Sty    I forgot to show you the sty and talk about it is there any meds for this? Whenever I'm sick it seems like it attacks my eyelids.

## 2022-03-10 NOTE — PROGRESS NOTES
[facility] PHYSICIAN SERVICES  [facility]  [address]  [address] [address]  Dept: [phone]  Dept Fax: [phone]  Loc: [phone]    Trenton Zapien is a 70 y.o. male who presents today for his medical conditions/complaints as noted below. Trenton Zapien is c/o of Fever (Pt had covid at the end of december) and Cough (Pt states he has had a cough since october)        HPI:     HPI   Chief Complaint   Patient presents with    Fever     Pt had covid at the end of december    Cough     Pt states he has had a cough since october   saw Him for a post COVID cough in February. He had had COVID at the end of December and had got the infusion for Covid. When I saw him again I did a chest x-ray which showed diffuse disease we did a D-dimer which was negative CBC that was barely elevated at  Wbc 11. CMP normal.  Nothing stood out. We did Levaquin and a steroid at that time encourage Mucinex vitamin D vitamin C. he is back today because he is finished all that he still having quite a bit of coughing. He is worse at night. He is doing plain Mucinex in the morning and Mucinex DM at night. He has lost a notable 30 pounds since last year. He said he thinks most of that is due to his wife changing their cooking to [employer]. He is not been having a fever anymore  Past Medical History:   Diagnosis Date    Chronic neck pain     HTN (hypertension)       History reviewed. No pertinent surgical history.     Vitals 3/10/2022 2/24/2022 2/24/2022 12/27/2021 12/27/2021 95/64/9394   SYSTOLIC 361 317 256 879 - 924   DIASTOLIC 68 80 80 70 - 79   Site - - - - - -   Position - - - - - -   Cuff Size - - - - - -   Pulse 102 - 116 95 - 105   Temp 98.4 - 98.8 100.2 - 100.6   Resp 18 - 16 18 - 16   SpO2 100 - 99 95 - 97   Weight 164 lb - 169 lb - - -   Height 5' 7\" - 5' 7\" - - -   Body mass index 25.68 kg/m2 - 26.47 kg/m2 - - -   Pain Level - - - - 0 -   Some recent data might be hidden       Family History   Problem Relation Age of Onset    Cancer Mother         lymphoma    Heart Disease Father        Social History     Tobacco Use    Smoking status: Former Smoker     Packs/day: 2.00     Years: 15.00     Pack years: 30.00     Types: Cigarettes     Quit date:      Years since quittin.2    Smokeless tobacco: Never Used   Substance Use Topics    Alcohol use: Yes     Comment: Occasional       Current Outpatient Medications on File Prior to Visit   Medication Sig Dispense Refill    turmeric (QC TUMERIC COMPLEX) 500 MG CAPS Take by mouth daily      zinc gluconate 50 MG tablet Take 50 mg by mouth daily      ferrous sulfate (IRON 325) 325 (65 Fe) MG tablet Take 325 mg by mouth daily (with breakfast)      KRILL OIL PO Take by mouth      Red Yeast Rice Extract (RED YEAST RICE PO) Take by mouth      Multiple Vitamins-Minerals (PRESERVISION AREDS) TABS Take by mouth      Probiotic Product (PROBIOTIC-10 PO) Take by mouth      Zn-Pyg Afri-Nettle-Saw Palmet (SAW PALMETTO COMPLEX PO) Take by mouth      amLODIPine (NORVASC) 5 MG tablet TAKE 1 TABLET NIGHTLY 90 tablet 3    benazepril (LOTENSIN) 20 MG tablet TAKE 1 TABLET EVERY DAY 90 tablet 3    Coenzyme Q10 (CO Q 10 PO) Take by mouth      Multiple Vitamins-Minerals (THERAPEUTIC MULTIVITAMIN-MINERALS) tablet Take 1 tablet by mouth daily      Vitamin D (CHOLECALCIFEROL) 1000 UNITS CAPS capsule Take 1,000 Units by mouth daily       niacin 500 MG CR capsule Take 500 mg by mouth nightly       aspirin 81 MG tablet Take 81 mg by mouth daily       No current facility-administered medications on file prior to visit.      No Known Allergies    Health Maintenance   Topic Date Due    Hepatitis C screen  Never done    COVID-19 Vaccine (1) Never done    Shingles Vaccine (1 of 2) Never done    Pneumococcal 65+ years Vaccine (1 of 1 - PPSV23) Never done    AAA screen  Never done    Colorectal Cancer Screen  2021    DTaP/Tdap/Td vaccine (1 - Tdap) 11/10/2022 (Originally 9/1/1969)    Flu vaccine (1) 03/10/2023 (Originally 9/1/2021)    Annual Wellness Visit (AWV)  10/09/2022    Depression Screen  12/26/2022    Potassium monitoring  02/24/2023    Creatinine monitoring  02/24/2023    Lipid screen  10/08/2026    Hepatitis A vaccine  Aged Out    Hepatitis B vaccine  Aged Out    Hib vaccine  Aged Out    Meningococcal (ACWY) vaccine  Aged Out       Subjective:      Review of Systems   Constitutional: Positive for fatigue and unexpected weight change. Negative for fever. HENT: Positive for congestion. Respiratory: Positive for cough. Psychiatric/Behavioral: Negative. Objective:     Physical Exam  Vitals and nursing note reviewed. Constitutional:       Appearance: He is well-developed. HENT:      Head: Normocephalic. Right Ear: External ear normal.      Left Ear: External ear normal.      Nose: Nose normal.   Cardiovascular:      Rate and Rhythm: Normal rate and regular rhythm. Heart sounds: Murmur heard. Pulmonary:      Effort: Pulmonary effort is normal.      Breath sounds: Normal breath sounds. Skin:     General: Skin is warm and dry. Capillary Refill: Capillary refill takes less than 2 seconds. Neurological:      General: No focal deficit present. Mental Status: He is alert and oriented to person, place, and time. Psychiatric:         Mood and Affect: Mood normal.         Behavior: Behavior normal.         Thought Content: Thought content normal.         Judgment: Judgment normal.       /68   Pulse 102   Temp 98.4 °F (36.9 °C)   Resp 18   Ht 5' 7\" (1.702 m)   Wt 164 lb (74.4 kg)   SpO2 100%   BMI 25.69 kg/m²     Assessment:       Diagnosis Orders   1. Post-COVID chronic cough  Elsie Calhoun MD, Pulmonary Disease, [facility]    CT CHEST W CONTRAST   2.  Short of breath on exertion  Elsie Calhoun MD, Pulmonary Disease, [facility]    CT CHEST W CONTRAST   3. Weight loss  Av. Aquiles Gama MD, Pulmonary Disease, [facility]    CT CHEST W CONTRAST         Plan:   More than 50% of the time was spent counseling and coordinating care for a total time of 32min face to face. I am very concerned about him. He has noticeably lost weight in the last year and I do not believe it is just from diet change. He says he is not short of breath but he appears short of breath with exertion and talking. He has a murmur which has not changed it does not sound any different. He is not having any peripheral swelling. He was a smoker for about 15 years in his younger years but not smoked since the [de-identified]. I did give him a sample inhaler today to start and he is in agreement with going ahead with a CT in the lung doctor. PDMP Monitoring:    Last PDMP Bart as Reviewed [facility]):  Review User Review Instant Review Result            Urine Drug Screenings (1 yr)    No resulted procedures found. Medication Contract and Consent for Opioid Use Documents Filed      No documents found                 Patient given educational materials -see patient instructions. Discussed use, benefit, and side effects of prescribed medications. All patient questions answered. Pt voiced understanding. Reviewed health maintenance. Instructed to continue currentmedications, diet and exercise. Patient agreed with treatment plan. Follow up as directed. MEDICATIONS:  No orders of the defined types were placed in this encounter. ORDERS:  Orders Placed This Encounter   Procedures    Venita Lemon MD, Pulmonary Disease, [facility]       Follow-up:  No follow-ups on file.     PATIENT INSTRUCTIONS:  Patient Instructions   Ct lung scan  Refer lung doctor  breztri 2 puffs daily for lungs       Electronically signed by MICHELA Baeza CNP on 3/10/2022 at 10:49 AM    EMR Dragon/transcription disclaimer:  Much of thisencounter note is electronic transcription/translation of spoken language to printed texts. The electronic translation of spoken language may be erroneous, or at times, nonsensical words or phrases may be inadvertentlytranscribed.   Although I have reviewed the note for such errors, some may still exist.

## 2022-03-15 ENCOUNTER — HOSPITAL ENCOUNTER (OUTPATIENT)
Dept: CT IMAGING | Age: 72
Discharge: HOME | End: 2022-03-15
Payer: MEDICARE

## 2022-03-15 DIAGNOSIS — R63.4 WEIGHT LOSS: ICD-10-CM

## 2022-03-15 DIAGNOSIS — R05.3 POST-COVID CHRONIC COUGH: ICD-10-CM

## 2022-03-15 DIAGNOSIS — U09.9 POST-COVID CHRONIC COUGH: ICD-10-CM

## 2022-03-15 DIAGNOSIS — R06.02 SHORT OF BREATH ON EXERTION: ICD-10-CM

## 2022-03-15 PROCEDURE — 6360000004 HC RX CONTRAST MEDICATION: Performed by: NURSE PRACTITIONER

## 2022-03-15 PROCEDURE — 71260 CT THORAX DX C+: CPT

## 2022-03-15 RX ADMIN — IOPAMIDOL 90 ML: 755 INJECTION, SOLUTION INTRAVENOUS at 11:29

## 2022-03-16 ENCOUNTER — NURSE ONLY (OUTPATIENT)
Dept: PRIMARY CARE CLINIC | Age: 72
End: 2022-03-16
Payer: MEDICARE

## 2022-03-16 DIAGNOSIS — R63.4 WEIGHT LOSS: ICD-10-CM

## 2022-03-16 DIAGNOSIS — R93.89 ABNORMAL CT OF THE CHEST: ICD-10-CM

## 2022-03-16 DIAGNOSIS — R06.02 SHORT OF BREATH ON EXERTION: Primary | ICD-10-CM

## 2022-03-16 PROCEDURE — 99211 OFF/OP EST MAY X REQ PHY/QHP: CPT | Performed by: NURSE PRACTITIONER

## 2022-03-16 NOTE — PROGRESS NOTES
PPD Placement note  Kit Archibald, 70 y.o. male is here today for placement of PPD test  Reason for PPD test: work  Pt taken PPD test before: yes  Verified in allergy area and with patient that they are not allergic to the products PPD is made of (Phenol or Tween). No  Is patient taking any oral or IV steroid medication now or have they taken it in the last month? no  Has the patient ever received the BCG vaccine?: no  Has the patient been in recent contact with anyone known or suspected of having active TB disease?: no  Patient's [place] of origin?: [place]  O: Alert and oriented in NAD. P:  PPD placed on 3/16/2022. Patient advised to return for reading within 48-72 hours.

## 2022-03-17 ENCOUNTER — PATIENT MESSAGE (OUTPATIENT)
Dept: PRIMARY CARE CLINIC | Age: 72
End: 2022-03-17

## 2022-03-17 DIAGNOSIS — R63.4 WEIGHT LOSS: ICD-10-CM

## 2022-03-17 DIAGNOSIS — C85.98 LYMPHOMA OF LYMPH NODES OF MULTIPLE REGIONS, UNSPECIFIED LYMPHOMA TYPE (HCC): Primary | ICD-10-CM

## 2022-03-17 DIAGNOSIS — R93.89 ABNORMAL CT OF THE CHEST: ICD-10-CM

## 2022-03-17 RX ORDER — DEXTROMETHORPHAN HYDROBROMIDE AND PROMETHAZINE HYDROCHLORIDE 15; 6.25 MG/5ML; MG/5ML
SYRUP ORAL
Qty: 120 ML | Refills: 0 | Status: SHIPPED | OUTPATIENT
Start: 2022-03-17 | End: 2022-06-02

## 2022-03-17 NOTE — TELEPHONE ENCOUNTER
From: Speedy Navas  To: Aristides Iyer  Sent: 3/17/2022 12:04 PM CDT  Subject: Sage's cough    Is there anything you give or recommend to alleviate Sage's all night coughing spells?  Thanks,  Pradip

## 2022-03-18 ENCOUNTER — NURSE ONLY (OUTPATIENT)
Dept: PRIMARY CARE CLINIC | Age: 72
End: 2022-03-18

## 2022-03-18 DIAGNOSIS — Z51.81 ANTICOAGULATION MANAGEMENT ENCOUNTER: Primary | ICD-10-CM

## 2022-03-18 DIAGNOSIS — Z79.01 ANTICOAGULATION MANAGEMENT ENCOUNTER: Primary | ICD-10-CM

## 2022-03-22 ENCOUNTER — HOSPITAL ENCOUNTER (OUTPATIENT)
Dept: CT IMAGING | Age: 72
Discharge: HOME | End: 2022-03-22
Payer: MEDICARE

## 2022-03-22 DIAGNOSIS — C85.98 LYMPHOMA OF LYMPH NODES OF MULTIPLE REGIONS, UNSPECIFIED LYMPHOMA TYPE (HCC): ICD-10-CM

## 2022-03-22 PROCEDURE — 6360000004 HC RX CONTRAST MEDICATION: Performed by: NURSE PRACTITIONER

## 2022-03-22 PROCEDURE — 74177 CT ABD & PELVIS W/CONTRAST: CPT

## 2022-03-22 PROCEDURE — 70491 CT SOFT TISSUE NECK W/DYE: CPT

## 2022-03-22 RX ADMIN — IOPAMIDOL 90 ML: 755 INJECTION, SOLUTION INTRAVENOUS at 13:39

## 2022-03-24 ENCOUNTER — OFFICE VISIT (OUTPATIENT)
Dept: OTOLARYNGOLOGY | Facility: CLINIC | Age: 72
End: 2022-03-24

## 2022-03-24 ENCOUNTER — OFFICE VISIT (OUTPATIENT)
Dept: HEMATOLOGY | Age: 72
End: 2022-03-24
Payer: MEDICARE

## 2022-03-24 ENCOUNTER — HOSPITAL ENCOUNTER (OUTPATIENT)
Dept: INFUSION THERAPY | Age: 72
Discharge: HOME | End: 2022-03-24
Payer: MEDICARE

## 2022-03-24 VITALS — SYSTOLIC BLOOD PRESSURE: 161 MMHG | DIASTOLIC BLOOD PRESSURE: 85 MMHG | HEART RATE: 110 BPM | TEMPERATURE: 99.6 F

## 2022-03-24 VITALS
HEART RATE: 78 BPM | SYSTOLIC BLOOD PRESSURE: 140 MMHG | BODY MASS INDEX: 25.08 KG/M2 | OXYGEN SATURATION: 98 % | WEIGHT: 160.1 LBS | DIASTOLIC BLOOD PRESSURE: 78 MMHG

## 2022-03-24 DIAGNOSIS — C85.90 LYMPHOMA, UNSPECIFIED BODY REGION, UNSPECIFIED LYMPHOMA TYPE (HCC): Primary | ICD-10-CM

## 2022-03-24 DIAGNOSIS — C85.98 LYMPHOMA OF LYMPH NODES OF MULTIPLE REGIONS, UNSPECIFIED LYMPHOMA TYPE (HCC): ICD-10-CM

## 2022-03-24 DIAGNOSIS — C85.90 LYMPHOMA, UNSPECIFIED BODY REGION, UNSPECIFIED LYMPHOMA TYPE (HCC): ICD-10-CM

## 2022-03-24 DIAGNOSIS — R93.89 ABNORMAL CT OF THE CHEST: ICD-10-CM

## 2022-03-24 DIAGNOSIS — R59.0 LEFT CERVICAL LYMPHADENOPATHY: Primary | ICD-10-CM

## 2022-03-24 DIAGNOSIS — C85.98 LYMPHOMA OF LYMPH NODES OF MULTIPLE SITES (HCC): Primary | ICD-10-CM

## 2022-03-24 DIAGNOSIS — C85.98 LYMPHOMA OF LYMPH NODES OF MULTIPLE SITES (HCC): ICD-10-CM

## 2022-03-24 DIAGNOSIS — Z71.89 CARE PLAN DISCUSSED WITH PATIENT: ICD-10-CM

## 2022-03-24 DIAGNOSIS — R63.4 WEIGHT LOSS: ICD-10-CM

## 2022-03-24 LAB
ALBUMIN SERPL-MCNC: 3.5 G/DL (ref 3.5–5.2)
ALP BLD-CCNC: 92 U/L (ref 40–130)
ALT SERPL-CCNC: 39 U/L (ref 21–72)
ANION GAP SERPL CALCULATED.3IONS-SCNC: 11 MMOL/L (ref 7–19)
AST SERPL-CCNC: 51 U/L (ref 17–59)
BILIRUB SERPL-MCNC: 0.5 MG/DL (ref 0.2–1.3)
BUN BLDV-MCNC: 20 MG/DL (ref 9–20)
CALCIUM SERPL-MCNC: 8.2 MG/DL (ref 8.4–10.2)
CHLORIDE BLD-SCNC: 103 MMOL/L (ref 98–111)
CO2: 26 MMOL/L (ref 22–29)
CREAT SERPL-MCNC: 0.6 MG/DL (ref 0.6–1.2)
GFR NON-AFRICAN AMERICAN: >60
GLUCOSE BLD-MCNC: 96 MG/DL (ref 74–106)
HCT VFR BLD CALC: 31.3 % (ref 40.1–51)
HEMOGLOBIN: 10.1 G/DL (ref 13.7–17.5)
LACTATE DEHYDROGENASE: 686 U/L (ref 313–618)
MCH RBC QN AUTO: 30.1 PG (ref 25.7–32.2)
MCHC RBC AUTO-ENTMCNC: 32.3 G/DL (ref 32.3–36.5)
MCV RBC AUTO: 93.4 FL (ref 79–92.2)
PDW BLD-RTO: 16.4 % (ref 11.6–14.4)
PLATELET # BLD: 280 K/UL (ref 163–337)
PMV BLD AUTO: 8.8 FL (ref 7.4–10.4)
POTASSIUM SERPL-SCNC: 4.1 MMOL/L (ref 3.5–5.1)
RBC # BLD: 3.35 M/UL (ref 4.63–6.08)
SODIUM BLD-SCNC: 140 MMOL/L (ref 137–145)
TOTAL PROTEIN: 7.6 G/DL (ref 6.3–8.2)
URIC ACID, SERUM: 3.6 MG/DL (ref 3.5–8.5)
WBC # BLD: 8.7 K/UL (ref 4.23–9.07)

## 2022-03-24 PROCEDURE — G8484 FLU IMMUNIZE NO ADMIN: HCPCS | Performed by: INTERNAL MEDICINE

## 2022-03-24 PROCEDURE — 84550 ASSAY OF BLOOD/URIC ACID: CPT

## 2022-03-24 PROCEDURE — 80053 COMPREHEN METABOLIC PANEL: CPT

## 2022-03-24 PROCEDURE — 1123F ACP DISCUSS/DSCN MKR DOCD: CPT | Performed by: INTERNAL MEDICINE

## 2022-03-24 PROCEDURE — 99211 OFF/OP EST MAY X REQ PHY/QHP: CPT

## 2022-03-24 PROCEDURE — 83615 LACTATE (LD) (LDH) ENZYME: CPT

## 2022-03-24 PROCEDURE — G8427 DOCREV CUR MEDS BY ELIG CLIN: HCPCS | Performed by: INTERNAL MEDICINE

## 2022-03-24 PROCEDURE — 85027 COMPLETE CBC AUTOMATED: CPT

## 2022-03-24 PROCEDURE — G8417 CALC BMI ABV UP PARAM F/U: HCPCS | Performed by: INTERNAL MEDICINE

## 2022-03-24 PROCEDURE — 99204 OFFICE O/P NEW MOD 45 MIN: CPT | Performed by: OTOLARYNGOLOGY

## 2022-03-24 PROCEDURE — 36415 COLL VENOUS BLD VENIPUNCTURE: CPT

## 2022-03-24 PROCEDURE — 4040F PNEUMOC VAC/ADMIN/RCVD: CPT | Performed by: INTERNAL MEDICINE

## 2022-03-24 PROCEDURE — 1036F TOBACCO NON-USER: CPT | Performed by: INTERNAL MEDICINE

## 2022-03-24 PROCEDURE — 99205 OFFICE O/P NEW HI 60 MIN: CPT | Performed by: INTERNAL MEDICINE

## 2022-03-24 PROCEDURE — 3017F COLORECTAL CA SCREEN DOC REV: CPT | Performed by: INTERNAL MEDICINE

## 2022-03-24 RX ORDER — MULTIPLE VITAMINS W/ MINERALS TAB 9MG-400MCG
1 TAB ORAL DAILY
COMMUNITY

## 2022-03-24 RX ORDER — VIT C/B6/B5/MAGNESIUM/HERB 173 50-5-6-5MG
CAPSULE ORAL
COMMUNITY
End: 2022-03-25

## 2022-03-24 RX ORDER — ZINC GLUCONATE 50 MG
50 TABLET ORAL DAILY
COMMUNITY

## 2022-03-24 RX ORDER — LANOLIN ALCOHOL/MO/W.PET/CERES
500 CREAM (GRAM) TOPICAL NIGHTLY
COMMUNITY

## 2022-03-24 RX ORDER — FERROUS SULFATE 325(65) MG
325 TABLET ORAL
COMMUNITY

## 2022-03-24 RX ORDER — AMLODIPINE BESYLATE 5 MG/1
5 TABLET ORAL DAILY
COMMUNITY
Start: 2022-01-15

## 2022-03-24 RX ORDER — BENAZEPRIL HYDROCHLORIDE 20 MG/1
20 TABLET ORAL DAILY
COMMUNITY
Start: 2022-01-15

## 2022-03-24 NOTE — PATIENT INSTRUCTIONS
Excisional biopsy left cervical lymphadenopathy and possible right cervical lymph node excision were discussed with the patient.  The risk benefits and options were covered and the patient as well as his wife understands risks and benefits and wishes to proceed.    Patient and family were instructed on the proper use of scheduled prescription drugs including their impact on driving and the potential effects during pregnancy.  The potential for overdose was discussed and their safe storage and proper disposal.  The website www.[employer].[place].gov which contains other materials in this regard.

## 2022-03-24 NOTE — PROGRESS NOTES
YOB: 1950  Location: [facility]  Location Address: [address] [place] [address],  [address], [address] [place] [address]  Location Phone: [phone]    Chief Complaint   Patient presents with   • Lymphoma of lymph nodes       History of Present Illness  Yamil Hays is a 71 y.o. male.  Yamil Hays is here for evaluation of ENT complaints. The patient has had problems with lymphadenopathy   The symptoms are localized to the right level 3 neck. The dominant nodes are seen in the left level 4 and left level 5 neck. The patient has had moderate symptoms. The symptoms have been present for the last several months There have been no identified factors that aggravate the symptoms. There have been no factors that have improved the symptoms. Patient states he is being diagnosed for a type of lymphoma. He has cough, shortness of breath, fever and swelling. Patient has had CT neck at [facility].               CT SOFT TISSUE NECK W CONTRAST    Anatomical Region Laterality Modality   Neck -- Computed Tomography   C-spine -- --       Impression    1. History of lymphoma with multifocal adenopathy, extending up to the   right level 3 neck. The dominant nodes are seen in the left level 4   and left level 5 neck as well as in the left supraclavicular fossa and   upper mediastinum.   2. Degenerative spinal stenosis.   3. Atheromatous calcification in the carotid bulbs.   Signed by Dr Timbo Wooten    Narrative    CT SOFT TISSUE NECK W CONTRAST 3/22/2022 1:40 PM   HISTORY: C85.98   COMPARISON: Chest CT dated 3/15/2022   DLP: 1066 mGy cm   TECHNIQUE: Serial helical tomographic images of the neck were obtained   in the standard fashion following the intravenous administration of   Isovue contrast.  Automated exposure control was also utilized to   decrease patient radiation dose.   FINDINGS:     Visualized intracranial contents are unremarkable. Orbital contents   are unremarkable. Paranasal sinuses and mastoids are clear. Major    salivary glands are unremarkable. Pharyngeal and laryngeal airway are   unremarkable. Floor of mouth soft tissues are unremarkable. Cervical   adenopathy identified, including a right level 3 node measuring 1.6 cm   in greatest axial diameter (series 4-image 95). There is a left level   5 lymph node measuring 1.7 cm on axial image 102. Multiple enlarged   left level 4 nodes, including a 2.3 cm node on axial image 114.   Adenopathy extends down into the left supraclavicular fossa and there   is bulky adenopathy also appreciated in the upper mediastinum.   Cervical vascular structures are patent. Calcified atheromatous plaque   in both carotid bulbs. Nonspecific groundglass opacities identified in   the periphery of both upper lobes. Prominent calcified posterior disc   osteophyte with acquired spinal stenosis at C5-C6. No acute bony   abnormality.    Procedure Note    Timbo Wooten MD - 03/22/2022   Formatting of this note might be different from the original.   CT SOFT TISSUE NECK W CONTRAST 3/22/2022 1:40 PM   HISTORY: C85.98   COMPARISON: Chest CT dated 3/15/2022   DLP: 1066 mGy cm   TECHNIQUE: Serial helical tomographic images of the neck were obtained   in the standard fashion following the intravenous administration of   Isovue contrast.  Automated exposure control was also utilized to   decrease patient radiation dose.   FINDINGS:     Visualized intracranial contents are unremarkable. Orbital contents   are unremarkable. Paranasal sinuses and mastoids are clear. Major   salivary glands are unremarkable. Pharyngeal and laryngeal airway are   unremarkable. Floor of mouth soft tissues are unremarkable. Cervical   adenopathy identified, including a right level 3 node measuring 1.6 cm   in greatest axial diameter (series 4-image 95). There is a left level   5 lymph node measuring 1.7 cm on axial image 102. Multiple enlarged   left level 4 nodes, including a 2.3 cm node on axial image 114.   Adenopathy extends  down into the left supraclavicular fossa and there   is bulky adenopathy also appreciated in the upper mediastinum.   Cervical vascular structures are patent. Calcified atheromatous plaque   in both carotid bulbs. Nonspecific groundglass opacities identified in   the periphery of both upper lobes. Prominent calcified posterior disc   osteophyte with acquired spinal stenosis at C5-C6. No acute bony   abnormality.   IMPRESSION:   1. History of lymphoma with multifocal adenopathy, extending up to the   right level 3 neck. The dominant nodes are seen in the left level 4   and left level 5 neck as well as in the left supraclavicular fossa and   upper mediastinum.   2. Degenerative spinal stenosis.   3. Atheromatous calcification in the carotid bulbs.   Signed by Dr Timbo Wooten  Exam End: 03/22/22  1:40 PM Last Resulted: 03/22/22  3:34 PM   Received From: [facility] [place]  Result Received: 03/24/22  1:39 PM          Past Medical History:   Diagnosis Date   • Hypertension        Past Surgical History:   Procedure Laterality Date   • EYE SURGERY         Outpatient Medications Marked as Taking for the 3/24/22 encounter (Office Visit) with Alirio Yarbrough MD   Medication Sig Dispense Refill   • amLODIPine (NORVASC) 5 MG tablet      • benazepril (LOTENSIN) 20 MG tablet      • Cholecalciferol 25 MCG (1000 UT) capsule Take 1,000 Units by mouth.     • ferrous sulfate 325 (65 FE) MG tablet Take 325 mg by mouth.     • multivitamin with minerals tablet tablet Take  by mouth.     • niacin (NIACIN SR,NIASPAN ER) 500 MG CR capsule Take 500 mg by mouth.     • Turmeric 500 MG capsule Take  by mouth.     • zinc gluconate 50 MG tablet Take 50 mg by mouth Daily.         Patient has no known allergies.    Family History   Problem Relation Age of Onset   • Cancer Mother    • Heart failure Father        Social History     Socioeconomic History   • Marital status:    Tobacco Use   • Smoking status: Former Smoker   • Smokeless  tobacco: Never Used   Substance and Sexual Activity   • Alcohol use: Not Currently   • Drug use: Never       Review of Systems   Constitutional: Positive for fever.   HENT:        Cervical lymphadenopathy   Eyes: Negative.    Respiratory: Positive for cough and shortness of breath.    Cardiovascular: Negative.    Gastrointestinal: Negative.    Endocrine: Negative.    Genitourinary: Negative.    Musculoskeletal: Negative.    Skin: Negative.    Allergic/Immunologic: Negative.    Neurological: Negative.    Hematological: Negative.    Psychiatric/Behavioral: Negative.        Vitals:    03/24/22 1521   BP: 161/85   Pulse: 110   Temp: 99.6 °F (37.6 °C)       There is no height or weight on file to calculate BMI.    Objective     Physical Exam  CONSTITUTIONAL: well nourished, well-developed, alert, oriented, in no acute distress     COMMUNICATION AND VOICE: able to communicate normally, normal voice quality    HEAD: normocephalic, no lesions, atraumatic, no tenderness, no masses     FACE: appearance normal, no lesions, no tenderness, no deformities, facial motion symmetric    EYES: ocular motility normal, eyelids normal, orbits normal, no proptosis, conjunctiva normal , pupils equal, round     EARS:  Hearing: hearing to conversational voice intact bilaterally   External Ears: normal bilaterally, no lesions    NOSE:  External Nose: external nasal structure normal, no tenderness on palpation, no nasal discharge, no lesions, no evidence of trauma, nostrils patent     ORAL:  Lips: upper and lower lips without lesion     NECK:  Inspection and Palpation: neck appearance normal, bilateral palpable cervical adenopathy predominantly in the left level 4 and 5 neck with moderate adenopathy in the right level 3 and 4 neck.      CHEST/RESPIRATORY: normal respiratory effort     CARDIOVASCULAR: no cyanosis or edema     NEUROLOGICAL/PSYCHIATRIC: oriented to time, place and person, mood normal, affect appropriate, CN II-XII intact  grossly    Assessment/Plan   Diagnoses and all orders for this visit:    1. Left cervical lymphadenopathy (Primary)  -     Case Request; Standing  -     COVID PRE-OP / PRE-PROCEDURE SCREENING ORDER (NO ISOLATION) - Swab, Nasopharynx; Future  -     CBC and Differential; Future  -     ECG 12 Lead; Future  -     XR Chest 2 View; Future  -     Case Request    Other orders  -     Follow Anesthesia Guidelines / Standing Orders; Future  -     Obtain Informed Consent      Excisional biopsy left cervical lymph node (Left)  Orders Placed This Encounter   Procedures   • COVID PRE-OP / PRE-PROCEDURE SCREENING ORDER (NO ISOLATION) - Swab, Nasopharynx     Standing Status:   Future     Standing Expiration Date:   3/24/2023     Order Specific Question:   [facility]     [facility]     Order Specific Question:   COVID Screening Order:     Answer:   In-House: TARA TAI, 24 HR TAT [[id number]]     Order Specific Question:   Employed in healthcare setting?     Answer:   No     Order Specific Question:   Symptomatic for COVID-19 as defined by CDC?     Answer:   No     Order Specific Question:   Hospitalized for COVID-19?     Answer:   No     Order Specific Question:   Admitted to ICU for COVID-19?     Answer:   No     Order Specific Question:   Resident in a congregate (group) care setting?     Answer:   No     Order Specific Question:   Release to patient     Answer:   Immediate   • XR Chest 2 View     Standing Status:   Future     Standing Expiration Date:   3/24/2023     Order Specific Question:   Reason for Exam:     Answer:   Excisional biopsy left cervical lymph node   • Follow Anesthesia Guidelines / Standing Orders     Standing Status:   Future   • Obtain Informed Consent     EXCISION LESION with possible flap or graft-     Order Specific Question:   Informed Consent Given For     Answer:   EXCISION LESION with possible flap or graft-   • ECG 12 Lead     Standing Status:   Future     Standing  Expiration Date:   3/24/2023     Order Specific Question:   Reason for Exam:     Answer:   EXCISION LESION   • CBC and Differential     Standing Status:   Future     Standing Expiration Date:   3/24/2023     Order Specific Question:   Manual Differential     Answer:   No     Return for postop.       Patient Instructions     Excisional biopsy left cervical lymphadenopathy and possible right cervical lymph node excision were discussed with the patient.  The risk benefits and options were covered and the patient as well as his wife understands risks and benefits and wishes to proceed.    Patient and family were instructed on the proper use of scheduled prescription drugs including their impact on driving and the potential effects during pregnancy.  The potential for overdose was discussed and their safe storage and proper disposal.  The website www.[employer].[place].gov which contains other materials in this regard.

## 2022-03-24 NOTE — PROGRESS NOTES
MEDICAL ONCOLOGY CONSULTATION    Pt Name: Leslee Momin  MRN: [id number]  YOB: 1950  Date of evaluation: 3/24/2022    REASON FOR CONSULTATION: Generalized lymphadenopathy  REQUESTING PHYSICIAN: Family physician    History Obtained From:    patient, electronic medical record    HISTORY OF PRESENT ILLNESS:    Diagnosis  · Generalized adenopathy, March 2022    Treatment summary  · To be determined    Hematology history  Bela Raman was first seen by me on 3/24/2022. The patient was referred by his primary care provider for findings of generalized lymphadenopathy. The patient has had symptoms of weight loss, night sweats and low-grade fever. This seems has been going on for many months now. Patient had Covid 19 infection last year. He had CT scans that showed generalized adenopathy. · 3/22/2022-CT soft tissue neck showed findings of cervical adenopathy identified, including a right level 3 node measuring 1.6 cm in greatest axial diameter (series 4-image 95). There is a left level 5 lymph node measuring 1.7 cm on axial image 102. Multiple enlarged left level 4 nodes, including a 2.3 cm node on axial image 114. Adenopathy extends down into the left supraclavicular fossa and there is bulky adenopathy also appreciated in the upper mediastinum. · 3/22/2022-CT chest with contrast showed findings of mediastinal adenopathy. Subcarinal lymph nodes are present. Right hilar lymph nodes are present. Left para-aortic adenopathy is present in the abdomen. Concern for lymphoma. · 3/22/2002-CT abdomen/pelvis showed spleen is enlarged measuring 14 cm craniocaudal dimension. Bilateral renal cysts including dominant 5.4 cm RIGHT upper pole exophytic renal cyst. 3 mm nonobstructing LEFT lower pole renal calculus. Prostate is mildly enlarged measuring 4.8 cm transverse dimension. Bulky retroperitoneal lymphadenopathy is noted. Noted conglomerate in the LEFT periaortic region on image 34 series 4 measures 6.0 x 3.8 cm. Enlarged gastrohepatic ligament lymph nodes with reference node on image 23 measuring 14 mm short axis dimension. No enlarged pelvic or inguinal lymph nodes identified. Bilateral chronic L5 pars defects with grade 1 anterolisthesis of L5 on S1. No acute osseous finding. · 3/24/2022- (H), hemoglobin 10.1/MCV 93, platelets 245,664, WBC 8.7  · 3/24/2022-she was first seen by me. Recommended excisional biopsy left supraclavicular lymph node. Past Medical History:    Past Medical History:   Diagnosis Date    Chronic neck pain     Herniated disc, cervical     HTN (hypertension)        Past Surgical History:    No past surgical history on file.     Social History:    Marital status:  Smoking status:Former smoker, Quit more 25 years ago  ETOH status:No   Resides:[place]    Family History:   Family History   Problem Relation Age of Onset    Cancer Mother         lymphoma    Heart Disease Father        Current Hospital Medications:    Current Outpatient Medications   Medication Sig Dispense Refill    promethazine-dextromethorphan (PROMETHAZINE-DM) 6.25-15 MG/5ML syrup 1-2 tsp every hs  prn cough 120 mL 0    turmeric (QC TUMERIC COMPLEX) 500 MG CAPS Take by mouth daily      zinc gluconate 50 MG tablet Take 50 mg by mouth daily      ferrous sulfate (IRON 325) 325 (65 Fe) MG tablet Take 325 mg by mouth daily (with breakfast)      KRILL OIL PO Take by mouth      Red Yeast Rice Extract (RED YEAST RICE PO) Take by mouth      Multiple Vitamins-Minerals (PRESERVISION AREDS) TABS Take by mouth      Probiotic Product (PROBIOTIC-10 PO) Take by mouth      Zn-Pyg Afri-Nettle-Saw Palmet (SAW PALMETTO COMPLEX PO) Take by mouth      amLODIPine (NORVASC) 5 MG tablet TAKE 1 TABLET NIGHTLY 90 tablet 3    benazepril (LOTENSIN) 20 MG tablet TAKE 1 TABLET EVERY DAY 90 tablet 3    Coenzyme Q10 (CO Q 10 PO) Take by mouth      Multiple Vitamins-Minerals (THERAPEUTIC MULTIVITAMIN-MINERALS) tablet Take 1 tablet by mouth daily      Vitamin D (CHOLECALCIFEROL) 1000 UNITS CAPS capsule Take 1,000 Units by mouth daily       niacin 500 MG CR capsule Take 500 mg by mouth nightly       aspirin 81 MG tablet Take 81 mg by mouth daily       No current facility-administered medications for this visit. Allergies: No Known Allergies      Subjective   REVIEW OF SYSTEMS:   CONSTITUTIONAL: no fever, no night sweats, chills,fever,pain, night sweats, unintentional weight loss; fatigue;  HEENT: no blurring of vision, no double vision, no hearing difficulty, no tinnitus, no ulceration, no epistaxis;  LUNGS:congestion, cough, no hemoptysis, no wheeze, shortness of breath;  CARDIOVASCULAR: no palpitation, no chest pain, no shortness of breath;  GI:heartburn, no abdominal pain, no nausea, no vomiting, no diarrhea, no constipation;  KARINA: no dysuria, no hematuria, no frequency or urgency, no nephrolithiasis;  MUSCULOSKELETAL:chronic back pain,  joint pain, no swelling, no stiffness;  ENDOCRINE: no polyuria, no polydipsia, no heat intolerance, cold intolerance;  HEMATOLOGY: no easy bruising or bleeding, no history of clotting disorder;  DERMATOLOGY: no skin rash, no eczema, no pruritus;  PSYCHIATRY: no depression, no anxiety  NEUROLOGY: no syncope, no seizures, no numbness or tingling of hands, no numbness or tingling of feet, no paresis;    Objective   BP (!) 140/78   Pulse 78   Wt 160 lb 1.6 oz (72.6 kg)   SpO2 98%   BMI 25.08 kg/m²     PHYSICAL EXAM:  CONSTITUTIONAL: Alert, appropriate, no acute distress  EYES: Non icteric, EOM intact, pupils equal round   ENT:Hx of dental problems, Mucus membranes moist,external inspection of ears and nose are normal  NECK: Supple, no masses. No palpable thyroid mass  CHEST/LUNGS: CTA bilaterally, normal respiratory effort   CARDIOVASCULAR: RRR, no murmurs. No lower extremity edema  ABDOMEN: soft non-tender, active bowel sounds, no HSM.   No palpable masses  EXTREMITIES: warm, full ROM in all 4 extremities, no focal weakness. SKIN: warm, dry with no rashes or lesions  LYMPH: No cervical, clavicular, axillary, or inguinal lymphadenopathy  NEUROLOGIC: follows commands, non focal   PSYCH: mood and affect appropriate. Alert and oriented to time, place, person        LABORATORY RESULTS REVIEWED/ANALYZED BY ME:  Recent Labs     03/24/22  1132 02/24/22  1358   WBC 8.70 11.1*   HGB 10.1* 12.7*   HCT 31.3* 39.7*   MCV 93.4* 90.8    296       Lab Results   Component Value Date     (L) 02/24/2022    K 4.5 02/24/2022    CL 96 (L) 02/24/2022    CO2 25 02/24/2022    BUN 23 02/24/2022    CREATININE 0.8 02/24/2022    GLUCOSE 114 (H) 02/24/2022    CALCIUM 8.9 02/24/2022    PROT 8.1 02/24/2022    LABALBU 3.5 02/24/2022    BILITOT 0.3 02/24/2022    ALKPHOS 86 02/24/2022    AST 40 02/24/2022    ALT 57 (H) 02/24/2022    LABGLOM >60 02/24/2022    GFRAA >59 02/24/2022    AGRATIO 1.5 09/19/2016    GLOB 3.0 09/19/2016     Lab Results   Component Value Date     03/24/2022    K 4.1 03/24/2022     03/24/2022    CO2 26 03/24/2022    BUN 20 03/24/2022    CREATININE 0.6 03/24/2022    GLUCOSE 96 03/24/2022    CALCIUM 8.2 (L) 03/24/2022    PROT 7.6 03/24/2022    LABALBU 3.5 03/24/2022    BILITOT 0.5 03/24/2022    ALKPHOS 92 03/24/2022    AST 51 03/24/2022    ALT 39 03/24/2022    LABGLOM >60 03/24/2022    GFRAA >59 02/24/2022    AGRATIO 1.5 09/19/2016    GLOB 3.0 09/19/2016       RADIOLOGY STUDIES REPORT/REVIEWED AND INTERPRETED BY ME:  XR CHEST STANDARD (2 VW)    Result Date: 2/24/2022  XR CHEST (2 VW) 2/24/2022 3:07 PM History: Post-Covid chronic cough. Fatigue. Shortness of breath with exertion. Two-view chest x-ray. No comparison. Diffuse interstitial prominence with mild basilar atelectasis. No pneumothorax or pleural effusion. No focal consolidation. Normal heart size. 1. Diffuse interstitial disease.  Signed by Dr Ivanna Jaime    Result Date: 3/22/2022  CT SOFT TISSUE NECK W CONTRAST 3/22/2022 1:40 PM HISTORY: C85.98 COMPARISON: Chest CT dated 3/15/2022 DLP: 1066 mGy cm TECHNIQUE: Serial helical tomographic images of the neck were obtained in the standard fashion following the intravenous administration of Isovue contrast.  Automated exposure control was also utilized to decrease patient radiation dose. FINDINGS:  Visualized intracranial contents are unremarkable. Orbital contents are unremarkable. Paranasal sinuses and mastoids are clear. Major salivary glands are unremarkable. Pharyngeal and laryngeal airway are unremarkable. Floor of mouth soft tissues are unremarkable. Cervical adenopathy identified, including a right level 3 node measuring 1.6 cm in greatest axial diameter (series 4-image 95). There is a left level 5 lymph node measuring 1.7 cm on axial image 102. Multiple enlarged left level 4 nodes, including a 2.3 cm node on axial image 114. Adenopathy extends down into the left supraclavicular fossa and there is bulky adenopathy also appreciated in the upper mediastinum. Cervical vascular structures are patent. Calcified atheromatous plaque in both carotid bulbs. Nonspecific groundglass opacities identified in the periphery of both upper lobes. Prominent calcified posterior disc osteophyte with acquired spinal stenosis at C5-C6. No acute bony abnormality. 1. History of lymphoma with multifocal adenopathy, extending up to the right level 3 neck. The dominant nodes are seen in the left level 4 and left level 5 neck as well as in the left supraclavicular fossa and upper mediastinum. 2. Degenerative spinal stenosis. 3. Atheromatous calcification in the carotid bulbs.  Signed by Dr Marice Paget    Result Date: 3/15/2022  EXAMINATION: CT CHEST W CONTRAST 3/15/2022 12:42 PM HISTORY: CT CHEST W CONTRAST 3/15/2022 10:51 AM HISTORY: R05.3 COMPARISON: None DLP: 395 mGy cm Automated exposure control was also utilized to decrease patient radiation dose TECHNIQUE: Serial helical tomographic images of the chest were acquired following the infusion of Isovue contrast intravenously. Bone and soft tissue algorithms were provided. FINDINGS: Neck base: Adenopathy is noted in the base of the neck greater on the left than the right. . Lungs: Interstitial fibrotic changes noted in the periphery of the right and left lung. This may be chronic interstitial fibrosis. Vonne Dach No pleural effusion is present. The trachea and bronchial tree are patent. Heart: The heart is normal in size. There is no pericardial effusion. Great vessels: The great vessels are normal in caliber and are patent. Pulmonary arteries normal as visualized. Coronary calcifications are present. . Lymph nodes: Extensive mediastinal adenopathy is present. Paratracheal subcarinal lymph nodes are present right hilar lymph nodes are present. Smaller left hilar lymph nodes are present. This adenopathy may be associated with the lymphoma. . Skeletal and soft tissues: The osseous structures of the thorax and surrounding soft tissues demonstrate no worrisome lesion or acute process. Upper abdomen: In the abdomen there are low-density lesions in the liver probably cysts. Calculi are present in the gallbladder. Left para-aortic retroperitoneal adenopathy is present. .    1. Mediastinal adenopathy is present. Subcarinal lymph nodes are present. Right hilar lymph nodes are present. Left para-aortic adenopathy is present in the abdomen. This may represent lymphoma. 2. Interstitial fibrosis in the lungs. 3. Cholelithiasis. 4 hepatic cysts Signed by Dr Eric Singh Additional Contrast? Oral    Result Date: 3/22/2022  EXAM: CT ABDOMEN PELVIS W IV CONTRAST INDICATION: Lymphoma COMPARISON: No prior abdominal imaging. Chest CT 3/15/2022 DLP: 693 mGy cm.  In order to have a CT radiation dose as low as reasonably achievable, Automated Exposure Control was utilized for adjustment of the mA and/or KV according to patient size. FINDINGS: Interstitial fibrotic lung disease in the included lung bases with nodular area of consolidation in the subpleural RIGHT lower lobe on image 15 which may relate atelectasis or an infectious/inflammatory process. Multiple scattered bilobar liver lesions are too small to characterize but likely cysts. Multiple layering calcified gallstones are present in the gallbladder lumen. No gallbladder wall thickening or biliary ductal dilatation. The pancreas and adrenal glands are unremarkable. The spleen is enlarged measuring 14 cm craniocaudal dimension. Bilateral renal cysts including dominant 5.4 cm RIGHT upper pole exophytic renal cyst. 3 mm nonobstructing LEFT lower pole renal calculus. No ureteral stones or hydronephrosis. No focal urinary bladder wall thickening. Normal appendix. Diffuse circumferential distal esophageal wall thickening. No abnormal bowel distention. No evidence of active bowel inflammation involving the small bowel or colon. No ascites or free pelvic fluid. Prostate is mildly enlarged measuring 4.8 cm transverse dimension. Nonaneurysmal atherosclerotic abdominal aorta. Bulky retroperitoneal lymphadenopathy is noted. No conglomerate in the LEFT periaortic region on image 34 series 4 measures 6.0 x 3.8 cm. Enlarged gastrohepatic ligament lymph nodes with reference node on image 23 measuring 14 mm short axis dimension. No enlarged pelvic or inguinal lymph nodes identified. No acute abdominal wall soft tissue abnormality. Bilateral chronic L5 pars defects with grade 1 anterolisthesis of L5 on S1. No acute osseous finding. 1.  Bulky retroperitoneal lymphadenopathy. Gastrohepatic ligament lymphadenopathy is also present. Spleen is mildly enlarged measuring 14 cm craniocaudal dimension. Findings are in keeping with provided history of lymphoma. 2.  Circumferential distal esophageal wall thickening. Correlate for esophagitis. 3.  Cholelithiasis.  4.  3 mm nonobstructing LEFT renal calculus. 5. Interstitial fibrotic lung disease in the included lung bases. Signed by Dr Cuong Wisdom            My interpretation: Generalized adenopathy in the neck, mediastinum and abdomen. ASSESSMENT:  #Generalized adenopathy with B symptoms  The patient was counseled today about diagnosis, staging, prognosis, diagnostic tests, medications, side effects and disease management. Discussed the patient the differential diagnosis include lymphoma. He has generalized adenopathy raising the concern for a lymphoproliferative process. Discussed with ENT, Dr. Miroslava Boyle. He will see the patient today and discuss excisional lymph node biopsy of a left supraclavicular lymph node  -PET scan and bone marrow biopsy after biopsy. Pulmonary interstitial fibrosis-I reviewed the CT scan with the patient. He has complaints of short of breath on exertion. He has findings of interstitial fibrosis. I offered a pulmonology consultation but he wants to defer this at this time. PLAN:  · RTC with MD in 11 days   · Refer to ENT Kamilah Seymour at [facility]-3/24/2022  · Recommended PET Scan  · Recommended Bone Marrow biopsy  · Consider Pulmonology   · CBC,CMP,LDH, B2M Today      ISeven am scribing as Medical Assistant for Ursula Woods MD. Electronically signed by Seven Merlos MA on 3/24/2022 at 11:45 AM CDT. I, Dr Trudy Esquivel, personally performed the services described in this documentation as scribed by Seven Merlos MA in my presence and is both accurate and complete. I have seen, examined and reviewed this patient medication list, appropriate labs and imaging studies. I reviewed relevant medical records and others physicians notes. I discussed the plans of care with the patient. I answered all the questions to the patients satisfaction.  I have also reviewed the chief complaint (CC) and part of the history (History of Present Illness (HPI), Past Family Social History ([facility]), or Review of Systems (ROS) and made changes when appropriated. (Please note that portions of this note were completed with a voice recognition program. Efforts were made to edit the dictations but occasionally words are mis-transcribed. )Electronically signed by Daniel Lake MD on 3/24/2022 at 11:58 AM      The total time (60min) I spent to see the patient today includes at least one or more of the following: preparing to see the patient by reviewing prior tests, prior notes or other relevant information, performing appropriate independent examination and evaluation, counseling, ordering of medications, tests or procedures, referrals, communicating with other healthcare professionals when appropriated to coordinate care, documenting clinic information in the electronic medical record or other health records, independently interpreting results of tests, managing test results and communicating the results to the patient/family or caregiver.        Daniel Lake MD    03/24/22  11:58 AM

## 2022-03-25 ENCOUNTER — LAB (OUTPATIENT)
Dept: LAB | Facility: HOSPITAL | Age: 72
End: 2022-03-25

## 2022-03-25 ENCOUNTER — TELEPHONE (OUTPATIENT)
Dept: OTOLARYNGOLOGY | Facility: CLINIC | Age: 72
End: 2022-03-25

## 2022-03-25 ENCOUNTER — HOSPITAL ENCOUNTER (OUTPATIENT)
Dept: GENERAL RADIOLOGY | Facility: HOSPITAL | Age: 72
Discharge: HOME | End: 2022-03-25

## 2022-03-25 ENCOUNTER — PRE-ADMISSION TESTING (OUTPATIENT)
Dept: PREADMISSION TESTING | Facility: HOSPITAL | Age: 72
End: 2022-03-25

## 2022-03-25 VITALS
RESPIRATION RATE: 22 BRPM | SYSTOLIC BLOOD PRESSURE: 159 MMHG | DIASTOLIC BLOOD PRESSURE: 69 MMHG | OXYGEN SATURATION: 98 % | HEIGHT: 67 IN | HEART RATE: 107 BPM | BODY MASS INDEX: 25.95 KG/M2 | WEIGHT: 165.34 LBS

## 2022-03-25 DIAGNOSIS — R59.0 LEFT CERVICAL LYMPHADENOPATHY: ICD-10-CM

## 2022-03-25 LAB
BASOPHILS # BLD AUTO: 0.03 10*3/MM3 (ref 0–0.2)
BASOPHILS NFR BLD AUTO: 0.4 % (ref 0–1.5)
DEPRECATED RDW RBC AUTO: 54.7 FL (ref 37–54)
EOSINOPHIL # BLD AUTO: 0.12 10*3/MM3 (ref 0–0.4)
EOSINOPHIL NFR BLD AUTO: 1.4 % (ref 0.3–6.2)
ERYTHROCYTE [DISTWIDTH] IN BLOOD BY AUTOMATED COUNT: 16.4 % (ref 12.3–15.4)
HCT VFR BLD AUTO: 32.2 % (ref 37.5–51)
HGB BLD-MCNC: 10.2 G/DL (ref 13–17.7)
IMM GRANULOCYTES # BLD AUTO: 0.04 10*3/MM3 (ref 0–0.05)
IMM GRANULOCYTES NFR BLD AUTO: 0.5 % (ref 0–0.5)
LYMPHOCYTES # BLD AUTO: 1.11 10*3/MM3 (ref 0.7–3.1)
LYMPHOCYTES NFR BLD AUTO: 13.3 % (ref 19.6–45.3)
MCH RBC QN AUTO: 29.1 PG (ref 26.6–33)
MCHC RBC AUTO-ENTMCNC: 31.7 G/DL (ref 31.5–35.7)
MCV RBC AUTO: 92 FL (ref 79–97)
MONOCYTES # BLD AUTO: 0.9 10*3/MM3 (ref 0.1–0.9)
MONOCYTES NFR BLD AUTO: 10.8 % (ref 5–12)
NEUTROPHILS NFR BLD AUTO: 6.12 10*3/MM3 (ref 1.7–7)
NEUTROPHILS NFR BLD AUTO: 73.6 % (ref 42.7–76)
NRBC BLD AUTO-RTO: 0 /100 WBC (ref 0–0.2)
PLATELET # BLD AUTO: 297 10*3/MM3 (ref 140–450)
PMV BLD AUTO: 9.6 FL (ref 6–12)
RBC # BLD AUTO: 3.5 10*6/MM3 (ref 4.14–5.8)
SARS-COV-2 ORF1AB RESP QL NAA+PROBE: NOT DETECTED
WBC NRBC COR # BLD: 8.32 10*3/MM3 (ref 3.4–10.8)

## 2022-03-25 PROCEDURE — 93005 ELECTROCARDIOGRAM TRACING: CPT

## 2022-03-25 PROCEDURE — U0004 COV-19 TEST NON-CDC HGH THRU: HCPCS

## 2022-03-25 PROCEDURE — 85025 COMPLETE CBC W/AUTO DIFF WBC: CPT

## 2022-03-25 PROCEDURE — 36415 COLL VENOUS BLD VENIPUNCTURE: CPT

## 2022-03-25 PROCEDURE — 71046 X-RAY EXAM CHEST 2 VIEWS: CPT

## 2022-03-25 PROCEDURE — 93010 ELECTROCARDIOGRAM REPORT: CPT | Performed by: INTERNAL MEDICINE

## 2022-03-25 PROCEDURE — C9803 HOPD COVID-19 SPEC COLLECT: HCPCS

## 2022-03-25 RX ORDER — MULTIPLE VITAMINS W/ MINERALS TAB 9MG-400MCG
1 TAB ORAL DAILY
COMMUNITY

## 2022-03-25 RX ORDER — ASPIRIN 81 MG/1
81 TABLET ORAL DAILY
COMMUNITY

## 2022-03-25 NOTE — DISCHARGE INSTRUCTIONS
Before you come to the hospital      Do not eat, drink, smoke, or chew gum after midnight the night before surgery. This includes no mints.    Arrival time: AS DIRECTED BY OFFICE     Only one family member or friend will be allowed per patient      YOU MAY TAKE THE FOLLOWING MEDICATION(S) THE MORNING OF SURGERY WITH A SIP OF WATER:   Hold Benazepril 24 hours prior to surgery         ALL OTHER HOME MEDICATION CHECK WITH YOUR PHYSICIAN                            (especially if you are taking diabetes medicines or blood thinners)     Do not take any Erectile Dysfunction medications (EX: CIALIS, VIAGRA) 24 hours prior to surgery      If you were given and instructed to use a germ- killing soap, use as directed the night before surgery and the morning of surgery before coming to the hospital.                 MANAGING PAIN AFTER SURGERY    We know you are probably wondering what your pain will be like after surgery.  Following surgery it is unrealistic to expect you will not have pain.   Pain is how our bodies let us know that something is wrong or cautions us to be careful.  That said, our goal is to make your pain tolerable.    Methods we may use to treat your pain include (oral or IV medications, PCAs, epidurals, nerve blocks, etc.)   While some procedures require IV pain medications for a short time after surgery, transitioning to pain medications by mouth allows for better management of pain.   Your nurse will encourage you to take oral pain medications whenever possible.  IV medications work almost immediately, but only last a short while.  Taking medications by mouth allows for a more constant level of medication in your blood stream for a longer period of time.      Once your pain is out of control it is harder to get back under control.  It is important you are aware when your next dose of pain medication is due.  If you are admitted, your nurse may write the time of your next dose on the white board in your room  to help you remember.      We are interested in your pain and encourage you to inform us about aggravating factors during your visit.   Many times a simple repositioning every few hours can make a big difference.    If your physician says it is okay, do not let your pain prevent you from getting out of bed. Be sure to call your nurse for assistance prior to getting up so you do not fall.      Before surgery, please decide your tolerable pain goal.  These faces help describe the pain ratings we use on a 0-10 scale.   Be prepared to tell us your goal and whether or not you take pain or anxiety medications at home.

## 2022-03-25 NOTE — TELEPHONE ENCOUNTER
Patient is on aspirin. He states he has been on aspirin for 30 years as a precaution. We will have him stop prior to surgery Monday.

## 2022-03-28 ENCOUNTER — HOSPITAL ENCOUNTER (OUTPATIENT)
Facility: HOSPITAL | Age: 72
Setting detail: HOSPITAL OUTPATIENT SURGERY
Discharge: HOME | End: 2022-03-28
Attending: OTOLARYNGOLOGY | Admitting: OTOLARYNGOLOGY

## 2022-03-28 ENCOUNTER — ANESTHESIA EVENT (OUTPATIENT)
Dept: PERIOP | Facility: HOSPITAL | Age: 72
End: 2022-03-28

## 2022-03-28 ENCOUNTER — ANESTHESIA (OUTPATIENT)
Dept: PERIOP | Facility: HOSPITAL | Age: 72
End: 2022-03-28

## 2022-03-28 VITALS
RESPIRATION RATE: 16 BRPM | SYSTOLIC BLOOD PRESSURE: 131 MMHG | TEMPERATURE: 98.8 F | DIASTOLIC BLOOD PRESSURE: 68 MMHG | OXYGEN SATURATION: 96 % | HEART RATE: 90 BPM

## 2022-03-28 DIAGNOSIS — R59.0 LEFT CERVICAL LYMPHADENOPATHY: Primary | ICD-10-CM

## 2022-03-28 LAB
QT INTERVAL: 350 MS
QTC INTERVAL: 456 MS

## 2022-03-28 PROCEDURE — 88341 IMHCHEM/IMCYTCHM EA ADD ANTB: CPT | Performed by: OTOLARYNGOLOGY

## 2022-03-28 PROCEDURE — 25010000002 PROPOFOL 10 MG/ML EMULSION: Performed by: NURSE ANESTHETIST, CERTIFIED REGISTERED

## 2022-03-28 PROCEDURE — 88305 TISSUE EXAM BY PATHOLOGIST: CPT | Performed by: OTOLARYNGOLOGY

## 2022-03-28 PROCEDURE — 38510 BIOPSY/REMOVAL LYMPH NODES: CPT | Performed by: OTOLARYNGOLOGY

## 2022-03-28 PROCEDURE — 25010000002 FENTANYL CITRATE (PF) 100 MCG/2ML SOLUTION: Performed by: NURSE ANESTHETIST, CERTIFIED REGISTERED

## 2022-03-28 PROCEDURE — 88342 IMHCHEM/IMCYTCHM 1ST ANTB: CPT | Performed by: OTOLARYNGOLOGY

## 2022-03-28 RX ORDER — NALOXONE HCL 0.4 MG/ML
0.4 VIAL (ML) INJECTION AS NEEDED
Status: DISCONTINUED | OUTPATIENT
Start: 2022-03-28 | End: 2022-03-28 | Stop reason: HOSPADM

## 2022-03-28 RX ORDER — PHENYLEPHRINE HCL IN 0.9% NACL 1 MG/10 ML
SYRINGE (ML) INTRAVENOUS AS NEEDED
Status: DISCONTINUED | OUTPATIENT
Start: 2022-03-28 | End: 2022-03-28 | Stop reason: SURG

## 2022-03-28 RX ORDER — MAGNESIUM HYDROXIDE 1200 MG/15ML
LIQUID ORAL AS NEEDED
Status: DISCONTINUED | OUTPATIENT
Start: 2022-03-28 | End: 2022-03-28 | Stop reason: HOSPADM

## 2022-03-28 RX ORDER — IBUPROFEN 600 MG/1
600 TABLET ORAL ONCE AS NEEDED
Status: DISCONTINUED | OUTPATIENT
Start: 2022-03-28 | End: 2022-03-28 | Stop reason: HOSPADM

## 2022-03-28 RX ORDER — ONDANSETRON 2 MG/ML
4 INJECTION INTRAMUSCULAR; INTRAVENOUS ONCE AS NEEDED
Status: DISCONTINUED | OUTPATIENT
Start: 2022-03-28 | End: 2022-03-28 | Stop reason: HOSPADM

## 2022-03-28 RX ORDER — FLUMAZENIL 0.1 MG/ML
0.2 INJECTION INTRAVENOUS AS NEEDED
Status: DISCONTINUED | OUTPATIENT
Start: 2022-03-28 | End: 2022-03-28 | Stop reason: HOSPADM

## 2022-03-28 RX ORDER — FENTANYL CITRATE 50 UG/ML
INJECTION, SOLUTION INTRAMUSCULAR; INTRAVENOUS AS NEEDED
Status: DISCONTINUED | OUTPATIENT
Start: 2022-03-28 | End: 2022-03-28 | Stop reason: SURG

## 2022-03-28 RX ORDER — LIDOCAINE HYDROCHLORIDE AND EPINEPHRINE 10; 10 MG/ML; UG/ML
INJECTION, SOLUTION INFILTRATION; PERINEURAL AS NEEDED
Status: DISCONTINUED | OUTPATIENT
Start: 2022-03-28 | End: 2022-03-28 | Stop reason: HOSPADM

## 2022-03-28 RX ORDER — SODIUM CHLORIDE 0.9 % (FLUSH) 0.9 %
3 SYRINGE (ML) INJECTION AS NEEDED
Status: DISCONTINUED | OUTPATIENT
Start: 2022-03-28 | End: 2022-03-28 | Stop reason: HOSPADM

## 2022-03-28 RX ORDER — PROPOFOL 10 MG/ML
VIAL (ML) INTRAVENOUS AS NEEDED
Status: DISCONTINUED | OUTPATIENT
Start: 2022-03-28 | End: 2022-03-28 | Stop reason: SURG

## 2022-03-28 RX ORDER — HYDROCODONE BITARTRATE AND ACETAMINOPHEN 5; 325 MG/1; MG/1
1 TABLET ORAL ONCE AS NEEDED
Status: DISCONTINUED | OUTPATIENT
Start: 2022-03-28 | End: 2022-03-28 | Stop reason: HOSPADM

## 2022-03-28 RX ORDER — ROCURONIUM BROMIDE 10 MG/ML
INJECTION, SOLUTION INTRAVENOUS AS NEEDED
Status: DISCONTINUED | OUTPATIENT
Start: 2022-03-28 | End: 2022-03-28 | Stop reason: SURG

## 2022-03-28 RX ORDER — DROPERIDOL 2.5 MG/ML
0.62 INJECTION, SOLUTION INTRAMUSCULAR; INTRAVENOUS ONCE AS NEEDED
Status: DISCONTINUED | OUTPATIENT
Start: 2022-03-28 | End: 2022-03-28 | Stop reason: HOSPADM

## 2022-03-28 RX ORDER — FENTANYL CITRATE 50 UG/ML
25 INJECTION, SOLUTION INTRAMUSCULAR; INTRAVENOUS
Status: DISCONTINUED | OUTPATIENT
Start: 2022-03-28 | End: 2022-03-28 | Stop reason: HOSPADM

## 2022-03-28 RX ORDER — HYDROCODONE BITARTRATE AND ACETAMINOPHEN 5; 325 MG/1; MG/1
1 TABLET ORAL EVERY 4 HOURS PRN
Qty: 8 TABLET | Refills: 0 | Status: SHIPPED | OUTPATIENT
Start: 2022-03-28

## 2022-03-28 RX ORDER — SODIUM CHLORIDE, SODIUM LACTATE, POTASSIUM CHLORIDE, CALCIUM CHLORIDE 600; 310; 30; 20 MG/100ML; MG/100ML; MG/100ML; MG/100ML
1000 INJECTION, SOLUTION INTRAVENOUS CONTINUOUS
Status: DISCONTINUED | OUTPATIENT
Start: 2022-03-28 | End: 2022-03-28 | Stop reason: HOSPADM

## 2022-03-28 RX ORDER — OXYCODONE AND ACETAMINOPHEN 7.5; 325 MG/1; MG/1
2 TABLET ORAL EVERY 4 HOURS PRN
Status: DISCONTINUED | OUTPATIENT
Start: 2022-03-28 | End: 2022-03-28 | Stop reason: HOSPADM

## 2022-03-28 RX ORDER — SODIUM CHLORIDE 0.9 % (FLUSH) 0.9 %
3-10 SYRINGE (ML) INJECTION AS NEEDED
Status: DISCONTINUED | OUTPATIENT
Start: 2022-03-28 | End: 2022-03-28 | Stop reason: HOSPADM

## 2022-03-28 RX ORDER — SODIUM CHLORIDE, SODIUM LACTATE, POTASSIUM CHLORIDE, CALCIUM CHLORIDE 600; 310; 30; 20 MG/100ML; MG/100ML; MG/100ML; MG/100ML
100 INJECTION, SOLUTION INTRAVENOUS CONTINUOUS
Status: DISCONTINUED | OUTPATIENT
Start: 2022-03-28 | End: 2022-03-28 | Stop reason: HOSPADM

## 2022-03-28 RX ORDER — ACETAMINOPHEN 500 MG
1000 TABLET ORAL ONCE
Status: COMPLETED | OUTPATIENT
Start: 2022-03-28 | End: 2022-03-28

## 2022-03-28 RX ORDER — SODIUM CHLORIDE 0.9 % (FLUSH) 0.9 %
3 SYRINGE (ML) INJECTION EVERY 12 HOURS SCHEDULED
Status: DISCONTINUED | OUTPATIENT
Start: 2022-03-28 | End: 2022-03-28 | Stop reason: HOSPADM

## 2022-03-28 RX ORDER — OXYCODONE AND ACETAMINOPHEN 10; 325 MG/1; MG/1
1 TABLET ORAL ONCE AS NEEDED
Status: DISCONTINUED | OUTPATIENT
Start: 2022-03-28 | End: 2022-03-28 | Stop reason: HOSPADM

## 2022-03-28 RX ORDER — ACETAMINOPHEN 500 MG
1000 TABLET ORAL EVERY 6 HOURS PRN
COMMUNITY

## 2022-03-28 RX ORDER — LIDOCAINE HYDROCHLORIDE 10 MG/ML
0.5 INJECTION, SOLUTION EPIDURAL; INFILTRATION; INTRACAUDAL; PERINEURAL ONCE AS NEEDED
Status: DISCONTINUED | OUTPATIENT
Start: 2022-03-28 | End: 2022-03-28 | Stop reason: HOSPADM

## 2022-03-28 RX ORDER — ONDANSETRON 4 MG/1
4 TABLET, FILM COATED ORAL ONCE AS NEEDED
Status: DISCONTINUED | OUTPATIENT
Start: 2022-03-28 | End: 2022-03-28 | Stop reason: HOSPADM

## 2022-03-28 RX ORDER — LABETALOL HYDROCHLORIDE 5 MG/ML
5 INJECTION, SOLUTION INTRAVENOUS
Status: DISCONTINUED | OUTPATIENT
Start: 2022-03-28 | End: 2022-03-28 | Stop reason: HOSPADM

## 2022-03-28 RX ORDER — MIDAZOLAM HYDROCHLORIDE 1 MG/ML
0.5 INJECTION INTRAMUSCULAR; INTRAVENOUS
Status: DISCONTINUED | OUTPATIENT
Start: 2022-03-28 | End: 2022-03-28 | Stop reason: HOSPADM

## 2022-03-28 RX ADMIN — Medication 100 MCG: at 09:35

## 2022-03-28 RX ADMIN — SODIUM CHLORIDE, POTASSIUM CHLORIDE, SODIUM LACTATE AND CALCIUM CHLORIDE: 600; 310; 30; 20 INJECTION, SOLUTION INTRAVENOUS at 09:40

## 2022-03-28 RX ADMIN — Medication 200 MCG: at 09:38

## 2022-03-28 RX ADMIN — Medication 100 MCG: at 09:14

## 2022-03-28 RX ADMIN — SODIUM CHLORIDE, POTASSIUM CHLORIDE, SODIUM LACTATE AND CALCIUM CHLORIDE 1000 ML: 600; 310; 30; 20 INJECTION, SOLUTION INTRAVENOUS at 07:31

## 2022-03-28 RX ADMIN — ROCURONIUM BROMIDE 30 MG: 10 SOLUTION INTRAVENOUS at 08:55

## 2022-03-28 RX ADMIN — FENTANYL CITRATE 50 MCG: 50 INJECTION, SOLUTION INTRAMUSCULAR; INTRAVENOUS at 09:01

## 2022-03-28 RX ADMIN — Medication 150 MCG: at 09:23

## 2022-03-28 RX ADMIN — FENTANYL CITRATE 50 MCG: 50 INJECTION, SOLUTION INTRAMUSCULAR; INTRAVENOUS at 08:52

## 2022-03-28 RX ADMIN — ACETAMINOPHEN 1000 MG: 500 TABLET, FILM COATED ORAL at 08:19

## 2022-03-28 RX ADMIN — PROPOFOL 150 MG: 10 INJECTION, EMULSION INTRAVENOUS at 08:55

## 2022-03-28 NOTE — ANESTHESIA PROCEDURE NOTES
Airway  Urgency: elective    Date/Time: 3/28/2022 8:58 AM  Airway not difficult    General Information and Staff    Patient location during procedure: OR  CRNA: Lan Pina CRNA    Indications and Patient Condition  Indications for airway management: airway protection    Preoxygenated: yes  MILS maintained throughout  Mask difficulty assessment: 1 - vent by mask    Final Airway Details  Final airway type: endotracheal airway      Successful airway: ETT  Cuffed: yes   Successful intubation technique: direct laryngoscopy  Endotracheal tube insertion site: oral  Blade: Hearn  Blade size: 2  ETT size (mm): 7.5  Cormack-Lehane Classification: grade IIa - partial view of glottis  Placement verified by: chest auscultation and capnometry   Cuff volume (mL): 5  Measured from: gums  ETT/EBT to gums (cm): 23  Number of attempts at approach: 1  Assessment: lips, teeth, and gum same as pre-op and atraumatic intubation

## 2022-03-28 NOTE — ANESTHESIA POSTPROCEDURE EVALUATION
Patient: Yamil Hays    Procedure Summary     Date: 03/28/22 Room / Location:  [facility] OR 02 /  [facility] OR    Anesthesia Start: 0852 Anesthesia Stop: 0952    Procedure: Excisional biopsy left cervical lymph node (Left Neck) Diagnosis:       Left cervical lymphadenopathy      (Left cervical lymphadenopathy [R59.0])    Surgeons: Alirio Yarbrough MD Provider: Lan Pina CRNA    Anesthesia Type: general ASA Status: 3          Anesthesia Type: general    Vitals  Vitals Value Taken Time   /62 03/28/22 1003   Temp 98.8 °F (37.1 °C) 03/28/22 1000   Pulse 99 03/28/22 1003   Resp 16 03/28/22 1000   SpO2 92 % 03/28/22 1003   Vitals shown include unvalidated device data.        Post Anesthesia Care and Evaluation    Patient location during evaluation: PACU  Patient participation: complete - patient participated  Level of consciousness: awake and alert  Pain management: adequate  Airway patency: patent  Anesthetic complications: No anesthetic complications  PONV Status: none  Cardiovascular status: acceptable and hemodynamically stable  Respiratory status: acceptable  Hydration status: acceptable    Comments: Blood pressure 131/68, pulse 90, temperature 98.8 °F (37.1 °C), resp. rate 16, SpO2 96 %.    Patient discharged from PACU based upon Eliseo score. Please see RN notes for further details

## 2022-03-28 NOTE — ANESTHESIA PREPROCEDURE EVALUATION
" Anesthesia Evaluation     Patient summary reviewed   NPO Solid Status: > 6 hours  NPO Liquid Status: > 4 hours           Airway   Mallampati: II  TM distance: >3 FB  Dental    (+) poor dentition        Pulmonary    (+) a smoker Former, shortness of breath,   Cardiovascular   Exercise tolerance: good (4-7 METS)    (+) hypertension,   (-) pacemaker, past MI, CAD, cardiac stents      Neuro/Psych  (-) seizures, CVA  GI/Hepatic/Renal/Endo    (+)  GERD,    (-) no renal disease, diabetes    Musculoskeletal     Abdominal    Substance History      OB/GYN          Other   arthritis,    history of cancer                  Anesthesia Plan    ASA 3     general   (Discussed risk of teeth damage--accepts risk; nonspecific cough----differential is wide--including PE given hypercoagulable state, URI...but vitals good, pt without distress and wants to proceed--discussed importance of further workup (as he said, lymphoma \"trumps\" his other care at this time). Appropriate to proceed. EKG reviewed. )  intravenous induction     Anesthetic plan, all risks, benefits, and alternatives have been provided, discussed and informed consent has been obtained with: patient.        CODE STATUS:       "

## 2022-03-29 LAB — BETA-2 MICROGLOBULIN: 2.8 MG/L

## 2022-03-31 NOTE — PROGRESS NOTES
MEDICAL ONCOLOGY PROGRESS NOTE    Pt Name: Mak Arriaga  MRN: [id number]  YOB: 1950  Date of evaluation: 4/4/2022  History Obtained From:  patient, electronic medical record    HISTORY OF PRESENT ILLNESS:  The patient was seen by ENT and underwent excisional biopsy of a neck lymph node. Results of pathology are not available yet. I called and discussed with pathologist, Dr. Naren Marrero. She is concerned that these represent a high-grade lymphoma. Further studies are ongoing at [facility] oncology. Final results likely expected for the end of the week. Preliminary diagnosis is lymphoma. Discussed the patient results of prior blood work. The patient continued to have symptoms of night sweats, fatigue. Diagnosis  · Diffuse large B-cell lymphoma, March 2022  · c-Myc, BCL6 and BCL2 rearrangement pending  · Stage IIIB    Treatment summary  · R-CHOP x 6 cycles    Hematology history  Erroll Snellen was first seen by me on 3/24/2022. The patient was referred by his primary care provider for findings of generalized lymphadenopathy. The patient has had symptoms of weight loss, night sweats and low-grade fever. This seems has been going on for many months now. Patient had Covid 19 infection last year. He had CT scans that showed generalized adenopathy. · 3/22/2022-CT soft tissue neck showed findings of cervical adenopathy identified, including a right level 3 node measuring 1.6 cm in greatest axial diameter (series 4-image 95). There is a left level 5 lymph node measuring 1.7 cm on axial image 102. Multiple enlarged left level 4 nodes, including a 2.3 cm node on axial image 114. Adenopathy extends down into the left supraclavicular fossa and there is bulky adenopathy also appreciated in the upper mediastinum. · 3/22/2022-CT chest with contrast showed findings of mediastinal adenopathy. Subcarinal lymph nodes are present. Right hilar lymph nodes are present.  Left para-aortic adenopathy is present in the abdomen. Concern for lymphoma. · 3/22/2002-CT abdomen/pelvis showed spleen is enlarged measuring 14 cm craniocaudal dimension. Bilateral renal cysts including dominant 5.4 cm RIGHT upper pole exophytic renal cyst. 3 mm nonobstructing LEFT lower pole renal calculus. Prostate is mildly enlarged measuring 4.8 cm transverse dimension. Bulky retroperitoneal lymphadenopathy is noted. Noted conglomerate in the LEFT periaortic region on image 34 series 4 measures 6.0 x 3.8 cm. Enlarged gastrohepatic ligament lymph nodes with reference node on image 23 measuring 14 mm short axis dimension. No enlarged pelvic or inguinal lymph nodes identified. Bilateral chronic L5 pars defects with grade 1 anterolisthesis of L5 on S1. No acute osseous finding. · 3/24/2022- (H), hemoglobin 10.1/MCV 93, platelets 948,318, WBC 8.7  · 3/24/2022-she was first seen by me. Recommended excisional biopsy left supraclavicular lymph node. · 3/24/2022  B2M 2.8, Uric Acid 3.6,   · 3/28/2022 Left Cervical Lymphadenopathy (BHP): The dominant nodes are seen in the left level 4  and left level 5 neck as well as in the left supraclavicular fossa and upper mediastinum. Degenerative spinal stenosis. Atheromatous calcification in the carotid bulbs. · 3/28/2022-excisional anibal biopsy. Final pathology pending. Preliminary results suggestive of high-grade lymphoma. Recommend allopurinol 300 mg p.o. daily. Recommend a PET scan and bone marrow biopsy. · 4/4/2022-IHC studies from excisional node biopsy consistent with diffuse large B-cell lymphoma, ABC phenotype. BCL2, BCL6 and c-Myc rearrangement in process. Recommended R-CHOP x6 cycles. Started on allopurinol. Past Medical History:    Past Medical History:   Diagnosis Date    Chronic neck pain     Herniated disc, cervical     HTN (hypertension)        Past Surgical History:    No past surgical history on file.     Social History:    Marital status:  Smoking status:Former smoker, Quit more 25 years ago  ETOH status:No   Resides:[place]    Family History:   Family History   Problem Relation Age of Onset    Cancer Mother         lymphoma    Heart Disease Father        Current Hospital Medications:    Current Outpatient Medications   Medication Sig Dispense Refill    allopurinol (ZYLOPRIM) 300 MG tablet Take 1 tablet by mouth daily 30 tablet 5    promethazine-dextromethorphan (PROMETHAZINE-DM) 6.25-15 MG/5ML syrup 1-2 tsp every hs  prn cough 120 mL 0    turmeric (QC TUMERIC COMPLEX) 500 MG CAPS Take by mouth daily      zinc gluconate 50 MG tablet Take 50 mg by mouth daily      ferrous sulfate (IRON 325) 325 (65 Fe) MG tablet Take 325 mg by mouth daily (with breakfast)      KRILL OIL PO Take by mouth      Red Yeast Rice Extract (RED YEAST RICE PO) Take by mouth      Multiple Vitamins-Minerals (PRESERVISION AREDS) TABS Take by mouth      Probiotic Product (PROBIOTIC-10 PO) Take by mouth      Zn-Pyg Afri-Nettle-Saw Palmet (SAW PALMETTO COMPLEX PO) Take by mouth      amLODIPine (NORVASC) 5 MG tablet TAKE 1 TABLET NIGHTLY 90 tablet 3    benazepril (LOTENSIN) 20 MG tablet TAKE 1 TABLET EVERY DAY 90 tablet 3    Coenzyme Q10 (CO Q 10 PO) Take by mouth      Multiple Vitamins-Minerals (THERAPEUTIC MULTIVITAMIN-MINERALS) tablet Take 1 tablet by mouth daily      Vitamin D (CHOLECALCIFEROL) 1000 UNITS CAPS capsule Take 1,000 Units by mouth daily       niacin 500 MG CR capsule Take 500 mg by mouth nightly       aspirin 81 MG tablet Take 81 mg by mouth daily       No current facility-administered medications for this visit.        Allergies: No Known Allergies      Subjective   REVIEW OF SYSTEMS:   CONSTITUTIONAL: no fever, no night sweats, weakness, fatigue;  HEENT: no blurring of vision, no double vision, no hearing difficulty, no tinnitus, no ulceration, no dysplasia, no epistaxis;   LUNGS: no cough, no hemoptysis, no wheeze,  no shortness of breath;  CARDIOVASCULAR: no palpitation, no chest pain, no shortness of breath;  GI: no abdominal pain, no nausea, no vomiting, no diarrhea, no constipation;  KARINA: no dysuria, no hematuria, no frequency or urgency, no nephrolithiasis;  MUSCULOSKELETAL: no joint pain, no swelling, no stiffness;  ENDOCRINE: no polyuria, no polydipsia, no cold or heat intolerance;  HEMATOLOGY: no easy bruising or bleeding, no history of clotting disorder;  DERMATOLOGY: no skin rash, no eczema, no pruritus;  PSYCHIATRY: no depression, no anxiety, no panic attacks, no suicidal ideation, no homicidal ideation;  NEUROLOGY: no syncope, no seizures, no numbness or tingling of hands, no numbness or tingling of feet, no paresis;       Objective   BP (!) 140/78   Pulse 103   Ht 5' 7\" (1.702 m)   Wt 159 lb 9.6 oz (72.4 kg)   SpO2 95%   BMI 25.00 kg/m²     PHYSICAL EXAM:  CONSTITUTIONAL: Alert, appropriate, no acute distress  EYES: Non icteric, EOM intact, pupils equal round   ENT: Mucus membranes moist, no oral pharyngeal lesions, external inspection of ears and nose are normal.  NECK: Supple, no masses. No palpable thyroid mass  CHEST/LUNGS: CTA bilaterally, normal respiratory effort   CARDIOVASCULAR: Tachycardic, no murmurs. No lower extremity edema  ABDOMEN: soft non-tender, active bowel sounds, no HSM. No palpable masses  EXTREMITIES: warm, full ROM in all 4 extremities, no focal weakness. SKIN: warm, dry with no rashes or lesions  LYMPH: No cervical, clavicular, axillary, or inguinal lymphadenopathy  NEUROLOGIC: follows commands, non focal   PSYCH: mood and affect appropriate.   Alert and oriented to time, place, person        LABORATORY RESULTS REVIEWED/ANALYZED BY ME:  3/24/2022  B2M 2.8, Uric Acid 3.6,      Lab Results   Component Value Date    WBC 6.80 04/04/2022    HGB 11.6 (L) 04/04/2022    HCT 35.7 (L) 04/04/2022    MCV 93.7 (H) 04/04/2022     (H) 04/04/2022     Lab Results   Component Value Date     03/24/2022    K 4.1 03/24/2022  03/24/2022    CO2 26 03/24/2022    BUN 20 03/24/2022    CREATININE 0.6 03/24/2022    GLUCOSE 96 03/24/2022    CALCIUM 8.2 (L) 03/24/2022    PROT 7.6 03/24/2022    LABALBU 3.5 03/24/2022    BILITOT 0.5 03/24/2022    ALKPHOS 92 03/24/2022    AST 51 03/24/2022    ALT 39 03/24/2022    LABGLOM >60 03/24/2022    GFRAA >59 02/24/2022    AGRATIO 1.5 09/19/2016    GLOB 3.0 09/19/2016           RADIOLOGY STUDIES REPORT/REVIEWED AND INTERPRETED BY ME:  3/28/2022 Left Cervical Lymphadenopathy (BHP):History of lymphoma with multifocal adenopathy, extending up to the right level 3 neck. The dominant nodes are seen in the left level 4  and left level 5 neck as well as in the left supraclavicular fossa and upper mediastinum. Degenerative spinal stenosis. Atheromatous calcification in the carotid bulbs. ASSESSMENT:  #Diffuse large B-cell lymphoma, stage IIIB, ABC phenotype (c-Myc, BCL-2, BCL6 rearrangement pending)  -3/28/2022-excisional node biopsy by Dr. Neetu Thorpe, [facility].  -Path consistent with ABC phenotype DLBCL: Lymphoma as per Dr. Almas Chester. Awaiting final IHC studies from [facility]. -PET scan and bone marrow biopsy-ordered today on 4/4/2022.  -Persistent B symptoms.  -Ordered PET scan/bone marrow biopsy on 4/4/2022. Recommended:  -R-CHOP x6 cycles  G-CSF support    Pulmonary interstitial fibrosis-I reviewed the CT scan with the patient. He has complaints of short of breath on exertion. He has findings of interstitial fibrosis. I offered a pulmonology consultation but he wants to defer this at this time.     PLAN:  · RTC with MD in 7 days after BMAB days   · Follow-up with [facility] pathology results  · Recommend Allopurinol 300mg daily-script sent  · Recommended PET Scan ASAP  · Recommended Bone Marrow biopsy ASAP this week  · Consider Pulmonology referral  · R-CHOP x6 cycles with G-CSF support  · RTC cycle #2        Maciel YANEZ am pre charting  as Medical Assistant for [employer], MD. Electronically signed by Maciel Caruso MA on 4/4/2022 at 9:55 AM CDT. Jarret Cantu am scribing for Yulissa Regalado MD. Electronically signed by Carlos Enrique Carrasco RN on 4/4/2022 at 1:12 PM CDT. I, Dr Priya Boggs, personally performed the services described in this documentation as scribed by Carlos Enrique Carrasco RN in my presence and is both accurate and complete. I have seen, examined and reviewed this patient medication list, appropriate labs and imaging studies. I reviewed relevant medical records and others physicians notes. I discussed the plans of care with the patient. I answered all the questions to the patients satisfaction. I have also reviewed the chief complaint (CC) and part of the history (History of Present Illness (HPI), Past Family Social History [facility]), or Review of Systems (ROS) and made changes when appropriated. (Please note that portions of this note were completed with a voice recognition program. Efforts were made to edit the dictations but occasionally words are mis-transcribed.)    Electronically signed by Yulissa Regalado MD on 4/4/2022 at 3:52 PM      The total time (45min) I spent to see the patient today includes at least one or more of the following: preparing to see the patient by reviewing prior tests, prior notes or other relevant information, performing appropriate independent examination and evaluation, counseling, ordering of medications, tests  communicating with other healthcare professionals when appropriated to coordinate care, documenting clinic information in the electronic medical record or other health records, independently interpreting results of tests, managing test results and communicating the results to the patient/family or caregiver.

## 2022-04-01 ENCOUNTER — TELEPHONE (OUTPATIENT)
Dept: PULMONOLOGY | Age: 72
End: 2022-04-01

## 2022-04-01 NOTE — TELEPHONE ENCOUNTER
Spoke with patient's wife.  She states patient is currently seeing oncology and they will speak to the oncologist on Monday and have him submit a new referral if he wants patient to see pulmonology

## 2022-04-04 ENCOUNTER — TELEPHONE (OUTPATIENT)
Dept: HEMATOLOGY | Age: 72
End: 2022-04-04

## 2022-04-04 ENCOUNTER — HOSPITAL ENCOUNTER (OUTPATIENT)
Dept: INFUSION THERAPY | Age: 72
Discharge: HOME | End: 2022-04-04
Payer: MEDICARE

## 2022-04-04 ENCOUNTER — OFFICE VISIT (OUTPATIENT)
Dept: HEMATOLOGY | Age: 72
End: 2022-04-04
Payer: MEDICARE

## 2022-04-04 VITALS
HEIGHT: 67 IN | HEART RATE: 103 BPM | BODY MASS INDEX: 25.05 KG/M2 | SYSTOLIC BLOOD PRESSURE: 140 MMHG | WEIGHT: 159.6 LBS | DIASTOLIC BLOOD PRESSURE: 78 MMHG | OXYGEN SATURATION: 95 %

## 2022-04-04 DIAGNOSIS — Z71.89 CARE PLAN DISCUSSED WITH PATIENT: ICD-10-CM

## 2022-04-04 DIAGNOSIS — T45.1X5A ANTINEOPLASTIC CHEMOTHERAPY INDUCED ANEMIA: ICD-10-CM

## 2022-04-04 DIAGNOSIS — C83.30 DIFFUSE LARGE B-CELL LYMPHOMA, UNSPECIFIED BODY REGION (HCC): ICD-10-CM

## 2022-04-04 DIAGNOSIS — I87.8 POOR VENOUS ACCESS: Primary | ICD-10-CM

## 2022-04-04 DIAGNOSIS — Z71.89 COORDINATION OF COMPLEX CARE: ICD-10-CM

## 2022-04-04 DIAGNOSIS — C85.98 LYMPHOMA OF LYMPH NODES OF MULTIPLE SITES (HCC): Primary | ICD-10-CM

## 2022-04-04 DIAGNOSIS — Z01.818 ENCOUNTER FOR ECHOCARDIOGRAPHY BEFORE INITIATION OF CHEMOTHERAPY: Primary | ICD-10-CM

## 2022-04-04 DIAGNOSIS — C85.10 LARGE B-CELL LYMPHOMA (HCC): ICD-10-CM

## 2022-04-04 DIAGNOSIS — D64.81 ANTINEOPLASTIC CHEMOTHERAPY INDUCED ANEMIA: ICD-10-CM

## 2022-04-04 DIAGNOSIS — C85.90 LYMPHOMA, UNSPECIFIED BODY REGION, UNSPECIFIED LYMPHOMA TYPE (HCC): ICD-10-CM

## 2022-04-04 LAB
HCT VFR BLD CALC: 35.7 % (ref 40.1–51)
HEMOGLOBIN: 11.6 G/DL (ref 13.7–17.5)
MCH RBC QN AUTO: 30.4 PG (ref 25.7–32.2)
MCHC RBC AUTO-ENTMCNC: 32.5 G/DL (ref 32.3–36.5)
MCV RBC AUTO: 93.7 FL (ref 79–92.2)
PDW BLD-RTO: 16.5 % (ref 11.6–14.4)
PLATELET # BLD: 340 K/UL (ref 163–337)
PMV BLD AUTO: 8.4 FL (ref 7.4–10.4)
RBC # BLD: 3.81 M/UL (ref 4.63–6.08)
WBC # BLD: 6.8 K/UL (ref 4.23–9.07)

## 2022-04-04 PROCEDURE — 99215 OFFICE O/P EST HI 40 MIN: CPT | Performed by: INTERNAL MEDICINE

## 2022-04-04 PROCEDURE — 85027 COMPLETE CBC AUTOMATED: CPT

## 2022-04-04 PROCEDURE — 1123F ACP DISCUSS/DSCN MKR DOCD: CPT | Performed by: INTERNAL MEDICINE

## 2022-04-04 PROCEDURE — G8417 CALC BMI ABV UP PARAM F/U: HCPCS | Performed by: INTERNAL MEDICINE

## 2022-04-04 PROCEDURE — 1036F TOBACCO NON-USER: CPT | Performed by: INTERNAL MEDICINE

## 2022-04-04 PROCEDURE — 3017F COLORECTAL CA SCREEN DOC REV: CPT | Performed by: INTERNAL MEDICINE

## 2022-04-04 PROCEDURE — G8427 DOCREV CUR MEDS BY ELIG CLIN: HCPCS | Performed by: INTERNAL MEDICINE

## 2022-04-04 PROCEDURE — 99213 OFFICE O/P EST LOW 20 MIN: CPT

## 2022-04-04 PROCEDURE — 36415 COLL VENOUS BLD VENIPUNCTURE: CPT

## 2022-04-04 PROCEDURE — 4040F PNEUMOC VAC/ADMIN/RCVD: CPT | Performed by: INTERNAL MEDICINE

## 2022-04-04 RX ORDER — ALLOPURINOL 300 MG/1
300 TABLET ORAL DAILY
Qty: 30 TABLET | Refills: 5 | Status: SHIPPED | OUTPATIENT
Start: 2022-04-04 | End: 2022-08-25

## 2022-04-04 RX ORDER — PREDNISONE 50 MG/1
100 TABLET ORAL DAILY
Qty: 10 TABLET | Refills: 5 | Status: SHIPPED | OUTPATIENT
Start: 2022-04-04 | End: 2022-04-09

## 2022-04-04 NOTE — TELEPHONE ENCOUNTER
Voicemail left for patient to return call to office to obtain chemotherapy appointment on Friday 04/08/2022.

## 2022-04-06 ENCOUNTER — HOSPITAL ENCOUNTER (OUTPATIENT)
Dept: INFUSION THERAPY | Age: 72
Discharge: HOME | End: 2022-04-06
Payer: MEDICARE

## 2022-04-06 ENCOUNTER — OFFICE VISIT (OUTPATIENT)
Dept: OTOLARYNGOLOGY | Facility: CLINIC | Age: 72
End: 2022-04-06

## 2022-04-06 ENCOUNTER — OFFICE VISIT (OUTPATIENT)
Dept: HEMATOLOGY | Age: 72
End: 2022-04-06
Payer: MEDICARE

## 2022-04-06 VITALS
HEART RATE: 118 BPM | DIASTOLIC BLOOD PRESSURE: 84 MMHG | WEIGHT: 156.1 LBS | BODY MASS INDEX: 24.5 KG/M2 | HEIGHT: 67 IN | OXYGEN SATURATION: 97 % | SYSTOLIC BLOOD PRESSURE: 168 MMHG

## 2022-04-06 DIAGNOSIS — C85.90 LYMPHOMA, UNSPECIFIED BODY REGION, UNSPECIFIED LYMPHOMA TYPE: Primary | ICD-10-CM

## 2022-04-06 DIAGNOSIS — C85.98 LYMPHOMA OF LYMPH NODES OF MULTIPLE SITES (HCC): ICD-10-CM

## 2022-04-06 DIAGNOSIS — C83.30 DIFFUSE LARGE B-CELL LYMPHOMA, UNSPECIFIED BODY REGION (HCC): Primary | ICD-10-CM

## 2022-04-06 LAB
BASOPHILS ABSOLUTE: 0.04 K/UL (ref 0.01–0.08)
BASOPHILS RELATIVE PERCENT: 0.4 % (ref 0.1–1.2)
EOSINOPHILS ABSOLUTE: 0.16 K/UL (ref 0.04–0.54)
EOSINOPHILS RELATIVE PERCENT: 1.7 % (ref 0.7–7)
HCT VFR BLD CALC: 33.1 % (ref 40.1–51)
HEMOGLOBIN: 10.5 G/DL (ref 13.7–17.5)
LYMPHOCYTES ABSOLUTE: 1.39 K/UL (ref 1.18–3.74)
LYMPHOCYTES RELATIVE PERCENT: 14.4 % (ref 19.3–53.1)
MCH RBC QN AUTO: 28.6 PG (ref 25.7–32.2)
MCHC RBC AUTO-ENTMCNC: 31.7 G/DL (ref 32.3–36.5)
MCV RBC AUTO: 90.2 FL (ref 79–92.2)
MONOCYTES ABSOLUTE: 0.93 K/UL (ref 0.24–0.82)
MONOCYTES RELATIVE PERCENT: 9.6 % (ref 4.7–12.5)
NEUTROPHILS ABSOLUTE: 7.12 K/UL (ref 1.56–6.13)
NEUTROPHILS RELATIVE PERCENT: 73.9 % (ref 34–71.1)
PDW BLD-RTO: 16.6 % (ref 11.6–14.4)
PLATELET # BLD: 354 K/UL (ref 163–337)
PMV BLD AUTO: 8.8 FL (ref 7.4–10.4)
RBC # BLD: 3.67 M/UL (ref 4.63–6.08)
WBC # BLD: 9.64 K/UL (ref 4.23–9.07)

## 2022-04-06 PROCEDURE — 2500000003 HC RX 250 WO HCPCS: Performed by: NURSE PRACTITIONER

## 2022-04-06 PROCEDURE — 38222 DX BONE MARROW BX & ASPIR: CPT | Performed by: NURSE PRACTITIONER

## 2022-04-06 PROCEDURE — 99024 POSTOP FOLLOW-UP VISIT: CPT | Performed by: OTOLARYNGOLOGY

## 2022-04-06 PROCEDURE — 85025 COMPLETE CBC W/AUTO DIFF WBC: CPT

## 2022-04-06 PROCEDURE — 99212 OFFICE O/P EST SF 10 MIN: CPT

## 2022-04-06 PROCEDURE — 36415 COLL VENOUS BLD VENIPUNCTURE: CPT

## 2022-04-06 RX ORDER — LIDOCAINE HYDROCHLORIDE 20 MG/ML
5 INJECTION, SOLUTION INFILTRATION; PERINEURAL ONCE
Status: COMPLETED | OUTPATIENT
Start: 2022-04-06 | End: 2022-04-06

## 2022-04-06 RX ADMIN — LIDOCAINE HYDROCHLORIDE 5 ML: 20 INJECTION, SOLUTION INFILTRATION; PERINEURAL at 16:14

## 2022-04-06 ASSESSMENT — PAIN SCALES - GENERAL: PAINLEVEL_OUTOF10: 5

## 2022-04-06 NOTE — PROGRESS NOTES
Pt Name: Mikala Mcclain  YOB: 1950  MRN: [id number]    Date of procedure: 4/6/2022    Procedure Note:  Bone Marrow Aspirate and Biopsy    Indication: Diffuse large B-cell lymphoma    Site: Right iliac crest    Informed consent was signed by Mikala Mcclain. Time out was taken. Mikala Mcclain was placed in the left lateral decubitus position. Mikala Mcclain was prepped and draped in sterile fashion. 8 mL of 1% Lidocaine was used for local anesthetic of the skin and right periosteum. A bone marrow aspirate and biopsy was obtained and sent for surgical pathology review, flow cytometry, and chromosome analysis. The total blood loss was less than 3 mL. The skin was cleaned and a sterile bandage was placed on the entrance site. The patient tolerated the procedure without complication. Keep scheduled appointment in clinic with Dr. Miguel Crisostomo to review results.     MICHELA Anderson    04/06/22  3:44 PM

## 2022-04-06 NOTE — PATIENT INSTRUCTIONS
Patient Education        Bone Marrow Aspiration and Biopsy: What to Expect at Home  Your Recovery  The biopsy site may feel sore for several days. It can help to walk, take pain medicine, and put ice packs on the site. You will probably be able to return to work and your usual activities the day after the procedure. Your doctor ornurse will call you with the results of your test.  This care sheet gives you a general idea about how long it will take for you to recover. But each person recovers at a different pace. Follow the steps belowto get better as quickly as possible. How can you care for yourself at home? Activity     Rest when you feel tired. Getting enough sleep will help you recover.      You may drive when you are no longer taking pain pills and can quickly move your foot from the gas pedal to the brake. You must also be able to sit comfortably for a long period of time, even if you do not plan to go far. You might get caught in traffic.      Most people are able to return to work the day after the procedure. Medicines     Your doctor will tell you if and when you can restart your medicines. He or she will also give you instructions about taking any new medicines.      If you take aspirin or some other blood thinner, ask your doctor if and when to start taking it again. Make sure that you understand exactly what your doctor wants you to do.      Be safe with medicines. Take pain medicines exactly as directed. ? If the doctor gave you a prescription medicine for pain, take it as prescribed. ? If you are not taking a prescription pain medicine, take an over-the-counter medicine such as acetaminophen (Tylenol), ibuprofen (Advil, Motrin), or naproxen (Aleve). Read and follow all instructions on the label. ? Do not take two or more pain medicines at the same time unless the doctor told you to. Many pain medicines have acetaminophen, which is Tylenol.  Too much acetaminophen (Tylenol) can be harmful.      If you think your pain medicine is making you sick to your stomach:  ? Take your medicine after meals (unless your doctor has told you not to). ? Ask your doctor for a different pain medicine.      If your doctor prescribed antibiotics, take them as directed. Do not stop taking them just because you feel better. Ice     Put ice or a cold pack on the biopsy site for 10 to 20 minutes at a time. Put a thin cloth between the ice and your skin. Follow-up care is a key part of your treatment and safety. Be sure to make and go to all appointments, and call your doctor if you are having problems. It's also a good idea to know your test results and keep alist of the medicines you take. When should you call for help? Call 911 anytime you think you may need emergency care. For example, call if:     You passed out (lost consciousness). Call your doctor now or seek immediate medical care if:     You have signs of infection, such as:  ? Increased pain, swelling, warmth, or redness. ? Red streaks leading from the biopsy site. ? Pus draining from the biopsy site. ? Swollen lymph nodes in your neck, armpits, or groin. ? A fever. Watch closely for any changes in your health, and be sure to contact yourdoctor if:     You are not getting better as expected. Where can you learn more? Go to https://directworxpemaria del carmeneb.[employer]. org and sign in to your [employer] account. Enter E148 in the [facility] box to learn more about \"Bone Marrow Aspiration and Biopsy: What to Expect at Home. \"     If you do not have an account, please click on the \"Sign Up Now\" link. Current as of: June 17, 2021               Content Version: 13.2  © 1279-4769 Healthwise, Incorporated. Care instructions adapted under license by [facility] ([facility]).  If you have questions about a medical condition or this instruction, always ask your healthcare professional. [address] any warranty or liability for your use of this information.

## 2022-04-06 NOTE — PROGRESS NOTES
Procedure   Suture Removal    Date/Time: 4/6/2022 10:19 AM  Performed by: Bri Nielson RN  Authorized by: Alirio Yarbrough MD   Consent: Verbal consent obtained.  Consent given by: patient  Patient identity confirmed: verbally with patient  Body area: head/neck  Location details: neck  Comments: Patient presents for suture removal. The incision has mild swelling. The incision is well-approximated with no redness or infection. Path given.

## 2022-04-07 ENCOUNTER — OFFICE VISIT (OUTPATIENT)
Dept: SURGERY | Age: 72
End: 2022-04-07
Payer: MEDICARE

## 2022-04-07 ENCOUNTER — HOSPITAL ENCOUNTER (OUTPATIENT)
Dept: NUCLEAR MEDICINE | Age: 72
Discharge: HOME | End: 2022-04-09
Payer: MEDICARE

## 2022-04-07 ENCOUNTER — PREP FOR PROCEDURE (OUTPATIENT)
Dept: SURGERY | Age: 72
End: 2022-04-07

## 2022-04-07 VITALS
HEIGHT: 67 IN | SYSTOLIC BLOOD PRESSURE: 124 MMHG | TEMPERATURE: 98.8 F | WEIGHT: 155.8 LBS | BODY MASS INDEX: 24.45 KG/M2 | DIASTOLIC BLOOD PRESSURE: 70 MMHG

## 2022-04-07 DIAGNOSIS — C85.98 LYMPHOMA OF LYMPH NODES OF MULTIPLE SITES (HCC): ICD-10-CM

## 2022-04-07 DIAGNOSIS — C85.90 LYMPHOMA, UNSPECIFIED BODY REGION, UNSPECIFIED LYMPHOMA TYPE (HCC): Primary | ICD-10-CM

## 2022-04-07 LAB
GLUCOSE BLD-MCNC: 80 MG/DL (ref 70–99)
PERFORMED ON: NORMAL

## 2022-04-07 PROCEDURE — 3017F COLORECTAL CA SCREEN DOC REV: CPT | Performed by: SURGERY

## 2022-04-07 PROCEDURE — 78815 PET IMAGE W/CT SKULL-THIGH: CPT

## 2022-04-07 PROCEDURE — 99202 OFFICE O/P NEW SF 15 MIN: CPT | Performed by: SURGERY

## 2022-04-07 PROCEDURE — 4040F PNEUMOC VAC/ADMIN/RCVD: CPT | Performed by: SURGERY

## 2022-04-07 PROCEDURE — G8427 DOCREV CUR MEDS BY ELIG CLIN: HCPCS | Performed by: SURGERY

## 2022-04-07 PROCEDURE — 1123F ACP DISCUSS/DSCN MKR DOCD: CPT | Performed by: SURGERY

## 2022-04-07 PROCEDURE — G8420 CALC BMI NORM PARAMETERS: HCPCS | Performed by: SURGERY

## 2022-04-07 PROCEDURE — 3430000000 HC RX DIAGNOSTIC RADIOPHARMACEUTICAL: Performed by: INTERNAL MEDICINE

## 2022-04-07 PROCEDURE — 82947 ASSAY GLUCOSE BLOOD QUANT: CPT

## 2022-04-07 PROCEDURE — 1036F TOBACCO NON-USER: CPT | Performed by: SURGERY

## 2022-04-07 PROCEDURE — A9552 F18 FDG: HCPCS | Performed by: INTERNAL MEDICINE

## 2022-04-07 RX ORDER — SODIUM CHLORIDE 0.9 % (FLUSH) 0.9 %
5-40 SYRINGE (ML) INJECTION PRN
Status: CANCELLED | OUTPATIENT
Start: 2022-04-07

## 2022-04-07 RX ORDER — FLUDEOXYGLUCOSE F 18 200 MCI/ML
15 INJECTION, SOLUTION INTRAVENOUS
Status: COMPLETED | OUTPATIENT
Start: 2022-04-07 | End: 2022-04-07

## 2022-04-07 RX ORDER — SODIUM CHLORIDE 9 MG/ML
25 INJECTION, SOLUTION INTRAVENOUS PRN
Status: CANCELLED | OUTPATIENT
Start: 2022-04-07

## 2022-04-07 RX ORDER — SODIUM CHLORIDE 0.9 % (FLUSH) 0.9 %
5-40 SYRINGE (ML) INJECTION EVERY 12 HOURS SCHEDULED
Status: CANCELLED | OUTPATIENT
Start: 2022-04-07

## 2022-04-07 RX ADMIN — FLUDEOXYGLUCOSE F 18 15 MILLICURIE: 200 INJECTION, SOLUTION INTRAVENOUS at 12:34

## 2022-04-07 ASSESSMENT — ENCOUNTER SYMPTOMS
EYE REDNESS: 0
BACK PAIN: 0
WHEEZING: 0
EYE PAIN: 0
ABDOMINAL DISTENTION: 0
COUGH: 1
SORE THROAT: 0
ABDOMINAL PAIN: 0

## 2022-04-07 NOTE — PROGRESS NOTES
Mr. Miryam Edmonds is a 70year old male with a recent diagnosis of lymphoma. He is now in need of port placement in order to start chemo. They are planning to start chemo tomorrow. He is not on any blood thinners and has never had a port before. Past Medical History:   Diagnosis Date    Cancer [facility])     lymphoma    Chronic neck pain     Herniated disc, cervical     HTN (hypertension)      Past Surgical History:   Procedure Laterality Date    LYMPH NODE BIOPSY      left side of neck     Current Outpatient Medications   Medication Sig Dispense Refill    allopurinol (ZYLOPRIM) 300 MG tablet Take 1 tablet by mouth daily 30 tablet 5    predniSONE (DELTASONE) 50 MG tablet Take 2 tablets by mouth daily for 5 days 10 tablet 5    promethazine-dextromethorphan (PROMETHAZINE-DM) 6.25-15 MG/5ML syrup 1-2 tsp every hs  prn cough 120 mL 0    turmeric (QC TUMERIC COMPLEX) 500 MG CAPS Take by mouth daily      zinc gluconate 50 MG tablet Take 50 mg by mouth daily      ferrous sulfate (IRON 325) 325 (65 Fe) MG tablet Take 325 mg by mouth daily (with breakfast)      KRILL OIL PO Take by mouth      Red Yeast Rice Extract (RED YEAST RICE PO) Take by mouth      Multiple Vitamins-Minerals (PRESERVISION AREDS) TABS Take by mouth      Probiotic Product (PROBIOTIC-10 PO) Take by mouth      Zn-Pyg Afri-Nettle-Saw Palmet (SAW PALMETTO COMPLEX PO) Take by mouth      amLODIPine (NORVASC) 5 MG tablet TAKE 1 TABLET NIGHTLY 90 tablet 3    benazepril (LOTENSIN) 20 MG tablet TAKE 1 TABLET EVERY DAY 90 tablet 3    Coenzyme Q10 (CO Q 10 PO) Take by mouth      Multiple Vitamins-Minerals (THERAPEUTIC MULTIVITAMIN-MINERALS) tablet Take 1 tablet by mouth daily      Vitamin D (CHOLECALCIFEROL) 1000 UNITS CAPS capsule Take 1,000 Units by mouth daily       niacin 500 MG CR capsule Take 500 mg by mouth nightly       aspirin 81 MG tablet Take 81 mg by mouth daily       No current facility-administered medications for this visit. Allergies: Patient has no known allergies. Family History   Problem Relation Age of Onset    Cancer Mother         lymphoma    Heart Disease Father        Social History     Tobacco Use    Smoking status: Former Smoker     Packs/day: 2.00     Years: 15.00     Pack years: 30.00     Types: Cigarettes     Quit date:      Years since quittin.2    Smokeless tobacco: Never Used   Substance Use Topics    Alcohol use: Yes     Comment: Occasional        Review of Systems   Constitutional: Positive for fatigue. Negative for chills. HENT: Negative for congestion and sore throat. Eyes: Negative for pain and redness. Respiratory: Positive for cough. Negative for wheezing. Cardiovascular: Negative for chest pain and palpitations. Gastrointestinal: Negative for abdominal distention and abdominal pain. Endocrine: Negative for polydipsia and polyphagia. Genitourinary: Negative for dysuria and hematuria. Musculoskeletal: Negative for arthralgias and back pain. Neurological: Positive for weakness. Negative for headaches. Psychiatric/Behavioral: Negative for confusion and dysphoric mood. Physical Exam  Vitals reviewed. Constitutional:       General: He is not in acute distress. HENT:      Head: Normocephalic and atraumatic. Mouth/Throat:      Mouth: Mucous membranes are moist.   Eyes:      Pupils: Pupils are equal, round, and reactive to light. Cardiovascular:      Rate and Rhythm: Normal rate and regular rhythm. Pulmonary:      Effort: Pulmonary effort is normal. No respiratory distress. Abdominal:      General: There is no distension. Palpations: Abdomen is soft. Musculoskeletal:         General: No swelling. Normal range of motion. Cervical back: Neck supple. Skin:     General: Skin is warm and dry. Neurological:      General: No focal deficit present. Mental Status: He is alert. Mental status is at baseline.            Assessment and plan:  71 year old male with lymphoma in need of port placement  The risks and benefits of port placement were discussed with the patient, including but not limited to, bleeding, infection, and pneumothorax. The patient expressed understanding and would like to proceed with placement. Will plan placement tomorrow morning so that he can go to chemo from there.     Zayda Magaña MD  4/7/2022  4:35 PM

## 2022-04-07 NOTE — LETTER
Preop Orders:     Patient: Lacy Muñoz  : 1950    Hospital: [facility]    Admitting Physician:  Dr. Cholo Moreno    Diagnosis: lymphoma    Procedure: single lumen port placement with US and fluoro    Time: 1 hour    Anesthesia: MAC    Admission: Outpatient     Date: tomorrow    Labs: per anesthesia     Special Instructions:  none    Cardiac Clearance:  none    Special Equipment:  none    Pre-Op Meds:  ancef    Latex Allergy: no    Beta Blocker: no    Medication Instructions: none    Post op visit: PRN      Electronically signed by Darryl Malcolm MD   On 22   @ 2:16 PM

## 2022-04-07 NOTE — H&P
Mr. Garland Faulkner is a 70year old male with a recent diagnosis of lymphoma. He is now in need of port placement in order to start chemo. They are planning to start chemo tomorrow.  He is not on any blood thinners and has never had a port before.     Past Medical History        Past Medical History:   Diagnosis Date    Cancer ([facility] [address]       lymphoma    Chronic neck pain      Herniated disc, cervical      HTN (hypertension)           Past Surgical History         Past Surgical History:   Procedure Laterality Date    LYMPH NODE BIOPSY         left side of neck         Current Facility-Administered Medications          Current Outpatient Medications   Medication Sig Dispense Refill    allopurinol (ZYLOPRIM) 300 MG tablet Take 1 tablet by mouth daily 30 tablet 5    predniSONE (DELTASONE) 50 MG tablet Take 2 tablets by mouth daily for 5 days 10 tablet 5    promethazine-dextromethorphan (PROMETHAZINE-DM) 6.25-15 MG/5ML syrup 1-2 tsp every hs  prn cough 120 mL 0    turmeric (QC TUMERIC COMPLEX) 500 MG CAPS Take by mouth daily        zinc gluconate 50 MG tablet Take 50 mg by mouth daily        ferrous sulfate (IRON 325) 325 (65 Fe) MG tablet Take 325 mg by mouth daily (with breakfast)        KRILL OIL PO Take by mouth        Red Yeast Rice Extract (RED YEAST RICE PO) Take by mouth        Multiple Vitamins-Minerals (PRESERVISION AREDS) TABS Take by mouth        Probiotic Product (PROBIOTIC-10 PO) Take by mouth        Zn-Pyg Afri-Nettle-Saw Palmet (SAW PALMETTO COMPLEX PO) Take by mouth        amLODIPine (NORVASC) 5 MG tablet TAKE 1 TABLET NIGHTLY 90 tablet 3    benazepril (LOTENSIN) 20 MG tablet TAKE 1 TABLET EVERY DAY 90 tablet 3    Coenzyme Q10 (CO Q 10 PO) Take by mouth        Multiple Vitamins-Minerals (THERAPEUTIC MULTIVITAMIN-MINERALS) tablet Take 1 tablet by mouth daily        Vitamin D (CHOLECALCIFEROL) 1000 UNITS CAPS capsule Take 1,000 Units by mouth daily         niacin 500 MG CR capsule Take 500 mg by mouth nightly         aspirin 81 MG tablet Take 81 mg by mouth daily          No current facility-administered medications for this visit.         Allergies: Patient has no known allergies.     Family History         Family History   Problem Relation Age of Onset    Cancer Mother           lymphoma    Heart Disease Father              Social History            Tobacco Use    Smoking status: Former Smoker       Packs/day: 2.00       Years: 15.00       Pack years: 30.00       Types: Cigarettes       Quit date: 26       Years since quittin.2    Smokeless tobacco: Never Used   Substance Use Topics    Alcohol use: Yes       Comment: Occasional          Review of Systems   Constitutional: Positive for fatigue. Negative for chills. HENT: Negative for congestion and sore throat. Eyes: Negative for pain and redness. Respiratory: Positive for cough. Negative for wheezing. Cardiovascular: Negative for chest pain and palpitations. Gastrointestinal: Negative for abdominal distention and abdominal pain. Endocrine: Negative for polydipsia and polyphagia. Genitourinary: Negative for dysuria and hematuria. Musculoskeletal: Negative for arthralgias and back pain. Neurological: Positive for weakness. Negative for headaches. Psychiatric/Behavioral: Negative for confusion and dysphoric mood.         Physical Exam  Vitals reviewed. Constitutional:       General: He is not in acute distress. HENT:      Head: Normocephalic and atraumatic. Mouth/Throat:      Mouth: Mucous membranes are moist.   Eyes:      Pupils: Pupils are equal, round, and reactive to light. Cardiovascular:      Rate and Rhythm: Normal rate and regular rhythm. Pulmonary:      Effort: Pulmonary effort is normal. No respiratory distress. Abdominal:      General: There is no distension. Palpations: Abdomen is soft. Musculoskeletal:         General: No swelling. Normal range of motion. Cervical back: Neck supple. Skin:     General: Skin is warm and dry. Neurological:      General: No focal deficit present. Mental Status: He is alert. Mental status is at baseline.             Assessment and plan:  70year old male with lymphoma in need of port placement  The risks and benefits of port placement were discussed with the patient, including but not limited to, bleeding, infection, and pneumothorax. The patient expressed understanding and would like to proceed with placement.  Will plan placement tomorrow morning so that he can go to chemo from there.     Sylvie Randolph MD  4/7/2022  4:35 PM

## 2022-04-07 NOTE — H&P (VIEW-ONLY)
Mr. Garland Faulkner is a 70year old male with a recent diagnosis of lymphoma. He is now in need of port placement in order to start chemo. They are planning to start chemo tomorrow.  He is not on any blood thinners and has never had a port before.     Past Medical History        Past Medical History:   Diagnosis Date    Cancer ([facility] [address]       lymphoma    Chronic neck pain      Herniated disc, cervical      HTN (hypertension)           Past Surgical History         Past Surgical History:   Procedure Laterality Date    LYMPH NODE BIOPSY         left side of neck         Current Facility-Administered Medications          Current Outpatient Medications   Medication Sig Dispense Refill    allopurinol (ZYLOPRIM) 300 MG tablet Take 1 tablet by mouth daily 30 tablet 5    predniSONE (DELTASONE) 50 MG tablet Take 2 tablets by mouth daily for 5 days 10 tablet 5    promethazine-dextromethorphan (PROMETHAZINE-DM) 6.25-15 MG/5ML syrup 1-2 tsp every hs  prn cough 120 mL 0    turmeric (QC TUMERIC COMPLEX) 500 MG CAPS Take by mouth daily        zinc gluconate 50 MG tablet Take 50 mg by mouth daily        ferrous sulfate (IRON 325) 325 (65 Fe) MG tablet Take 325 mg by mouth daily (with breakfast)        KRILL OIL PO Take by mouth        Red Yeast Rice Extract (RED YEAST RICE PO) Take by mouth        Multiple Vitamins-Minerals (PRESERVISION AREDS) TABS Take by mouth        Probiotic Product (PROBIOTIC-10 PO) Take by mouth        Zn-Pyg Afri-Nettle-Saw Palmet (SAW PALMETTO COMPLEX PO) Take by mouth        amLODIPine (NORVASC) 5 MG tablet TAKE 1 TABLET NIGHTLY 90 tablet 3    benazepril (LOTENSIN) 20 MG tablet TAKE 1 TABLET EVERY DAY 90 tablet 3    Coenzyme Q10 (CO Q 10 PO) Take by mouth        Multiple Vitamins-Minerals (THERAPEUTIC MULTIVITAMIN-MINERALS) tablet Take 1 tablet by mouth daily        Vitamin D (CHOLECALCIFEROL) 1000 UNITS CAPS capsule Take 1,000 Units by mouth daily         niacin 500 MG CR capsule Take 500 mg by mouth nightly         aspirin 81 MG tablet Take 81 mg by mouth daily          No current facility-administered medications for this visit.         Allergies: Patient has no known allergies.     Family History         Family History   Problem Relation Age of Onset    Cancer Mother           lymphoma    Heart Disease Father              Social History            Tobacco Use    Smoking status: Former Smoker       Packs/day: 2.00       Years: 15.00       Pack years: 30.00       Types: Cigarettes       Quit date: 26       Years since quittin.2    Smokeless tobacco: Never Used   Substance Use Topics    Alcohol use: Yes       Comment: Occasional          Review of Systems   Constitutional: Positive for fatigue. Negative for chills. HENT: Negative for congestion and sore throat. Eyes: Negative for pain and redness. Respiratory: Positive for cough. Negative for wheezing. Cardiovascular: Negative for chest pain and palpitations. Gastrointestinal: Negative for abdominal distention and abdominal pain. Endocrine: Negative for polydipsia and polyphagia. Genitourinary: Negative for dysuria and hematuria. Musculoskeletal: Negative for arthralgias and back pain. Neurological: Positive for weakness. Negative for headaches. Psychiatric/Behavioral: Negative for confusion and dysphoric mood.         Physical Exam  Vitals reviewed. Constitutional:       General: He is not in acute distress. HENT:      Head: Normocephalic and atraumatic. Mouth/Throat:      Mouth: Mucous membranes are moist.   Eyes:      Pupils: Pupils are equal, round, and reactive to light. Cardiovascular:      Rate and Rhythm: Normal rate and regular rhythm. Pulmonary:      Effort: Pulmonary effort is normal. No respiratory distress. Abdominal:      General: There is no distension. Palpations: Abdomen is soft. Musculoskeletal:         General: No swelling. Normal range of motion. Cervical back: Neck supple. Skin:     General: Skin is warm and dry. Neurological:      General: No focal deficit present. Mental Status: He is alert. Mental status is at baseline.             Assessment and plan:  70year old male with lymphoma in need of port placement  The risks and benefits of port placement were discussed with the patient, including but not limited to, bleeding, infection, and pneumothorax. The patient expressed understanding and would like to proceed with placement.  Will plan placement tomorrow morning so that he can go to chemo from there.     Toshia Thompson MD  4/7/2022  4:35 PM

## 2022-04-08 ENCOUNTER — ANESTHESIA EVENT (OUTPATIENT)
Dept: OPERATING ROOM | Age: 72
End: 2022-04-08
Payer: MEDICARE

## 2022-04-08 ENCOUNTER — HOSPITAL ENCOUNTER (OUTPATIENT)
Age: 72
Setting detail: OUTPATIENT SURGERY
Discharge: HOME | End: 2022-04-08
Attending: SURGERY | Admitting: SURGERY
Payer: MEDICARE

## 2022-04-08 ENCOUNTER — APPOINTMENT (OUTPATIENT)
Dept: GENERAL RADIOLOGY | Age: 72
End: 2022-04-08
Attending: SURGERY
Payer: MEDICARE

## 2022-04-08 ENCOUNTER — ANESTHESIA (OUTPATIENT)
Dept: OPERATING ROOM | Age: 72
End: 2022-04-08
Payer: MEDICARE

## 2022-04-08 ENCOUNTER — HOSPITAL ENCOUNTER (OUTPATIENT)
Dept: INFUSION THERAPY | Age: 72
Discharge: HOME | End: 2022-04-08
Payer: MEDICARE

## 2022-04-08 VITALS
HEART RATE: 92 BPM | OXYGEN SATURATION: 97 % | DIASTOLIC BLOOD PRESSURE: 71 MMHG | HEIGHT: 67 IN | TEMPERATURE: 97 F | SYSTOLIC BLOOD PRESSURE: 126 MMHG | RESPIRATION RATE: 18 BRPM | BODY MASS INDEX: 24.33 KG/M2 | WEIGHT: 155 LBS

## 2022-04-08 VITALS — OXYGEN SATURATION: 95 % | SYSTOLIC BLOOD PRESSURE: 98 MMHG | DIASTOLIC BLOOD PRESSURE: 52 MMHG

## 2022-04-08 VITALS
HEART RATE: 74 BPM | RESPIRATION RATE: 16 BRPM | WEIGHT: 156 LBS | OXYGEN SATURATION: 96 % | HEIGHT: 67 IN | SYSTOLIC BLOOD PRESSURE: 120 MMHG | TEMPERATURE: 98.4 F | DIASTOLIC BLOOD PRESSURE: 73 MMHG | BODY MASS INDEX: 24.48 KG/M2

## 2022-04-08 DIAGNOSIS — I87.8 POOR VENOUS ACCESS: ICD-10-CM

## 2022-04-08 DIAGNOSIS — C85.98 LYMPHOMA OF LYMPH NODES OF MULTIPLE SITES (HCC): Primary | ICD-10-CM

## 2022-04-08 DIAGNOSIS — C85.10 LARGE B-CELL LYMPHOMA (HCC): Primary | ICD-10-CM

## 2022-04-08 DIAGNOSIS — C85.10 LARGE B-CELL LYMPHOMA (HCC): ICD-10-CM

## 2022-04-08 LAB
ALBUMIN SERPL-MCNC: 3.6 G/DL (ref 3.5–5.2)
ALP BLD-CCNC: 107 U/L (ref 40–130)
ALT SERPL-CCNC: 37 U/L (ref 21–72)
ANION GAP SERPL CALCULATED.3IONS-SCNC: 13 MMOL/L (ref 7–19)
AST SERPL-CCNC: 39 U/L (ref 17–59)
BILIRUB SERPL-MCNC: 0.7 MG/DL (ref 0.2–1.3)
BUN BLDV-MCNC: 14 MG/DL (ref 9–20)
CALCIUM SERPL-MCNC: 8.7 MG/DL (ref 8.4–10.2)
CHLORIDE BLD-SCNC: 105 MMOL/L (ref 98–111)
CO2: 22 MMOL/L (ref 22–29)
CREAT SERPL-MCNC: 0.5 MG/DL (ref 0.6–1.2)
GFR NON-AFRICAN AMERICAN: >60
GLOBULIN: 4.5 G/DL
GLUCOSE BLD-MCNC: 157 MG/DL (ref 74–106)
HCT VFR BLD CALC: 33.5 % (ref 40.1–51)
HEMOGLOBIN: 10.5 G/DL (ref 13.7–17.5)
MCH RBC QN AUTO: 28.2 PG (ref 25.7–32.2)
MCHC RBC AUTO-ENTMCNC: 31.3 G/DL (ref 32.3–36.5)
MCV RBC AUTO: 90.1 FL (ref 79–92.2)
PDW BLD-RTO: 15.9 % (ref 11.6–14.4)
PLATELET # BLD: 373 K/UL (ref 163–337)
PMV BLD AUTO: 8.2 FL (ref 7.4–10.4)
POTASSIUM SERPL-SCNC: 3.9 MMOL/L (ref 3.5–5.1)
RBC # BLD: 3.72 M/UL (ref 4.63–6.08)
SODIUM BLD-SCNC: 140 MMOL/L (ref 137–145)
TOTAL PROTEIN: 8.1 G/DL (ref 6.3–8.2)
WBC # BLD: 8 K/UL (ref 4.23–9.07)

## 2022-04-08 PROCEDURE — 3600000003 HC SURGERY LEVEL 3 BASE: Performed by: SURGERY

## 2022-04-08 PROCEDURE — 3700000001 HC ADD 15 MINUTES (ANESTHESIA): Performed by: SURGERY

## 2022-04-08 PROCEDURE — 96375 TX/PRO/DX INJ NEW DRUG ADDON: CPT

## 2022-04-08 PROCEDURE — A4216 STERILE WATER/SALINE, 10 ML: HCPCS | Performed by: ANESTHESIOLOGY

## 2022-04-08 PROCEDURE — 96415 CHEMO IV INFUSION ADDL HR: CPT

## 2022-04-08 PROCEDURE — 85027 COMPLETE CBC AUTOMATED: CPT

## 2022-04-08 PROCEDURE — 96413 CHEMO IV INFUSION 1 HR: CPT

## 2022-04-08 PROCEDURE — 2580000003 HC RX 258: Performed by: INTERNAL MEDICINE

## 2022-04-08 PROCEDURE — 6360000002 HC RX W HCPCS

## 2022-04-08 PROCEDURE — 6360000002 HC RX W HCPCS: Performed by: ANESTHESIOLOGY

## 2022-04-08 PROCEDURE — 2580000003 HC RX 258: Performed by: ANESTHESIOLOGY

## 2022-04-08 PROCEDURE — 6360000002 HC RX W HCPCS: Performed by: INTERNAL MEDICINE

## 2022-04-08 PROCEDURE — 71045 X-RAY EXAM CHEST 1 VIEW: CPT

## 2022-04-08 PROCEDURE — 3700000000 HC ANESTHESIA ATTENDED CARE: Performed by: SURGERY

## 2022-04-08 PROCEDURE — 36415 COLL VENOUS BLD VENIPUNCTURE: CPT

## 2022-04-08 PROCEDURE — 2709999900 HC NON-CHARGEABLE SUPPLY: Performed by: SURGERY

## 2022-04-08 PROCEDURE — 36561 INSERT TUNNELED CV CATH: CPT | Performed by: SURGERY

## 2022-04-08 PROCEDURE — 3209999900 FLUORO FOR SURGICAL PROCEDURES

## 2022-04-08 PROCEDURE — 76937 US GUIDE VASCULAR ACCESS: CPT | Performed by: SURGERY

## 2022-04-08 PROCEDURE — 96377 APPLICATON ON-BODY INJECTOR: CPT

## 2022-04-08 PROCEDURE — 3600000013 HC SURGERY LEVEL 3 ADDTL 15MIN: Performed by: SURGERY

## 2022-04-08 PROCEDURE — 96411 CHEMO IV PUSH ADDL DRUG: CPT

## 2022-04-08 PROCEDURE — 7100000011 HC PHASE II RECOVERY - ADDTL 15 MIN: Performed by: SURGERY

## 2022-04-08 PROCEDURE — 6360000002 HC RX W HCPCS: Performed by: SURGERY

## 2022-04-08 PROCEDURE — 96417 CHEMO IV INFUS EACH ADDL SEQ: CPT

## 2022-04-08 PROCEDURE — 80053 COMPREHEN METABOLIC PANEL: CPT

## 2022-04-08 PROCEDURE — C1788 PORT, INDWELLING, IMP: HCPCS | Performed by: SURGERY

## 2022-04-08 PROCEDURE — 2500000003 HC RX 250 WO HCPCS: Performed by: ANESTHESIOLOGY

## 2022-04-08 PROCEDURE — 77001 FLUOROGUIDE FOR VEIN DEVICE: CPT | Performed by: SURGERY

## 2022-04-08 PROCEDURE — 2500000003 HC RX 250 WO HCPCS

## 2022-04-08 PROCEDURE — 96367 TX/PROPH/DG ADDL SEQ IV INF: CPT

## 2022-04-08 PROCEDURE — 7100000010 HC PHASE II RECOVERY - FIRST 15 MIN: Performed by: SURGERY

## 2022-04-08 PROCEDURE — 6370000000 HC RX 637 (ALT 250 FOR IP): Performed by: INTERNAL MEDICINE

## 2022-04-08 PROCEDURE — 2500000003 HC RX 250 WO HCPCS: Performed by: SURGERY

## 2022-04-08 DEVICE — PORT INFUS PLAS SGL LUMN W/ 9.6FR SIL CATH AIRGUARD VLV: Type: IMPLANTABLE DEVICE | Site: CHEST | Status: FUNCTIONAL

## 2022-04-08 RX ORDER — PALONOSETRON 0.05 MG/ML
0.25 INJECTION, SOLUTION INTRAVENOUS ONCE
Status: CANCELLED | OUTPATIENT
Start: 2022-04-08 | End: 2022-04-08

## 2022-04-08 RX ORDER — CEFAZOLIN SODIUM 1 G/3ML
INJECTION, POWDER, FOR SOLUTION INTRAMUSCULAR; INTRAVENOUS PRN
Status: DISCONTINUED | OUTPATIENT
Start: 2022-04-08 | End: 2022-04-08 | Stop reason: SDUPTHER

## 2022-04-08 RX ORDER — PALONOSETRON 0.05 MG/ML
0.25 INJECTION, SOLUTION INTRAVENOUS ONCE
Status: COMPLETED | OUTPATIENT
Start: 2022-04-08 | End: 2022-04-08

## 2022-04-08 RX ORDER — HYDROCODONE BITARTRATE AND ACETAMINOPHEN 5; 325 MG/1; MG/1
1 TABLET ORAL EVERY 4 HOURS PRN
Status: DISCONTINUED | OUTPATIENT
Start: 2022-04-08 | End: 2022-04-08 | Stop reason: HOSPADM

## 2022-04-08 RX ORDER — HEPARIN SODIUM,PORCINE 10 UNIT/ML
VIAL (ML) INTRAVENOUS PRN
Status: DISCONTINUED | OUTPATIENT
Start: 2022-04-08 | End: 2022-04-08 | Stop reason: ALTCHOICE

## 2022-04-08 RX ORDER — SODIUM CHLORIDE 0.9 % (FLUSH) 0.9 %
5-40 SYRINGE (ML) INJECTION PRN
Status: CANCELLED | OUTPATIENT
Start: 2022-04-08

## 2022-04-08 RX ORDER — DIPHENHYDRAMINE HYDROCHLORIDE 50 MG/ML
25 INJECTION INTRAMUSCULAR; INTRAVENOUS ONCE
Status: CANCELLED | OUTPATIENT
Start: 2022-04-08

## 2022-04-08 RX ORDER — ONDANSETRON 4 MG/1
4 TABLET, FILM COATED ORAL EVERY 8 HOURS PRN
Qty: 30 TABLET | Refills: 1 | Status: SHIPPED | OUTPATIENT
Start: 2022-04-08 | End: 2022-08-25

## 2022-04-08 RX ORDER — ONDANSETRON 4 MG/1
4 TABLET, ORALLY DISINTEGRATING ORAL EVERY 8 HOURS PRN
Status: CANCELLED | OUTPATIENT
Start: 2022-04-08

## 2022-04-08 RX ORDER — HYDROCODONE BITARTRATE AND ACETAMINOPHEN 5; 325 MG/1; MG/1
2 TABLET ORAL EVERY 4 HOURS PRN
Status: DISCONTINUED | OUTPATIENT
Start: 2022-04-08 | End: 2022-04-08 | Stop reason: HOSPADM

## 2022-04-08 RX ORDER — ONDANSETRON 2 MG/ML
4 INJECTION INTRAMUSCULAR; INTRAVENOUS EVERY 6 HOURS PRN
Status: CANCELLED | OUTPATIENT
Start: 2022-04-08

## 2022-04-08 RX ORDER — SODIUM CHLORIDE 9 MG/ML
25 INJECTION, SOLUTION INTRAVENOUS PRN
Status: DISCONTINUED | OUTPATIENT
Start: 2022-04-08 | End: 2022-04-08 | Stop reason: HOSPADM

## 2022-04-08 RX ORDER — EPINEPHRINE 1 MG/ML
0.3 INJECTION, SOLUTION, CONCENTRATE INTRAVENOUS PRN
Status: CANCELLED | OUTPATIENT
Start: 2022-04-08

## 2022-04-08 RX ORDER — SODIUM CHLORIDE 9 MG/ML
20 INJECTION, SOLUTION INTRAVENOUS ONCE
Status: CANCELLED | OUTPATIENT
Start: 2022-04-08 | End: 2022-04-08

## 2022-04-08 RX ORDER — SODIUM CHLORIDE 9 MG/ML
5-40 INJECTION INTRAVENOUS PRN
Status: CANCELLED | OUTPATIENT
Start: 2022-04-08

## 2022-04-08 RX ORDER — SODIUM CHLORIDE 0.9 % (FLUSH) 0.9 %
5-40 SYRINGE (ML) INJECTION EVERY 12 HOURS SCHEDULED
Status: CANCELLED | OUTPATIENT
Start: 2022-04-08

## 2022-04-08 RX ORDER — LIDOCAINE AND PRILOCAINE 25; 25 MG/G; MG/G
CREAM TOPICAL
Qty: 1 EACH | Refills: 1 | Status: SHIPPED | OUTPATIENT
Start: 2022-04-08

## 2022-04-08 RX ORDER — DIPHENHYDRAMINE HYDROCHLORIDE 50 MG/ML
50 INJECTION INTRAMUSCULAR; INTRAVENOUS
Status: CANCELLED | OUTPATIENT
Start: 2022-04-08

## 2022-04-08 RX ORDER — SODIUM CHLORIDE 9 MG/ML
INJECTION, SOLUTION INTRAVENOUS CONTINUOUS
Status: CANCELLED | OUTPATIENT
Start: 2022-04-08

## 2022-04-08 RX ORDER — ACETAMINOPHEN 500 MG
1000 TABLET ORAL ONCE
Status: COMPLETED | OUTPATIENT
Start: 2022-04-08 | End: 2022-04-08

## 2022-04-08 RX ORDER — SODIUM CHLORIDE 9 MG/ML
20 INJECTION, SOLUTION INTRAVENOUS ONCE
Status: COMPLETED | OUTPATIENT
Start: 2022-04-08 | End: 2022-04-09

## 2022-04-08 RX ORDER — HEPARIN SODIUM (PORCINE) LOCK FLUSH IV SOLN 100 UNIT/ML 100 UNIT/ML
500 SOLUTION INTRAVENOUS PRN
Status: CANCELLED | OUTPATIENT
Start: 2022-04-08

## 2022-04-08 RX ORDER — ONDANSETRON 2 MG/ML
4 INJECTION INTRAMUSCULAR; INTRAVENOUS
Status: DISCONTINUED | OUTPATIENT
Start: 2022-04-08 | End: 2022-04-08 | Stop reason: HOSPADM

## 2022-04-08 RX ORDER — SODIUM CHLORIDE 0.9 % (FLUSH) 0.9 %
5-40 SYRINGE (ML) INJECTION PRN
Status: DISCONTINUED | OUTPATIENT
Start: 2022-04-08 | End: 2022-04-09 | Stop reason: HOSPADM

## 2022-04-08 RX ORDER — BUPIVACAINE HYDROCHLORIDE AND EPINEPHRINE 2.5; 5 MG/ML; UG/ML
INJECTION, SOLUTION EPIDURAL; INFILTRATION; INTRACAUDAL; PERINEURAL PRN
Status: DISCONTINUED | OUTPATIENT
Start: 2022-04-08 | End: 2022-04-08 | Stop reason: ALTCHOICE

## 2022-04-08 RX ORDER — SODIUM CHLORIDE 9 MG/ML
25 INJECTION, SOLUTION INTRAVENOUS PRN
Status: CANCELLED | OUTPATIENT
Start: 2022-04-08

## 2022-04-08 RX ORDER — SODIUM CHLORIDE 0.9 % (FLUSH) 0.9 %
5-40 SYRINGE (ML) INJECTION PRN
Status: DISCONTINUED | OUTPATIENT
Start: 2022-04-08 | End: 2022-04-08 | Stop reason: HOSPADM

## 2022-04-08 RX ORDER — DEXAMETHASONE SODIUM PHOSPHATE 4 MG/ML
4 INJECTION, SOLUTION INTRA-ARTICULAR; INTRALESIONAL; INTRAMUSCULAR; INTRAVENOUS; SOFT TISSUE ONCE
Status: COMPLETED | OUTPATIENT
Start: 2022-04-08 | End: 2022-04-08

## 2022-04-08 RX ORDER — HYDROCODONE BITARTRATE AND ACETAMINOPHEN 5; 325 MG/1; MG/1
1 TABLET ORAL EVERY 6 HOURS PRN
Qty: 12 TABLET | Refills: 0 | Status: SHIPPED | OUTPATIENT
Start: 2022-04-08 | End: 2022-04-11

## 2022-04-08 RX ORDER — LIDOCAINE HYDROCHLORIDE 10 MG/ML
INJECTION, SOLUTION EPIDURAL; INFILTRATION; INTRACAUDAL; PERINEURAL PRN
Status: DISCONTINUED | OUTPATIENT
Start: 2022-04-08 | End: 2022-04-08 | Stop reason: SDUPTHER

## 2022-04-08 RX ORDER — ACETAMINOPHEN 325 MG/1
1000 TABLET ORAL ONCE
Status: CANCELLED | OUTPATIENT
Start: 2022-04-08

## 2022-04-08 RX ORDER — MORPHINE SULFATE 4 MG/ML
4 INJECTION, SOLUTION INTRAMUSCULAR; INTRAVENOUS
Status: DISCONTINUED | OUTPATIENT
Start: 2022-04-08 | End: 2022-04-08 | Stop reason: HOSPADM

## 2022-04-08 RX ORDER — DIPHENHYDRAMINE HYDROCHLORIDE 50 MG/ML
50 INJECTION INTRAMUSCULAR; INTRAVENOUS ONCE
Status: DISCONTINUED | OUTPATIENT
Start: 2022-04-08 | End: 2022-04-08

## 2022-04-08 RX ORDER — HYDROMORPHONE HYDROCHLORIDE 1 MG/ML
0.25 INJECTION, SOLUTION INTRAMUSCULAR; INTRAVENOUS; SUBCUTANEOUS EVERY 5 MIN PRN
Status: DISCONTINUED | OUTPATIENT
Start: 2022-04-08 | End: 2022-04-08 | Stop reason: HOSPADM

## 2022-04-08 RX ORDER — MORPHINE SULFATE 4 MG/ML
2 INJECTION, SOLUTION INTRAMUSCULAR; INTRAVENOUS
Status: DISCONTINUED | OUTPATIENT
Start: 2022-04-08 | End: 2022-04-08 | Stop reason: HOSPADM

## 2022-04-08 RX ORDER — PROPOFOL 10 MG/ML
INJECTION, EMULSION INTRAVENOUS PRN
Status: DISCONTINUED | OUTPATIENT
Start: 2022-04-08 | End: 2022-04-08 | Stop reason: SDUPTHER

## 2022-04-08 RX ORDER — MEPERIDINE HYDROCHLORIDE 50 MG/ML
12.5 INJECTION INTRAMUSCULAR; INTRAVENOUS; SUBCUTANEOUS PRN
Status: CANCELLED | OUTPATIENT
Start: 2022-04-08

## 2022-04-08 RX ORDER — SODIUM CHLORIDE 0.9 % (FLUSH) 0.9 %
5-40 SYRINGE (ML) INJECTION EVERY 12 HOURS SCHEDULED
Status: DISCONTINUED | OUTPATIENT
Start: 2022-04-08 | End: 2022-04-08 | Stop reason: HOSPADM

## 2022-04-08 RX ORDER — HYDROMORPHONE HYDROCHLORIDE 1 MG/ML
0.5 INJECTION, SOLUTION INTRAMUSCULAR; INTRAVENOUS; SUBCUTANEOUS EVERY 5 MIN PRN
Status: DISCONTINUED | OUTPATIENT
Start: 2022-04-08 | End: 2022-04-08 | Stop reason: HOSPADM

## 2022-04-08 RX ORDER — FENTANYL CITRATE 50 UG/ML
INJECTION, SOLUTION INTRAMUSCULAR; INTRAVENOUS PRN
Status: DISCONTINUED | OUTPATIENT
Start: 2022-04-08 | End: 2022-04-08 | Stop reason: SDUPTHER

## 2022-04-08 RX ORDER — HEPARIN SODIUM (PORCINE) LOCK FLUSH IV SOLN 100 UNIT/ML 100 UNIT/ML
500 SOLUTION INTRAVENOUS PRN
Status: DISCONTINUED | OUTPATIENT
Start: 2022-04-08 | End: 2022-04-09 | Stop reason: HOSPADM

## 2022-04-08 RX ORDER — ONDANSETRON 2 MG/ML
8 INJECTION INTRAMUSCULAR; INTRAVENOUS
Status: CANCELLED | OUTPATIENT
Start: 2022-04-08

## 2022-04-08 RX ORDER — ACETAMINOPHEN 325 MG/1
650 TABLET ORAL
Status: CANCELLED | OUTPATIENT
Start: 2022-04-08

## 2022-04-08 RX ORDER — DEXAMETHASONE SODIUM PHOSPHATE 10 MG/ML
10 INJECTION, SOLUTION INTRAMUSCULAR; INTRAVENOUS ONCE
Status: COMPLETED | OUTPATIENT
Start: 2022-04-08 | End: 2022-04-08

## 2022-04-08 RX ORDER — ALBUTEROL SULFATE 90 UG/1
4 AEROSOL, METERED RESPIRATORY (INHALATION) PRN
Status: CANCELLED | OUTPATIENT
Start: 2022-04-08

## 2022-04-08 RX ORDER — SODIUM CHLORIDE, SODIUM LACTATE, POTASSIUM CHLORIDE, CALCIUM CHLORIDE 600; 310; 30; 20 MG/100ML; MG/100ML; MG/100ML; MG/100ML
INJECTION, SOLUTION INTRAVENOUS CONTINUOUS
Status: DISCONTINUED | OUTPATIENT
Start: 2022-04-08 | End: 2022-04-08 | Stop reason: HOSPADM

## 2022-04-08 RX ADMIN — SODIUM CHLORIDE 690 MG: 9 INJECTION, SOLUTION INTRAVENOUS at 12:34

## 2022-04-08 RX ADMIN — PROPOFOL 100 MCG/KG/MIN: 10 INJECTION, EMULSION INTRAVENOUS at 09:28

## 2022-04-08 RX ADMIN — LIDOCAINE HYDROCHLORIDE 5 ML: 10 INJECTION, SOLUTION EPIDURAL; INFILTRATION; INTRACAUDAL; PERINEURAL at 09:20

## 2022-04-08 RX ADMIN — DIPHENHYDRAMINE HYDROCHLORIDE 50 MG: 50 INJECTION, SOLUTION INTRAMUSCULAR; INTRAVENOUS at 11:42

## 2022-04-08 RX ADMIN — SODIUM CHLORIDE, SODIUM LACTATE, POTASSIUM CHLORIDE, AND CALCIUM CHLORIDE: 600; 310; 30; 20 INJECTION, SOLUTION INTRAVENOUS at 06:39

## 2022-04-08 RX ADMIN — SODIUM CHLORIDE 20 ML/HR: 9 INJECTION, SOLUTION INTRAVENOUS at 11:36

## 2022-04-08 RX ADMIN — VINCRISTINE SULFATE 2 MG: 1 INJECTION, SOLUTION INTRAVENOUS at 17:54

## 2022-04-08 RX ADMIN — PROPOFOL 30 MG: 10 INJECTION, EMULSION INTRAVENOUS at 09:24

## 2022-04-08 RX ADMIN — FAMOTIDINE 20 MG: 10 INJECTION, SOLUTION INTRAVENOUS at 07:25

## 2022-04-08 RX ADMIN — ACETAMINOPHEN 1000 MG: 500 TABLET ORAL at 11:35

## 2022-04-08 RX ADMIN — DEXAMETHASONE SODIUM PHOSPHATE 10 MG: 10 INJECTION, SOLUTION INTRAMUSCULAR; INTRAVENOUS at 11:35

## 2022-04-08 RX ADMIN — PROPOFOL 40 MG: 10 INJECTION, EMULSION INTRAVENOUS at 09:26

## 2022-04-08 RX ADMIN — FENTANYL CITRATE 25 MCG: 50 INJECTION, SOLUTION INTRAMUSCULAR; INTRAVENOUS at 09:42

## 2022-04-08 RX ADMIN — CYCLOPHOSPHAMIDE 1380 MG: 200 INJECTION, SOLUTION INTRAVENOUS at 16:17

## 2022-04-08 RX ADMIN — PROPOFOL 50 MG: 10 INJECTION, EMULSION INTRAVENOUS at 09:20

## 2022-04-08 RX ADMIN — SODIUM CHLORIDE, SODIUM LACTATE, POTASSIUM CHLORIDE, AND CALCIUM CHLORIDE: 600; 310; 30; 20 INJECTION, SOLUTION INTRAVENOUS at 09:55

## 2022-04-08 RX ADMIN — HEPARIN 500 UNITS: 100 SYRINGE at 18:06

## 2022-04-08 RX ADMIN — SODIUM CHLORIDE, PRESERVATIVE FREE 10 ML: 5 INJECTION INTRAVENOUS at 18:06

## 2022-04-08 RX ADMIN — PROPOFOL 20 MG: 10 INJECTION, EMULSION INTRAVENOUS at 09:22

## 2022-04-08 RX ADMIN — CEFAZOLIN SODIUM 2000 MG: 1 INJECTION, POWDER, FOR SOLUTION INTRAMUSCULAR; INTRAVENOUS at 09:27

## 2022-04-08 RX ADMIN — PEGFILGRASTIM 6 MG: KIT SUBCUTANEOUS at 17:54

## 2022-04-08 RX ADMIN — FOSAPREPITANT 150 MG: 150 INJECTION, POWDER, LYOPHILIZED, FOR SOLUTION INTRAVENOUS at 11:59

## 2022-04-08 RX ADMIN — FENTANYL CITRATE 25 MCG: 50 INJECTION, SOLUTION INTRAMUSCULAR; INTRAVENOUS at 09:27

## 2022-04-08 RX ADMIN — SODIUM CHLORIDE 92 MG: 9 INJECTION, SOLUTION INTRAVENOUS at 16:52

## 2022-04-08 RX ADMIN — DEXAMETHASONE SODIUM PHOSPHATE 4 MG: 4 INJECTION, SOLUTION INTRAMUSCULAR; INTRAVENOUS at 07:25

## 2022-04-08 RX ADMIN — PALONOSETRON HYDROCHLORIDE 0.25 MG: 0.25 INJECTION, SOLUTION INTRAVENOUS at 11:34

## 2022-04-08 ASSESSMENT — PAIN SCALES - GENERAL
PAINLEVEL_OUTOF10: 0
PAINLEVEL_OUTOF10: 2

## 2022-04-08 ASSESSMENT — PAIN - FUNCTIONAL ASSESSMENT: PAIN_FUNCTIONAL_ASSESSMENT: 0-10

## 2022-04-08 ASSESSMENT — LIFESTYLE VARIABLES: SMOKING_STATUS: 0

## 2022-04-08 NOTE — ANESTHESIA POSTPROCEDURE EVALUATION
Department of Anesthesiology  Postprocedure Note    Patient: Maninder Mejia  MRN: [id number]  YOB: 1950  Date of evaluation: 4/8/2022  Time:  10:10 AM     Procedure Summary     Date: 04/08/22 Room / Location: [address]    Anesthesia Start: 7581 Anesthesia Stop: 1010    Procedure: SINGLE LUMEN PORT PLACEMENT WITH US & FLUORO (N/A Chest) Diagnosis: (C85.98 - LYMPHOMA)    Surgeons: Ronak Ward MD Responsible Provider: MICHELA De Los Santos CRNA    Anesthesia Type: MAC ASA Status: 3          Anesthesia Type: MAC    Arianne Phase I:      Arianne Phase II:      Last vitals: Reviewed and per EMR flowsheets. Anesthesia Post Evaluation    Patient location: returned patient to same day/preop holding area.   Patient participation: complete - patient participated  Level of consciousness: awake and alert  Pain score: 0  Airway patency: patent  Nausea & Vomiting: no nausea and no vomiting  Complications: no  Respiratory status: acceptable and room air  Hydration status: euvolemic

## 2022-04-08 NOTE — INTERVAL H&P NOTE
Update History & Physical    The patient's History and Physical of April 7, 2022 was reviewed with the patient and I examined the patient. There was no change. The surgical site was confirmed by the patient and me. Plan: The risks, benefits, expected outcome, and alternative to the recommended procedure have been discussed with the patient. Patient understands and wants to proceed with the procedure.      Electronically signed by Giovanni Trinidad MD on 4/8/2022 at 9:10 AM

## 2022-04-08 NOTE — ANESTHESIA PRE PROCEDURE
Department of Anesthesiology  Preprocedure Note       Name:  Kulwant Porter   Age:  70 y.o.  :  1950                                          MRN:  [id number]         Date:  2022      Surgeon: Deni Potts):  Aman Vides MD    Procedure: Procedure(s):  SINGLE LUMEN PORT PLACEMENT WITH US & FLUORO    Medications prior to admission:   Prior to Admission medications    Medication Sig Start Date End Date Taking?  Authorizing Provider   allopurinol (ZYLOPRIM) 300 MG tablet Take 1 tablet by mouth daily 22   Ayaka Gaines MD   predniSONE (DELTASONE) 50 MG tablet Take 2 tablets by mouth daily for 5 days  Patient not taking: Reported on 2022  Ayaka Gaines MD   promethazine-dextromethorphan (PROMETHAZINE-DM) 6.25-15 MG/5ML syrup 1-2 tsp every hs  prn cough  Patient not taking: Reported on 2022 3/17/22   MICHELA Lynn CNP   turmeric (QC TUMERIC COMPLEX) 500 MG CAPS Take by mouth daily    Historical Provider, MD   zinc gluconate 50 MG tablet Take 50 mg by mouth daily    Historical Provider, MD   ferrous sulfate (IRON 325) 325 (65 Fe) MG tablet Take 325 mg by mouth daily (with breakfast)    Historical Provider, MD   KRILL OIL PO Take by mouth    Historical Provider, MD   Red Yeast Rice Extract (RED YEAST RICE PO) Take by mouth    Historical Provider, MD   Multiple Vitamins-Minerals (PRESERVISION AREDS) TABS Take by mouth    Historical Provider, MD   Probiotic Product (PROBIOTIC-10 PO) Take by mouth    Historical Provider, MD   Zn-Pyg Afri-Nettle-Saw Palmet (SAW PALMETTO COMPLEX PO) Take by mouth    Historical Provider, MD   amLODIPine (NORVASC) 5 MG tablet TAKE 1 TABLET NIGHTLY 21   Massimo Jaeger APRN - CNP   benazepril (LOTENSIN) 20 MG tablet TAKE 1 TABLET EVERY DAY 21   MICHELA Lynn - CNP   Coenzyme Q10 (CO Q 10 PO) Take by mouth    Historical Provider, MD   Multiple Vitamins-Minerals (THERAPEUTIC MULTIVITAMIN-MINERALS) tablet Take 1 tablet by mouth daily    Historical Provider, MD   Vitamin D (CHOLECALCIFEROL) 1000 UNITS CAPS capsule Take 1,000 Units by mouth daily     Historical Provider, MD   niacin 500 MG CR capsule Take 500 mg by mouth nightly     Historical Provider, MD   aspirin 81 MG tablet Take 81 mg by mouth daily    Historical Provider, MD       Current medications:    Current Facility-Administered Medications   Medication Dose Route Frequency Provider Last Rate Last Admin    0.9 % sodium chloride infusion  25 mL IntraVENous PRN Ronak Ward MD        ceFAZolin (ANCEF) 2000 mg in 0.9% sodium chloride 50 mL IVPB  2,000 mg IntraVENous On Call to [facility] Hipolito Fenton, MD        sodium chloride flush 0.9 % injection 5-40 mL  5-40 mL IntraVENous 2 times per day Ronak Ward MD        sodium chloride flush 0.9 % injection 5-40 mL  5-40 mL IntraVENous PRN Ronak Ward MD           Allergies:  No Known Allergies    Problem List:    Patient Active Problem List   Diagnosis Code    Murmur, cardiac R01.1    Essential hypertension I10    Tinnitus of both ears H93.13    Sensorineural hearing loss (SNHL) of both ears H90.3    COVID-19 U07.1    Lymphoma of lymph nodes of multiple sites ([facility] [address] C85.98    Large B-cell lymphoma ([facility] [address] C85.10       Past Medical History:        Diagnosis Date    Cancer ([facility] [address]     lymphoma    Chronic neck pain     Herniated disc, cervical     HTN (hypertension)        Past Surgical History:        Procedure Laterality Date    LYMPH NODE BIOPSY      left side of neck       Social History:    Social History     Tobacco Use    Smoking status: Former Smoker     Packs/day: 2.00     Years: 15.00     Pack years: 30.00     Types: Cigarettes     Quit date:      Years since quittin.2    Smokeless tobacco: Never Used   Substance Use Topics    Alcohol use: Yes     Comment: Occasional                                 Counseling given: Not Answered      Vital Signs (Current):   Vitals:    22 2701 22 7904 BP: (!) 150/74    Pulse: 105 105   Resp: 20    Temp: 98.5 °F (36.9 °C)    TempSrc: Tympanic    SpO2: 96%    Weight: 155 lb (70.3 kg)    Height: 5' 7\" (1.702 m)                                               BP Readings from Last 3 Encounters:   04/08/22 (!) 150/74   04/07/22 124/70   04/06/22 (!) 168/84       NPO Status: Time of last liquid consumption: 0000                        Time of last solid consumption: 0000                        Date of last liquid consumption: 04/07/22                        Date of last solid food consumption: 04/07/22    BMI:   Wt Readings from Last 3 Encounters:   04/08/22 155 lb (70.3 kg)   04/07/22 155 lb 12.8 oz (70.7 kg)   04/06/22 156 lb 1.6 oz (70.8 kg)     Body mass index is 24.28 kg/m².     CBC:   Lab Results   Component Value Date    WBC 9.64 04/06/2022    RBC 3.67 04/06/2022    HGB 10.5 04/06/2022    HCT 33.1 04/06/2022    MCV 90.2 04/06/2022    RDW 16.6 04/06/2022     04/06/2022       CMP:   Lab Results   Component Value Date     03/24/2022    K 4.1 03/24/2022     03/24/2022    CO2 26 03/24/2022    BUN 20 03/24/2022    CREATININE 0.6 03/24/2022    GFRAA >59 02/24/2022    AGRATIO 1.5 09/19/2016    LABGLOM >60 03/24/2022    GLUCOSE 96 03/24/2022    PROT 7.6 03/24/2022    CALCIUM 8.2 03/24/2022    BILITOT 0.5 03/24/2022    ALKPHOS 92 03/24/2022    AST 51 03/24/2022    ALT 39 03/24/2022       POC Tests:   Recent Labs     04/07/22  1055   POCGLU 80       Coags: No results found for: PROTIME, INR, APTT    HCG (If Applicable): No results found for: PREGTESTUR, PREGSERUM, HCG, HCGQUANT     ABGs: No results found for: PHART, PO2ART, TBI7DYV, HBO6DQW, BEART, Z0PEQCSK     Type & Screen (If Applicable):  No results found for: LABABO, LABRH    Drug/Infectious Status (If Applicable):  No results found for: HIV, HEPCAB    COVID-19 Screening (If Applicable):   Lab Results   Component Value Date    COVID19 DETECTED 12/26/2021           Anesthesia Evaluation  Patient summary reviewed no history of anesthetic complications:   Airway: Mallampati: I  TM distance: >3 FB   Neck ROM: full  Mouth opening: > = 3 FB Dental:    (+) poor dentition      Pulmonary:normal exam  breath sounds clear to auscultation      (-) asthma, recent URI, sleep apnea and not a current smoker          Patient did not smoke on day of surgery. Cardiovascular:  Exercise tolerance: good (>4 METS),   (+) hypertension:, murmur,     (-) pacemaker, past MI, CABG/stent and  angina    ECG reviewed  Rhythm: regular  Rate: normal           Beta Blocker:  Not on Beta Blocker      ROS comment: Reports SOB recently with exertion, but denies chest pain     Neuro/Psych:      (-) seizures, TIA and CVA           GI/Hepatic/Renal:        (-) GERD, liver disease and no renal disease       Endo/Other:    (+) malignancy/cancer (Lymphoma). (-) diabetes mellitus, hypothyroidism, hyperthyroidism               Abdominal:             Vascular:     - DVT. Other Findings:           Anesthesia Plan      MAC     ASA 3     (Preop famotidine, dexamethasone  Discussed conversion to GETA if necessary)  Induction: intravenous. MIPS: Postoperative opioids intended and Prophylactic antiemetics administered. Anesthetic plan and risks discussed with patient and spouse. Use of blood products discussed with patient and spouse whom consented to blood products.                  Benito Duarte MD   4/8/2022

## 2022-04-08 NOTE — DISCHARGE INSTR - ACTIVITY
Activity as tolerated. Okay to shower over the incisions in 24 hours, but do not submerge them under water like a tub or pool for 10 days. Watch for redness, drainage, or fever, and call for any questions or concerns.

## 2022-04-08 NOTE — DISCHARGE INSTR - DIET
Good nutrition is important when healing from an illness, injury, or surgery. Follow any nutrition recommendations given to you during your hospital stay. If you were given an oral nutrition supplement while in the hospital, continue to take this supplement at home. You can take it with meals, in-between meals, and/or before bedtime. These supplements can be purchased at most local grocery stores, pharmacies, and chain [employer]-stores. If you have any questions about your diet or nutrition, call the hospital and ask for the dietitian.     Regular diet as tolerated

## 2022-04-08 NOTE — OP NOTE
Operative Report    PATIENT NAME: [facility] RECORD NO. [id number]  SURGEON: Gene Santos MD    Primary Care Physician: Nga Hernandes, APRN - CNP  Date: 04/08/22        PROCEDURE PERFORMED: right IJ single lumen PAC with US and fluoro  PREOPERATIVE DIAGNOSIS: lymphoma  POSTOPERATIVE DIAGNOSIS: Same  SURGEON:  Hernán Rousseau MD   Assistant: Yenny Driver  ANESTHESIA:  Monitored Local Anesthesia with Sedation and local  ESTIMATED BLOOD LOSS:  minimal  SPECIMEN:  none  COMPLICATIONS:  None; patient tolerated the procedure well. FINDINGS: patent right IJ  DISPOSITION: PACU - hemodynamically stable. CONDITION: stable      Indications: The patient presented with a recent diagnosis of lymphoma, now in need of port placement. Procedure Details     After the risks and benefits of the procedure were explained, informed consent was obtained, and the patient was taken to the operating room. The patient was placed in supine position and sedation was begun. The neck and chest were prepped and draped in normal sterile fashion. Preoperative antibiotics had been given. A time out was performed. The patient was placed in head down position. An ultrasound was used to visualize the right IJ. Local anesthetic was injected and an 18 gauge cook needle was used to cannulate the vein. Dark red, non-pulsatile blood was returned. The guide wire was inserted through the needle and advanced without difficulty. Fluoro was used to insure correct placement of the guidewire. A location was then chosen on the chest for creating a pocket. Local anesthetic was injected at this location, as well as along the path for tunneling the catheter. A 3 cm incision was made, and cautery was used to dissect down to the chest wall. A pocket was created inferior to this. A small incision was then made in the skin of the neck at the level of the guidewire. The catheter and port were assembled and flushed.  The catheter was then tunneled from the chest incision out the small neck incision. The port was secured to the chest wall using PDS. Fluoro was used to guide trimming the catheter to the appropriate length. The sheath dilator was then placed over the guidewire and advanced. The dilator and guidewire were removed. The catheter was inserted in the sheath, the sheath was broken in half, and the catheter was advanced. Fluoro was used to insure appropriate positioning of the catheter tip. There were no signs of pneumothorax. The port was then aspirated and flushed with heparin solution. The small neck incision was closed with 4-0 monocryl subcuticular stitch. The chest incision was re approximated with 3-0 vicryl and then the skin was closed using 4-0 monocryl subcuticular stitches. Sterile dressings with dermabond were applied. The patient tolerated the procedure well, was removed from anesthesia, and taken to the recovery room in stable condition.                 Mat Ceballos MD    Electronically signed 04/08/22 at 10:02 AM

## 2022-04-13 LAB
CYTO UR: NORMAL
LAB AP CASE REPORT: NORMAL
LAB AP CLINICAL INFORMATION: NORMAL
LAB AP DIAGNOSIS COMMENT: NORMAL
LAB AP FLOW CYTOMETRY SUMMARY: NORMAL
LAB AP SYNOPTIC CHECKLIST: NORMAL
PATH REPORT.FINAL DX SPEC: NORMAL
PATH REPORT.GROSS SPEC: NORMAL

## 2022-04-13 RX ORDER — POLYETHYLENE GLYCOL 3350 17 G/17G
17 POWDER, FOR SOLUTION ORAL DAILY PRN
Qty: 850 G | Refills: 1 | Status: SHIPPED | OUTPATIENT
Start: 2022-04-13 | End: 2022-05-13

## 2022-04-14 ENCOUNTER — TELEPHONE (OUTPATIENT)
Dept: INFUSION THERAPY | Age: 72
End: 2022-04-14

## 2022-04-14 NOTE — TELEPHONE ENCOUNTER
Left a message for Mr. Partida to go over [facility]. Asked him to give me a call.

## 2022-04-18 DIAGNOSIS — C83.30 DIFFUSE LARGE B-CELL LYMPHOMA, UNSPECIFIED BODY REGION (HCC): Primary | ICD-10-CM

## 2022-04-19 ENCOUNTER — HOSPITAL ENCOUNTER (OUTPATIENT)
Dept: INFUSION THERAPY | Age: 72
Discharge: HOME | End: 2022-04-19
Payer: MEDICARE

## 2022-04-19 DIAGNOSIS — C83.30 DIFFUSE LARGE B-CELL LYMPHOMA, UNSPECIFIED BODY REGION (HCC): ICD-10-CM

## 2022-04-19 LAB
HCT VFR BLD CALC: 34.9 % (ref 40.1–51)
HEMOGLOBIN: 10.7 G/DL (ref 13.7–17.5)
MCH RBC QN AUTO: 28.1 PG (ref 25.7–32.2)
MCHC RBC AUTO-ENTMCNC: 30.7 G/DL (ref 32.3–36.5)
MCV RBC AUTO: 91.6 FL (ref 79–92.2)
PDW BLD-RTO: 16.4 % (ref 11.6–14.4)
PLATELET # BLD: 186 K/UL (ref 163–337)
PMV BLD AUTO: 9.5 FL (ref 7.4–10.4)
RBC # BLD: 3.81 M/UL (ref 4.63–6.08)
WBC # BLD: 7.4 K/UL (ref 4.23–9.07)

## 2022-04-19 PROCEDURE — 85027 COMPLETE CBC AUTOMATED: CPT

## 2022-04-28 NOTE — PROGRESS NOTES
MEDICAL ONCOLOGY PROGRESS NOTE    Pt Name: Juany Richardson  MRN: [id number]  YOB: 1950  Date of evaluation: 4/29/2022  History Obtained From:  patient, electronic medical record    HISTORY OF PRESENT ILLNESS:  The patient was seen by ENT and underwent excisional biopsy of a neck lymph node. Results of pathology are not available yet. I called and discussed with pathologist, Dr. Divya Rodriguez. She is concerned that these represent a high-grade lymphoma. Further studies are ongoing at [facility] oncology. Final results likely expected for the end of the week. Preliminary diagnosis is lymphoma. Discussed the patient results of prior blood work. The patient continued to have symptoms of night sweats, fatigue. He received R-CHOP about 3 weeks ago. He tolerated treatment complains of fatigue. He has been eating well. Diagnosis  · Diffuse large B-cell lymphoma, March 2022  · c-Myc, BCL6 and BCL2 rearrangement pending  · Stage IIIB    Treatment summary  · 4/8/2022- Initiated R-CHOP + GCSF every 3 weeks x 6 cycles    Hematology history  Kristel Lucia was first seen by me on 3/24/2022. The patient was referred by his primary care provider for findings of generalized lymphadenopathy. The patient has had symptoms of weight loss, night sweats and low-grade fever. This seems has been going on for many months now. Patient had Covid 19 infection last year. He had CT scans that showed generalized adenopathy. · 3/22/2022-CT soft tissue neck showed findings of cervical adenopathy identified, including a right level 3 node measuring 1.6 cm in greatest axial diameter (series 4-image 95). There is a left level 5 lymph node measuring 1.7 cm on axial image 102. Multiple enlarged left level 4 nodes, including a 2.3 cm node on axial image 114. Adenopathy extends down into the left supraclavicular fossa and there is bulky adenopathy also appreciated in the upper mediastinum.   · 3/22/2022-CT chest with contrast showed findings of mediastinal adenopathy. Subcarinal lymph nodes are present. Right hilar lymph nodes are present. Left para-aortic adenopathy is present in the abdomen. Concern for lymphoma. · 3/22/2002-CT abdomen/pelvis showed spleen is enlarged measuring 14 cm craniocaudal dimension. Bilateral renal cysts including dominant 5.4 cm RIGHT upper pole exophytic renal cyst. 3 mm nonobstructing LEFT lower pole renal calculus. Prostate is mildly enlarged measuring 4.8 cm transverse dimension. Bulky retroperitoneal lymphadenopathy is noted. Noted conglomerate in the LEFT periaortic region on image 34 series 4 measures 6.0 x 3.8 cm. Enlarged gastrohepatic ligament lymph nodes with reference node on image 23 measuring 14 mm short axis dimension. No enlarged pelvic or inguinal lymph nodes identified. Bilateral chronic L5 pars defects with grade 1 anterolisthesis of L5 on S1. No acute osseous finding. · 3/24/2022- (H), hemoglobin 10.1/MCV 93, platelets 921,619, WBC 8.7  · 3/24/2022-she was first seen by me. Recommended excisional biopsy left supraclavicular lymph node. · 3/24/2022  B2M 2.8, Uric Acid 3.6,   · 3/28/2022 Left Cervical Lymphadenopathy (BHP): The dominant nodes are seen in the left level 4  and left level 5 neck as well as in the left supraclavicular fossa and upper mediastinum. Degenerative spinal stenosis. Atheromatous calcification in the carotid bulbs. · 3/28/2022-excisional anibal biopsy. Final pathology pending. Preliminary results suggestive of high-grade lymphoma. Recommend allopurinol 300 mg p.o. daily. Recommend a PET scan and bone marrow biopsy. · 4/4/2022-IHC studies from excisional node biopsy consistent with diffuse large B-cell lymphoma, ABC phenotype. BCL2, BCL6 and c-Myc rearrangement in process. Recommended R-CHOP x6 cycles. Started on allopurinol. · 4/6/22 Bone marrow ([employer]): BONE MARROW: Normocellular (30-35% cellular) marrow with multilineage hematopoiesis. Negative for lymphoid infiltrates. Negative for dysplasia, no increase in blasts. Storage iron present, no ring sideroblasts. FLOW CYTOMETRY: No B-cell monoclonality and no T-cell aberrant antigenic expression. The blasts are not increased. CYTOGENETICS: Normal male karyotype. · 4/7/22 PET CT SKULL BASE TO MID THIGH  Extensive lymphadenopathy consistent with lymphoma. Many of these nodes are extremely metabolically active including SUV measurements of greater than 40-50 in some of the nodes. There is extensive adenopathy in the lower cervical chains including level 3, 4 and 5 nodes. Supraclavicular lymphadenopathy is present with extension into the superior mediastinum. Both anterior and middle mediastinal adenopathy as well as right hilar adenopathy is noted within the chest. A right retrocrural node, gastrohepatic ligament nodes and bulky left para-aortic nodes are also present all demonstrating intense abnormal metabolic activity.   · 4/8/2022- Initiated R-CHOP + GCSF every 3 weeks x 6 cycles      Past Medical History:    Past Medical History:   Diagnosis Date    Cancer ([address]     lymphoma    Chronic neck pain     Herniated disc, cervical     HTN (hypertension)        Past Surgical History:    Past Surgical History:   Procedure Laterality Date    LYMPH NODE BIOPSY      left side of neck    PORT SURGERY N/A 4/8/2022    SINGLE LUMEN PORT PLACEMENT WITH US & FLUORO performed by Colleen Ramirez MD at [place] [address] History:    Marital status:  Smoking status:Former smoker, Quit more 25 years ago  ETOH status:No   Resides:[place]    Family History:   Family History   Problem Relation Age of Onset    Cancer Mother         lymphoma    Heart Disease Father        Current Hospital Medications:    Current Outpatient Medications   Medication Sig Dispense Refill    polyethylene glycol (GLYCOLAX) 17 GM/SCOOP powder Take 17 g by mouth daily as needed (Constipation) 850 g 1    lidocaine-prilocaine (EMLA) 2.5-2.5 % cream Apply topically to port area and cover with plastic wrap one hour prior to treatment. 1 each 1    ondansetron (ZOFRAN) 4 MG tablet Take 1 tablet by mouth every 8 hours as needed for Nausea or Vomiting 30 tablet 1    allopurinol (ZYLOPRIM) 300 MG tablet Take 1 tablet by mouth daily 30 tablet 5    promethazine-dextromethorphan (PROMETHAZINE-DM) 6.25-15 MG/5ML syrup 1-2 tsp every hs  prn cough 120 mL 0    turmeric (QC TUMERIC COMPLEX) 500 MG CAPS Take by mouth daily      zinc gluconate 50 MG tablet Take 50 mg by mouth daily      ferrous sulfate (IRON 325) 325 (65 Fe) MG tablet Take 325 mg by mouth daily (with breakfast)      KRILL OIL PO Take by mouth      Red Yeast Rice Extract (RED YEAST RICE PO) Take by mouth      Multiple Vitamins-Minerals (PRESERVISION AREDS) TABS Take by mouth      Probiotic Product (PROBIOTIC-10 PO) Take by mouth      Zn-Pyg Afri-Nettle-Saw Palmet (SAW PALMETTO COMPLEX PO) Take by mouth      amLODIPine (NORVASC) 5 MG tablet TAKE 1 TABLET NIGHTLY 90 tablet 3    benazepril (LOTENSIN) 20 MG tablet TAKE 1 TABLET EVERY DAY 90 tablet 3    Coenzyme Q10 (CO Q 10 PO) Take by mouth      Multiple Vitamins-Minerals (THERAPEUTIC MULTIVITAMIN-MINERALS) tablet Take 1 tablet by mouth daily      Vitamin D (CHOLECALCIFEROL) 1000 UNITS CAPS capsule Take 1,000 Units by mouth daily       niacin 500 MG CR capsule Take 500 mg by mouth nightly       aspirin 81 MG tablet Take 81 mg by mouth daily       No current facility-administered medications for this visit.      Facility-Administered Medications Ordered in Other Visits   Medication Dose Route Frequency Provider Last Rate Last Admin    0.9 % sodium chloride infusion  20 mL/hr IntraVENous Once Sean Padilla MD 20 mL/hr at 04/29/22 1006 20 mL/hr at 04/29/22 1006    fosaprepitant (EMEND) 150 mg in sodium chloride 0.9 % 250 mL IVPB  150 mg IntraVENous Once Sean Padilla  mL/hr at 04/29/22 1018 150 mg at 04/29/22 1018    pegfilgrastim (NEULASTA) on-body injector 6 mg  6 mg SubCUTAneous Once Dorothy Dobbs MD        riTUXimab-pvvr (RUXIENCE) 690 mg in sodium chloride 0.9 % 500 mL chemo IVPB  375 mg/m2 (Treatment Plan Recorded) IntraVENous Once Dorothy Dobbs MD        cyclophosphamide (CYTOXAN) 1,380 mg in sodium chloride 0.9 % 250 mL chemo IVPB  750 mg/m2 (Treatment Plan Recorded) IntraVENous Once Dorothy Dobbs MD        DOXOrubicin HCl (ADRIAMYCIN) 92 mg in sodium chloride 0.9 % 100 mL chemo IVPB  50 mg/m2 (Treatment Plan Recorded) IntraVENous Once Dorothy Dobbs MD        vinCRIStine (ONCOVIN) 2 mg in sodium chloride 0.9 % 50 mL chemo IVPB  2 mg IntraVENous Once Dorothy Dobbs MD        sodium chloride flush 0.9 % injection 5-40 mL  5-40 mL IntraVENous PRN Dorothy Dobbs MD        heparin flush 100 UNIT/ML injection 500 Units  500 Units IntraCATHeter PRN Dorothy Dobbs MD           Allergies: No Known Allergies      Subjective   REVIEW OF SYSTEMS:   CONSTITUTIONAL: no fever, no night sweats, fatigue;  HEENT: no blurring of vision, no double vision, no hearing difficulty, no tinnitus, no ulceration, no dysplasia, no epistaxis;   LUNGS: no cough, no hemoptysis, no wheeze,  no shortness of breath;  CARDIOVASCULAR: no palpitation, no chest pain, no shortness of breath;  GI: no abdominal pain, no nausea, no vomiting, no diarrhea, no constipation;  KARINA: no dysuria, no hematuria, no frequency or urgency, no nephrolithiasis;  MUSCULOSKELETAL: no joint pain, no swelling, no stiffness;  ENDOCRINE: no polyuria, no polydipsia, no cold or heat intolerance;  HEMATOLOGY: no easy bruising or bleeding, no history of clotting disorder;  DERMATOLOGY: no skin rash, no eczema, no pruritus;  PSYCHIATRY: no depression, no anxiety, no panic attacks, no suicidal ideation, no homicidal ideation;  NEUROLOGY: no syncope, no seizures, no numbness or tingling of hands, no numbness or tingling of feet, no paresis;  PSYCH: mood and affect appropriate. Alert and oriented to time, place, person     Objective   BP (!) 173/74 (Site: Left Upper Arm, Position: Supine)   Pulse 93   Temp 97.5 °F (36.4 °C) (Oral)   Resp 18   Ht 5' 7\" (1.702 m)   Wt 153 lb (69.4 kg)   SpO2 96%   BMI 23.96 kg/m²     PHYSICAL EXAM:  CONSTITUTIONAL: Alert, appropriate, no acute distress  EYES: Non icteric, EOM intact, pupils equal round   ENT: Mucus membranes moist, no oral pharyngeal lesions, external inspection of ears and nose are normal.  NECK: Supple, no masses. No palpable thyroid mass  CHEST/LUNGS: CTA bilaterally, normal respiratory effort   CARDIOVASCULAR: RRR, no murmurs. No lower extremity edema  ABDOMEN: soft non-tender, active bowel sounds, no HSM. No palpable masses  EXTREMITIES: warm, full ROM in all 4 extremities, no focal weakness. SKIN: warm, dry with no rashes or lesions  LYMPH: No cervical, clavicular, axillary, or inguinal lymphadenopathy  NEUROLOGIC: follows commands, non focal     LABORATORY RESULTS REVIEWED/ANALYZED BY ME:  4/6/22 Bone marrow ([employer]):  BONE MARROW: Normocellular (30-35% cellular) marrow with multilineage hematopoiesis. -Negative for lymphoid infiltrates  -Negative for dysplasia, no increase in blasts. -Storage iron present, no ring sideroblasts. FLOW CYTOMETRY: No B-cell monoclonality and no T-cell aberrant antigenic expression. The blasts are not increased. CYTOGENETICS: Normal male karyotype.      Lab Results   Component Value Date    WBC 9.94 (H) 04/29/2022    HGB 11.3 (L) 04/29/2022    HCT 35.7 (L) 04/29/2022    MCV 93.5 (H) 04/29/2022     04/29/2022     Lab Results   Component Value Date    NEUTROABS 8.09 (H) 04/29/2022     Lab Results   Component Value Date     04/29/2022    K 4.1 04/29/2022     04/29/2022    CO2 26 04/29/2022    BUN 14 04/29/2022    CREATININE 0.7 04/29/2022    GLUCOSE 107 (H) 04/29/2022    CALCIUM 8.6 04/29/2022    PROT 7.4 04/29/2022 LABALBU 3.7 04/29/2022    BILITOT 0.4 04/29/2022    ALKPHOS 99 04/29/2022    AST 27 04/29/2022    ALT 16 (L) 04/29/2022    LABGLOM >60 04/29/2022    GFRAA >59 02/24/2022    AGRATIO 1.5 09/19/2016    GLOB 3.7 04/29/2022       RADIOLOGY STUDIES REPORT/REVIEWED AND INTERPRETED BY ME:  PET CT SKULL BASE TO MID THIGH    Result Date: 4/7/2022  1. Extensive lymphadenopathy consistent with lymphoma. Many of these nodes are extremely metabolically active including SUV measurements of greater than 40-50 in some of the nodes. There is extensive adenopathy in the lower cervical chains including level 3, 4 and 5 nodes. Supraclavicular lymphadenopathy is present with extension into the superior mediastinum. Both anterior and middle mediastinal adenopathy as well as right hilar adenopathy is noted within the chest. A right retrocrural node, gastrohepatic ligament nodes and bulky left para-aortic nodes are also present all demonstrating intense abnormal metabolic activity. . Signed by Dr Marisabel Dowell:  #Diffuse large B-cell lymphoma, stage IIIB, ABC phenotype (c-Myc(-), BCL-2 (+), BCL6(-)rearrangement)  -3/28/2022-excisional node biopsy by Dr. Alexa Fry, [facility].  -Path consistent with ABC phenotype DLBCL: Lymphoma as per Dr. Tanika Hall. Awaiting final IHC studies from [facility]. -PET scan and bone marrow biopsy-ordered today on 4/4/2022.  -Persistent B symptoms.  - PET scan/bone marrow biopsy on 4/4/2022. (Negative for lymphoma involvement)  Extensive lymphadenopathy consistent with lymphoma. Many of these nodes are extremely metabolically active including SUV measurements of greater than 40-50 in some of the nodes. There is extensive adenopathy in the lower cervical chains including level 3, 4 and 5 nodes. Supraclavicular lymphadenopathy is present with extension into the superior mediastinum.  Both anterior and middle mediastinal adenopathy as well as right hilar adenopathy is noted within the chest. A right retrocrural node, gastrohepatic ligament nodes and bulky left para-aortic nodes are also present all demonstrating intense abnormal metabolic activity. .      Recommended:  -R-CHOP x6 cycles  G-CSF support    Proceed cycle #2 R-CHOP    Pulmonary interstitial fibrosis-I reviewed the CT scan with the patient. He has complaints of short of breath on exertion. He has findings of interstitial fibrosis. I offered a pulmonology consultation but he wants to defer this at this time. PLAN:  · RTC with MD in treatment room 3 weeks   · C#2/6 R-CHOP + GCSF today and every 3 weeks  · Recommend 2D echo-1st available  · Repeat CT scans after C3 3  · Continue Allopurinol 300mg daily  · Consider Pulmonology referral in the future. · 2D echo for baseline evaluation    Carlos Enrique YANEZ am pre-charting as a registered nurse for Armida Carvajal MD. Electronically signed by Carlos Enrique La RN on 4/29/2022 at 4:55 PM CDT. Patric Salas am scribing for Armida Carvajal MD. Electronically signed by Carlos Enrique La RN on 4/29/2022 at 9:50 AM CDT. I, Dr Krzysztof Valdez, personally performed the services described in this documentation as scribed by Carlos Enrique La RN in my presence and is both accurate and complete. I have seen, examined and reviewed this patient medication list, appropriate labs and imaging studies. I reviewed relevant medical records and others physicians notes. I discussed the plans of care with the patient. I answered all the questions to the patients satisfaction. I have also reviewed the chief complaint (CC) and part of the history (History of Present Illness (HPI), Past Family Social History [facility]), or Review of Systems (ROS) and made changes when appropriated. (Please note that portions of this note were completed with a voice recognition program. Efforts were made to edit the dictations but occasionally words are mis-transcribed. )Electronically signed by Armida Carvajal MD on 4/29/2022 at 10:40 AM

## 2022-04-29 ENCOUNTER — APPOINTMENT (OUTPATIENT)
Dept: INFUSION THERAPY | Age: 72
End: 2022-04-29
Payer: MEDICARE

## 2022-04-29 ENCOUNTER — HOSPITAL ENCOUNTER (OUTPATIENT)
Dept: INFUSION THERAPY | Age: 72
Discharge: HOME | End: 2022-04-29
Payer: MEDICARE

## 2022-04-29 ENCOUNTER — OFFICE VISIT (OUTPATIENT)
Dept: HEMATOLOGY | Age: 72
End: 2022-04-29
Payer: MEDICARE

## 2022-04-29 VITALS
OXYGEN SATURATION: 96 % | WEIGHT: 153 LBS | TEMPERATURE: 97.5 F | RESPIRATION RATE: 18 BRPM | BODY MASS INDEX: 24.01 KG/M2 | SYSTOLIC BLOOD PRESSURE: 173 MMHG | HEIGHT: 67 IN | DIASTOLIC BLOOD PRESSURE: 74 MMHG | HEART RATE: 93 BPM

## 2022-04-29 DIAGNOSIS — T45.1X5D ADVERSE EFFECT OF CHEMOTHERAPY, SUBSEQUENT ENCOUNTER: ICD-10-CM

## 2022-04-29 DIAGNOSIS — T45.1X5A ANTINEOPLASTIC CHEMOTHERAPY INDUCED ANEMIA: ICD-10-CM

## 2022-04-29 DIAGNOSIS — Z01.818 EXAMINATION PRIOR TO CHEMOTHERAPY: ICD-10-CM

## 2022-04-29 DIAGNOSIS — Z71.89 CARE PLAN DISCUSSED WITH PATIENT: ICD-10-CM

## 2022-04-29 DIAGNOSIS — C83.30 DIFFUSE LARGE B-CELL LYMPHOMA, UNSPECIFIED BODY REGION (HCC): Primary | ICD-10-CM

## 2022-04-29 DIAGNOSIS — C85.10 LARGE B-CELL LYMPHOMA (HCC): ICD-10-CM

## 2022-04-29 DIAGNOSIS — C85.98 LYMPHOMA OF LYMPH NODES OF MULTIPLE SITES (HCC): ICD-10-CM

## 2022-04-29 DIAGNOSIS — Z51.11 CHEMOTHERAPY MANAGEMENT, ENCOUNTER FOR: ICD-10-CM

## 2022-04-29 DIAGNOSIS — D64.81 ANTINEOPLASTIC CHEMOTHERAPY INDUCED ANEMIA: ICD-10-CM

## 2022-04-29 DIAGNOSIS — Z51.12 ENCOUNTER FOR ANTINEOPLASTIC IMMUNOTHERAPY: ICD-10-CM

## 2022-04-29 DIAGNOSIS — Z91.89 AT RISK FOR CARDIAC COMPLICATION: ICD-10-CM

## 2022-04-29 LAB
ALBUMIN SERPL-MCNC: 3.7 G/DL (ref 3.5–5.2)
ALP BLD-CCNC: 99 U/L (ref 40–130)
ALT SERPL-CCNC: 16 U/L (ref 21–72)
ANION GAP SERPL CALCULATED.3IONS-SCNC: 7 MMOL/L (ref 7–19)
AST SERPL-CCNC: 27 U/L (ref 17–59)
BASOPHILS ABSOLUTE: 0.17 K/UL (ref 0.01–0.08)
BASOPHILS RELATIVE PERCENT: 1.7 % (ref 0.1–1.2)
BILIRUB SERPL-MCNC: 0.4 MG/DL (ref 0.2–1.3)
BUN BLDV-MCNC: 14 MG/DL (ref 9–20)
CALCIUM SERPL-MCNC: 8.6 MG/DL (ref 8.4–10.2)
CHLORIDE BLD-SCNC: 107 MMOL/L (ref 98–111)
CO2: 26 MMOL/L (ref 22–29)
CREAT SERPL-MCNC: 0.7 MG/DL (ref 0.6–1.2)
EOSINOPHILS ABSOLUTE: 0.06 K/UL (ref 0.04–0.54)
EOSINOPHILS RELATIVE PERCENT: 0.6 % (ref 0.7–7)
GFR NON-AFRICAN AMERICAN: >60
GLOBULIN: 3.7 G/DL
GLUCOSE BLD-MCNC: 107 MG/DL (ref 74–106)
HCT VFR BLD CALC: 35.7 % (ref 40.1–51)
HEMOGLOBIN: 11.3 G/DL (ref 13.7–17.5)
LYMPHOCYTES ABSOLUTE: 0.7 K/UL (ref 1.18–3.74)
LYMPHOCYTES RELATIVE PERCENT: 7 % (ref 19.3–53.1)
MCH RBC QN AUTO: 29.6 PG (ref 25.7–32.2)
MCHC RBC AUTO-ENTMCNC: 31.7 G/DL (ref 32.3–36.5)
MCV RBC AUTO: 93.5 FL (ref 79–92.2)
MONOCYTES ABSOLUTE: 0.85 K/UL (ref 0.24–0.82)
MONOCYTES RELATIVE PERCENT: 8.6 % (ref 4.7–12.5)
NEUTROPHILS ABSOLUTE: 8.09 K/UL (ref 1.56–6.13)
NEUTROPHILS RELATIVE PERCENT: 81.4 % (ref 34–71.1)
PDW BLD-RTO: 19.9 % (ref 11.6–14.4)
PLATELET # BLD: 316 K/UL (ref 163–337)
PMV BLD AUTO: 9.3 FL (ref 7.4–10.4)
POTASSIUM SERPL-SCNC: 4.1 MMOL/L (ref 3.5–5.1)
RBC # BLD: 3.82 M/UL (ref 4.63–6.08)
SODIUM BLD-SCNC: 140 MMOL/L (ref 137–145)
TOTAL PROTEIN: 7.4 G/DL (ref 6.3–8.2)
WBC # BLD: 9.94 K/UL (ref 4.23–9.07)

## 2022-04-29 PROCEDURE — G8420 CALC BMI NORM PARAMETERS: HCPCS | Performed by: INTERNAL MEDICINE

## 2022-04-29 PROCEDURE — 6360000002 HC RX W HCPCS: Performed by: INTERNAL MEDICINE

## 2022-04-29 PROCEDURE — 85025 COMPLETE CBC W/AUTO DIFF WBC: CPT

## 2022-04-29 PROCEDURE — 96413 CHEMO IV INFUSION 1 HR: CPT

## 2022-04-29 PROCEDURE — 99214 OFFICE O/P EST MOD 30 MIN: CPT | Performed by: INTERNAL MEDICINE

## 2022-04-29 PROCEDURE — 96377 APPLICATON ON-BODY INJECTOR: CPT

## 2022-04-29 PROCEDURE — 2580000003 HC RX 258: Performed by: INTERNAL MEDICINE

## 2022-04-29 PROCEDURE — 96415 CHEMO IV INFUSION ADDL HR: CPT

## 2022-04-29 PROCEDURE — 1123F ACP DISCUSS/DSCN MKR DOCD: CPT | Performed by: INTERNAL MEDICINE

## 2022-04-29 PROCEDURE — 80053 COMPREHEN METABOLIC PANEL: CPT

## 2022-04-29 PROCEDURE — G8427 DOCREV CUR MEDS BY ELIG CLIN: HCPCS | Performed by: INTERNAL MEDICINE

## 2022-04-29 PROCEDURE — 96375 TX/PRO/DX INJ NEW DRUG ADDON: CPT

## 2022-04-29 PROCEDURE — 96417 CHEMO IV INFUS EACH ADDL SEQ: CPT

## 2022-04-29 PROCEDURE — 1036F TOBACCO NON-USER: CPT | Performed by: INTERNAL MEDICINE

## 2022-04-29 PROCEDURE — 4040F PNEUMOC VAC/ADMIN/RCVD: CPT | Performed by: INTERNAL MEDICINE

## 2022-04-29 PROCEDURE — 6370000000 HC RX 637 (ALT 250 FOR IP): Performed by: INTERNAL MEDICINE

## 2022-04-29 PROCEDURE — 96367 TX/PROPH/DG ADDL SEQ IV INF: CPT

## 2022-04-29 PROCEDURE — 3017F COLORECTAL CA SCREEN DOC REV: CPT | Performed by: INTERNAL MEDICINE

## 2022-04-29 RX ORDER — DIPHENHYDRAMINE HYDROCHLORIDE 50 MG/ML
50 INJECTION INTRAMUSCULAR; INTRAVENOUS
Status: CANCELLED | OUTPATIENT
Start: 2022-04-29

## 2022-04-29 RX ORDER — HEPARIN SODIUM (PORCINE) LOCK FLUSH IV SOLN 100 UNIT/ML 100 UNIT/ML
500 SOLUTION INTRAVENOUS PRN
Status: DISCONTINUED | OUTPATIENT
Start: 2022-04-29 | End: 2022-04-30 | Stop reason: HOSPADM

## 2022-04-29 RX ORDER — PALONOSETRON 0.05 MG/ML
0.25 INJECTION, SOLUTION INTRAVENOUS ONCE
Status: CANCELLED | OUTPATIENT
Start: 2022-04-29 | End: 2022-04-29

## 2022-04-29 RX ORDER — SODIUM CHLORIDE 0.9 % (FLUSH) 0.9 %
5-40 SYRINGE (ML) INJECTION PRN
Status: DISCONTINUED | OUTPATIENT
Start: 2022-04-29 | End: 2022-04-30 | Stop reason: HOSPADM

## 2022-04-29 RX ORDER — ACETAMINOPHEN 325 MG/1
650 TABLET ORAL
Status: CANCELLED | OUTPATIENT
Start: 2022-04-29

## 2022-04-29 RX ORDER — DIPHENHYDRAMINE HYDROCHLORIDE 50 MG/ML
25 INJECTION INTRAMUSCULAR; INTRAVENOUS ONCE
Status: CANCELLED | OUTPATIENT
Start: 2022-04-29

## 2022-04-29 RX ORDER — ONDANSETRON 2 MG/ML
8 INJECTION INTRAMUSCULAR; INTRAVENOUS
Status: CANCELLED | OUTPATIENT
Start: 2022-04-29

## 2022-04-29 RX ORDER — SODIUM CHLORIDE 9 MG/ML
20 INJECTION, SOLUTION INTRAVENOUS ONCE
Status: CANCELLED | OUTPATIENT
Start: 2022-04-29 | End: 2022-04-29

## 2022-04-29 RX ORDER — SODIUM CHLORIDE 9 MG/ML
INJECTION, SOLUTION INTRAVENOUS CONTINUOUS
Status: CANCELLED | OUTPATIENT
Start: 2022-04-29

## 2022-04-29 RX ORDER — ACETAMINOPHEN 500 MG
1000 TABLET ORAL ONCE
Status: COMPLETED | OUTPATIENT
Start: 2022-04-29 | End: 2022-04-29

## 2022-04-29 RX ORDER — SODIUM CHLORIDE 9 MG/ML
5-40 INJECTION INTRAVENOUS PRN
Status: CANCELLED | OUTPATIENT
Start: 2022-04-29

## 2022-04-29 RX ORDER — HEPARIN SODIUM (PORCINE) LOCK FLUSH IV SOLN 100 UNIT/ML 100 UNIT/ML
500 SOLUTION INTRAVENOUS PRN
Status: CANCELLED | OUTPATIENT
Start: 2022-04-29

## 2022-04-29 RX ORDER — SODIUM CHLORIDE 9 MG/ML
20 INJECTION, SOLUTION INTRAVENOUS ONCE
Status: COMPLETED | OUTPATIENT
Start: 2022-04-29 | End: 2022-04-30

## 2022-04-29 RX ORDER — ALBUTEROL SULFATE 90 UG/1
4 AEROSOL, METERED RESPIRATORY (INHALATION) PRN
Status: CANCELLED | OUTPATIENT
Start: 2022-04-29

## 2022-04-29 RX ORDER — DIPHENHYDRAMINE HYDROCHLORIDE 50 MG/ML
25 INJECTION INTRAMUSCULAR; INTRAVENOUS ONCE
Status: COMPLETED | OUTPATIENT
Start: 2022-04-29 | End: 2022-04-29

## 2022-04-29 RX ORDER — DEXAMETHASONE SODIUM PHOSPHATE 10 MG/ML
10 INJECTION, SOLUTION INTRAMUSCULAR; INTRAVENOUS ONCE
Status: COMPLETED | OUTPATIENT
Start: 2022-04-29 | End: 2022-04-29

## 2022-04-29 RX ORDER — SODIUM CHLORIDE 9 MG/ML
25 INJECTION, SOLUTION INTRAVENOUS PRN
Status: CANCELLED | OUTPATIENT
Start: 2022-04-29

## 2022-04-29 RX ORDER — MEPERIDINE HYDROCHLORIDE 50 MG/ML
12.5 INJECTION INTRAMUSCULAR; INTRAVENOUS; SUBCUTANEOUS PRN
Status: CANCELLED | OUTPATIENT
Start: 2022-04-29

## 2022-04-29 RX ORDER — SODIUM CHLORIDE 0.9 % (FLUSH) 0.9 %
5-40 SYRINGE (ML) INJECTION PRN
Status: CANCELLED | OUTPATIENT
Start: 2022-04-29

## 2022-04-29 RX ORDER — EPINEPHRINE 1 MG/ML
0.3 INJECTION, SOLUTION, CONCENTRATE INTRAVENOUS PRN
Status: CANCELLED | OUTPATIENT
Start: 2022-04-29

## 2022-04-29 RX ORDER — FAMOTIDINE 10 MG/ML
20 INJECTION, SOLUTION INTRAVENOUS
Status: CANCELLED | OUTPATIENT
Start: 2022-04-29

## 2022-04-29 RX ORDER — ACETAMINOPHEN 325 MG/1
1000 TABLET ORAL ONCE
Status: CANCELLED | OUTPATIENT
Start: 2022-04-29

## 2022-04-29 RX ORDER — PALONOSETRON 0.05 MG/ML
0.25 INJECTION, SOLUTION INTRAVENOUS ONCE
Status: COMPLETED | OUTPATIENT
Start: 2022-04-29 | End: 2022-04-29

## 2022-04-29 RX ADMIN — DEXAMETHASONE SODIUM PHOSPHATE 10 MG: 10 INJECTION, SOLUTION INTRAMUSCULAR; INTRAVENOUS at 10:05

## 2022-04-29 RX ADMIN — ACETAMINOPHEN 1000 MG: 500 TABLET ORAL at 10:04

## 2022-04-29 RX ADMIN — PEGFILGRASTIM 6 MG: KIT SUBCUTANEOUS at 14:38

## 2022-04-29 RX ADMIN — FOSAPREPITANT 150 MG: 150 INJECTION, POWDER, LYOPHILIZED, FOR SOLUTION INTRAVENOUS at 10:18

## 2022-04-29 RX ADMIN — SODIUM CHLORIDE 92 MG: 9 INJECTION, SOLUTION INTRAVENOUS at 13:16

## 2022-04-29 RX ADMIN — SODIUM CHLORIDE 690 MG: 9 INJECTION, SOLUTION INTRAVENOUS at 10:55

## 2022-04-29 RX ADMIN — DIPHENHYDRAMINE HYDROCHLORIDE 25 MG: 50 INJECTION, SOLUTION INTRAMUSCULAR; INTRAVENOUS at 10:05

## 2022-04-29 RX ADMIN — SODIUM CHLORIDE 20 ML/HR: 9 INJECTION, SOLUTION INTRAVENOUS at 10:06

## 2022-04-29 RX ADMIN — CYCLOPHOSPHAMIDE 1380 MG: 200 INJECTION, SOLUTION INTRAVENOUS at 12:37

## 2022-04-29 RX ADMIN — VINCRISTINE SULFATE 2 MG: 1 INJECTION, SOLUTION INTRAVENOUS at 13:50

## 2022-04-29 RX ADMIN — PALONOSETRON HYDROCHLORIDE 0.25 MG: 0.25 INJECTION, SOLUTION INTRAVENOUS at 10:05

## 2022-05-12 ENCOUNTER — HOSPITAL ENCOUNTER (OUTPATIENT)
Dept: NON INVASIVE DIAGNOSTICS | Age: 72
Discharge: HOME | End: 2022-05-12
Payer: MEDICARE

## 2022-05-12 DIAGNOSIS — C83.30 DIFFUSE LARGE B-CELL LYMPHOMA, UNSPECIFIED BODY REGION (HCC): ICD-10-CM

## 2022-05-12 DIAGNOSIS — Z01.818 EXAMINATION PRIOR TO CHEMOTHERAPY: ICD-10-CM

## 2022-05-12 DIAGNOSIS — Z91.89 AT RISK FOR CARDIAC COMPLICATION: ICD-10-CM

## 2022-05-12 LAB
LV EF: 54 %
LVEF MODALITY: NORMAL

## 2022-05-12 PROCEDURE — 6360000004 HC RX CONTRAST MEDICATION: Performed by: INTERNAL MEDICINE

## 2022-05-12 PROCEDURE — C8929 TTE W OR WO FOL WCON,DOPPLER: HCPCS

## 2022-05-12 PROCEDURE — 93356 MYOCRD STRAIN IMG SPCKL TRCK: CPT

## 2022-05-12 RX ADMIN — PERFLUTREN 1.5 ML: 6.52 INJECTION, SUSPENSION INTRAVENOUS at 13:09

## 2022-05-16 ENCOUNTER — HOSPITAL ENCOUNTER (INPATIENT)
Age: 72
LOS: 2 days | Discharge: HOME | DRG: 309 | End: 2022-05-18
Attending: EMERGENCY MEDICINE | Admitting: INTERNAL MEDICINE
Payer: MEDICARE

## 2022-05-16 ENCOUNTER — APPOINTMENT (OUTPATIENT)
Dept: GENERAL RADIOLOGY | Age: 72
DRG: 309 | End: 2022-05-16
Payer: MEDICARE

## 2022-05-16 DIAGNOSIS — I48.91 ATRIAL FIBRILLATION WITH RVR (HCC): Primary | ICD-10-CM

## 2022-05-16 PROBLEM — I48.92 ATRIAL FLUTTER WITH RAPID VENTRICULAR RESPONSE (HCC): Status: ACTIVE | Noted: 2022-05-16

## 2022-05-16 LAB
ALBUMIN SERPL-MCNC: 3.8 G/DL (ref 3.5–5.2)
ALP BLD-CCNC: 100 U/L (ref 40–130)
ALT SERPL-CCNC: 11 U/L (ref 5–41)
ANION GAP SERPL CALCULATED.3IONS-SCNC: 13 MMOL/L (ref 7–19)
AST SERPL-CCNC: 17 U/L (ref 5–40)
BASOPHILS ABSOLUTE: 0.1 K/UL (ref 0–0.2)
BASOPHILS RELATIVE PERCENT: 0.9 % (ref 0–1)
BILIRUB SERPL-MCNC: <0.2 MG/DL (ref 0.2–1.2)
BILIRUBIN URINE: NEGATIVE
BLOOD, URINE: NEGATIVE
BUN BLDV-MCNC: 21 MG/DL (ref 8–23)
CALCIUM SERPL-MCNC: 8.9 MG/DL (ref 8.8–10.2)
CHLORIDE BLD-SCNC: 104 MMOL/L (ref 98–111)
CLARITY: CLEAR
CO2: 23 MMOL/L (ref 22–29)
COLOR: YELLOW
CREAT SERPL-MCNC: 0.6 MG/DL (ref 0.5–1.2)
EOSINOPHILS ABSOLUTE: 0.1 K/UL (ref 0–0.6)
EOSINOPHILS RELATIVE PERCENT: 0.7 % (ref 0–5)
GFR AFRICAN AMERICAN: >59
GFR NON-AFRICAN AMERICAN: >60
GLUCOSE BLD-MCNC: 116 MG/DL (ref 74–109)
GLUCOSE URINE: NEGATIVE MG/DL
HCT VFR BLD CALC: 41.7 % (ref 42–52)
HEMOGLOBIN: 13.3 G/DL (ref 14–18)
IMMATURE GRANULOCYTES #: 0.2 K/UL
KETONES, URINE: NEGATIVE MG/DL
LACTIC ACID: 0.7 MMOL/L (ref 0.5–1.9)
LEUKOCYTE ESTERASE, URINE: NEGATIVE
LYMPHOCYTES ABSOLUTE: 1.4 K/UL (ref 1.1–4.5)
LYMPHOCYTES RELATIVE PERCENT: 10.4 % (ref 20–40)
MCH RBC QN AUTO: 30.4 PG (ref 27–31)
MCHC RBC AUTO-ENTMCNC: 31.9 G/DL (ref 33–37)
MCV RBC AUTO: 95.4 FL (ref 80–94)
MONOCYTES ABSOLUTE: 1.5 K/UL (ref 0–0.9)
MONOCYTES RELATIVE PERCENT: 10.6 % (ref 0–10)
NEUTROPHILS ABSOLUTE: 10.5 K/UL (ref 1.5–7.5)
NEUTROPHILS RELATIVE PERCENT: 76.3 % (ref 50–65)
NITRITE, URINE: NEGATIVE
PDW BLD-RTO: 20.7 % (ref 11.5–14.5)
PH UA: 6 (ref 5–8)
PLATELET # BLD: 275 K/UL (ref 130–400)
PMV BLD AUTO: 10.5 FL (ref 9.4–12.4)
POTASSIUM REFLEX MAGNESIUM: 4.1 MMOL/L (ref 3.5–5)
PRO-BNP: 598 PG/ML (ref 0–900)
PROTEIN UA: NEGATIVE MG/DL
RBC # BLD: 4.37 M/UL (ref 4.7–6.1)
SARS-COV-2, NAAT: NOT DETECTED
SODIUM BLD-SCNC: 140 MMOL/L (ref 136–145)
SPECIFIC GRAVITY UA: 1.01 (ref 1–1.03)
TOTAL PROTEIN: 6.7 G/DL (ref 6.6–8.7)
TROPONIN: 0.01 NG/ML (ref 0–0.03)
TROPONIN: <0.01 NG/ML (ref 0–0.03)
TSH REFLEX FT4: 1.95 UIU/ML (ref 0.35–5.5)
UROBILINOGEN, URINE: 1 E.U./DL
WBC # BLD: 13.8 K/UL (ref 4.8–10.8)

## 2022-05-16 PROCEDURE — 84484 ASSAY OF TROPONIN QUANT: CPT

## 2022-05-16 PROCEDURE — 80053 COMPREHEN METABOLIC PANEL: CPT

## 2022-05-16 PROCEDURE — 83880 ASSAY OF NATRIURETIC PEPTIDE: CPT

## 2022-05-16 PROCEDURE — 71045 X-RAY EXAM CHEST 1 VIEW: CPT

## 2022-05-16 PROCEDURE — 99221 1ST HOSP IP/OBS SF/LOW 40: CPT | Performed by: INTERNAL MEDICINE

## 2022-05-16 PROCEDURE — 2580000003 HC RX 258: Performed by: EMERGENCY MEDICINE

## 2022-05-16 PROCEDURE — 6370000000 HC RX 637 (ALT 250 FOR IP)

## 2022-05-16 PROCEDURE — 81003 URINALYSIS AUTO W/O SCOPE: CPT

## 2022-05-16 PROCEDURE — 85025 COMPLETE CBC W/AUTO DIFF WBC: CPT

## 2022-05-16 PROCEDURE — 93005 ELECTROCARDIOGRAM TRACING: CPT | Performed by: INTERNAL MEDICINE

## 2022-05-16 PROCEDURE — 2140000000 HC CCU INTERMEDIATE R&B

## 2022-05-16 PROCEDURE — 96361 HYDRATE IV INFUSION ADD-ON: CPT

## 2022-05-16 PROCEDURE — 84443 ASSAY THYROID STIM HORMONE: CPT

## 2022-05-16 PROCEDURE — 87635 SARS-COV-2 COVID-19 AMP PRB: CPT

## 2022-05-16 PROCEDURE — 96365 THER/PROPH/DIAG IV INF INIT: CPT

## 2022-05-16 PROCEDURE — 83605 ASSAY OF LACTIC ACID: CPT

## 2022-05-16 PROCEDURE — 96376 TX/PRO/DX INJ SAME DRUG ADON: CPT

## 2022-05-16 PROCEDURE — 36415 COLL VENOUS BLD VENIPUNCTURE: CPT

## 2022-05-16 PROCEDURE — 99285 EMERGENCY DEPT VISIT HI MDM: CPT

## 2022-05-16 PROCEDURE — 2500000003 HC RX 250 WO HCPCS: Performed by: EMERGENCY MEDICINE

## 2022-05-16 PROCEDURE — 6360000002 HC RX W HCPCS: Performed by: STUDENT IN AN ORGANIZED HEALTH CARE EDUCATION/TRAINING PROGRAM

## 2022-05-16 PROCEDURE — 6360000002 HC RX W HCPCS: Performed by: INTERNAL MEDICINE

## 2022-05-16 PROCEDURE — 2580000003 HC RX 258: Performed by: STUDENT IN AN ORGANIZED HEALTH CARE EDUCATION/TRAINING PROGRAM

## 2022-05-16 RX ORDER — ZINC SULFATE 50(220)MG
50 CAPSULE ORAL DAILY
Status: DISCONTINUED | OUTPATIENT
Start: 2022-05-16 | End: 2022-05-18 | Stop reason: HOSPADM

## 2022-05-16 RX ORDER — SODIUM CHLORIDE 9 MG/ML
INJECTION, SOLUTION INTRAVENOUS CONTINUOUS
Status: DISCONTINUED | OUTPATIENT
Start: 2022-05-16 | End: 2022-05-18 | Stop reason: HOSPADM

## 2022-05-16 RX ORDER — 0.9 % SODIUM CHLORIDE 0.9 %
1000 INTRAVENOUS SOLUTION INTRAVENOUS ONCE
Status: COMPLETED | OUTPATIENT
Start: 2022-05-16 | End: 2022-05-16

## 2022-05-16 RX ORDER — DILTIAZEM HCL IN NACL,ISO-OSM 125 MG/125
2.5-15 PLASTIC BAG, INJECTION (ML) INTRAVENOUS CONTINUOUS
Status: DISCONTINUED | OUTPATIENT
Start: 2022-05-16 | End: 2022-05-18 | Stop reason: HOSPADM

## 2022-05-16 RX ORDER — M-VIT,TX,IRON,MINS/CALC/FOLIC 27MG-0.4MG
1 TABLET ORAL DAILY
Status: DISCONTINUED | OUTPATIENT
Start: 2022-05-16 | End: 2022-05-16 | Stop reason: SDUPTHER

## 2022-05-16 RX ORDER — NIACIN 500 MG/1
500 TABLET, EXTENDED RELEASE ORAL NIGHTLY
Status: DISCONTINUED | OUTPATIENT
Start: 2022-05-16 | End: 2022-05-18 | Stop reason: HOSPADM

## 2022-05-16 RX ORDER — ONDANSETRON 2 MG/ML
4 INJECTION INTRAMUSCULAR; INTRAVENOUS EVERY 6 HOURS PRN
Status: DISCONTINUED | OUTPATIENT
Start: 2022-05-16 | End: 2022-05-18 | Stop reason: HOSPADM

## 2022-05-16 RX ORDER — ASPIRIN 81 MG/1
81 TABLET, CHEWABLE ORAL DAILY
Status: DISCONTINUED | OUTPATIENT
Start: 2022-05-16 | End: 2022-05-18 | Stop reason: HOSPADM

## 2022-05-16 RX ORDER — LISINOPRIL 20 MG/1
20 TABLET ORAL DAILY
Refills: 3 | Status: DISCONTINUED | OUTPATIENT
Start: 2022-05-16 | End: 2022-05-18 | Stop reason: HOSPADM

## 2022-05-16 RX ORDER — SODIUM CHLORIDE 9 MG/ML
INJECTION, SOLUTION INTRAVENOUS PRN
Status: DISCONTINUED | OUTPATIENT
Start: 2022-05-16 | End: 2022-05-18 | Stop reason: HOSPADM

## 2022-05-16 RX ORDER — ONDANSETRON 4 MG/1
4 TABLET, ORALLY DISINTEGRATING ORAL EVERY 8 HOURS PRN
Status: DISCONTINUED | OUTPATIENT
Start: 2022-05-16 | End: 2022-05-18 | Stop reason: HOSPADM

## 2022-05-16 RX ORDER — ALLOPURINOL 300 MG/1
300 TABLET ORAL DAILY
Status: DISCONTINUED | OUTPATIENT
Start: 2022-05-16 | End: 2022-05-18 | Stop reason: HOSPADM

## 2022-05-16 RX ORDER — ACETAMINOPHEN 650 MG/1
650 SUPPOSITORY RECTAL EVERY 6 HOURS PRN
Status: DISCONTINUED | OUTPATIENT
Start: 2022-05-16 | End: 2022-05-18 | Stop reason: HOSPADM

## 2022-05-16 RX ORDER — VITAMIN B COMPLEX
1000 TABLET ORAL DAILY
Status: DISCONTINUED | OUTPATIENT
Start: 2022-05-16 | End: 2022-05-18 | Stop reason: HOSPADM

## 2022-05-16 RX ORDER — POLYETHYLENE GLYCOL 3350 17 G/17G
17 POWDER, FOR SOLUTION ORAL DAILY PRN
Status: DISCONTINUED | OUTPATIENT
Start: 2022-05-16 | End: 2022-05-18 | Stop reason: HOSPADM

## 2022-05-16 RX ORDER — ACETAMINOPHEN 325 MG/1
650 TABLET ORAL EVERY 6 HOURS PRN
Status: DISCONTINUED | OUTPATIENT
Start: 2022-05-16 | End: 2022-05-18 | Stop reason: HOSPADM

## 2022-05-16 RX ORDER — DILTIAZEM HYDROCHLORIDE 5 MG/ML
0.25 INJECTION INTRAVENOUS ONCE
Status: COMPLETED | OUTPATIENT
Start: 2022-05-16 | End: 2022-05-16

## 2022-05-16 RX ORDER — FERROUS SULFATE 325(65) MG
325 TABLET ORAL
Status: DISCONTINUED | OUTPATIENT
Start: 2022-05-17 | End: 2022-05-18 | Stop reason: HOSPADM

## 2022-05-16 RX ORDER — AMLODIPINE BESYLATE 5 MG/1
5 TABLET ORAL NIGHTLY
Status: DISCONTINUED | OUTPATIENT
Start: 2022-05-16 | End: 2022-05-17

## 2022-05-16 RX ORDER — M-VIT,TX,IRON,MINS/CALC/FOLIC 27MG-0.4MG
1 TABLET ORAL DAILY
Status: DISCONTINUED | OUTPATIENT
Start: 2022-05-16 | End: 2022-05-18 | Stop reason: HOSPADM

## 2022-05-16 RX ORDER — SODIUM CHLORIDE 0.9 % (FLUSH) 0.9 %
5-40 SYRINGE (ML) INJECTION PRN
Status: DISCONTINUED | OUTPATIENT
Start: 2022-05-16 | End: 2022-05-18 | Stop reason: HOSPADM

## 2022-05-16 RX ORDER — ENOXAPARIN SODIUM 100 MG/ML
1 INJECTION SUBCUTANEOUS 2 TIMES DAILY
Status: DISCONTINUED | OUTPATIENT
Start: 2022-05-16 | End: 2022-05-18

## 2022-05-16 RX ORDER — SODIUM CHLORIDE 0.9 % (FLUSH) 0.9 %
5-40 SYRINGE (ML) INJECTION EVERY 12 HOURS SCHEDULED
Status: DISCONTINUED | OUTPATIENT
Start: 2022-05-16 | End: 2022-05-18 | Stop reason: HOSPADM

## 2022-05-16 RX ADMIN — Medication 5 MG/HR: at 17:53

## 2022-05-16 RX ADMIN — ENOXAPARIN SODIUM 70 MG: 100 INJECTION SUBCUTANEOUS at 23:11

## 2022-05-16 RX ADMIN — DILTIAZEM HYDROCHLORIDE 17 MG: 5 INJECTION INTRAVENOUS at 17:16

## 2022-05-16 RX ADMIN — DEXTROSE MONOHYDRATE 150 MG: 50 INJECTION, SOLUTION INTRAVENOUS at 20:10

## 2022-05-16 RX ADMIN — NIACIN 500 MG: 500 TABLET, EXTENDED RELEASE ORAL at 23:10

## 2022-05-16 RX ADMIN — AMIODARONE HYDROCHLORIDE 1 MG/MIN: 50 INJECTION, SOLUTION INTRAVENOUS at 20:30

## 2022-05-16 RX ADMIN — ALLOPURINOL 300 MG: 300 TABLET ORAL at 23:10

## 2022-05-16 RX ADMIN — SODIUM CHLORIDE 1000 ML: 9 INJECTION, SOLUTION INTRAVENOUS at 17:12

## 2022-05-16 RX ADMIN — SODIUM CHLORIDE: 9 INJECTION, SOLUTION INTRAVENOUS at 23:13

## 2022-05-16 ASSESSMENT — ENCOUNTER SYMPTOMS
SHORTNESS OF BREATH: 0
CONSTIPATION: 0
VOMITING: 0
CHEST TIGHTNESS: 0
WHEEZING: 0
COLOR CHANGE: 0
NAUSEA: 0
ABDOMINAL PAIN: 0
COUGH: 0
ABDOMINAL DISTENTION: 0

## 2022-05-16 ASSESSMENT — PAIN - FUNCTIONAL ASSESSMENT
PAIN_FUNCTIONAL_ASSESSMENT: NONE - DENIES PAIN
PAIN_FUNCTIONAL_ASSESSMENT: NONE - DENIES PAIN

## 2022-05-16 ASSESSMENT — LIFESTYLE VARIABLES
HOW OFTEN DO YOU HAVE A DRINK CONTAINING ALCOHOL: NEVER
HOW MANY STANDARD DRINKS CONTAINING ALCOHOL DO YOU HAVE ON A TYPICAL DAY: 1 OR 2

## 2022-05-16 NOTE — H&P
[facility] - History & Physical      PCP: MICHELA Elizabeth CNP    Date of Admission: 5/16/2022    Date of Service: 5/16/2022    Chief Complaint:  Tachycardia      History Of Present Illness: The patient is a 70 y.o. male who presented to [facility] ER with PMH B-cell lymphoma, HTN, complaining of tachycardia. Patient states that he has not been feeling well for the past few days. He states that he checked his blood pressure this morning and noted his heart rate was 160. He was concerned about this and came into [facility] ER for further evaluation. Upon arrival to ER was found to be in atrial flutter with rapid ventricular rate. He states that he felt dizzy and lightheaded upon position change this morning. Denies fever, chills, nausea, vomiting, shortness of breath, and chest pain. Currently patient is resting comfortably no acute distress. Work-up in ER CXR no acute cardiopulmonary process, WBC 13.8, CMP WNL, troponin negative, , and urinalysis pending. Received 1L NS bolus, diltiazem 17mg IV and initiated on drip while in ER. Diltiazem drip was maxed out at 15 mg/hr this patient heart rate sustained 170 along with feeling short of breath. Amiodarone drip initiated. Patient is to be admitted to the hospitalist service with consultation to cardiology due to atrial flutter with rapid ventricular response. Past Medical History:        Diagnosis Date    Cancer [facility])     lymphoma    Chronic neck pain     Herniated disc, cervical     HTN (hypertension)        Past Surgical History:        Procedure Laterality Date    LYMPH NODE BIOPSY      left side of neck    PORT SURGERY N/A 4/8/2022    SINGLE LUMEN PORT PLACEMENT WITH US & FLUORO performed by Colleen Ramirez MD at [facility] Medications:  Prior to Admission medications    Medication Sig Start Date End Date Taking?  Authorizing Provider   lidocaine-prilocaine (EMLA) 2.5-2.5 % cream Apply topically to port area and cover with plastic wrap one hour prior to treatment. 4/8/22   Salina Fontanez MD   ondansetron (ZOFRAN) 4 MG tablet Take 1 tablet by mouth every 8 hours as needed for Nausea or Vomiting 4/8/22   Salina Fontanez MD   allopurinol (ZYLOPRIM) 300 MG tablet Take 1 tablet by mouth daily 4/4/22   Salina Fontanez MD   promethazine-dextromethorphan (PROMETHAZINE-DM) 6.25-15 MG/5ML syrup 1-2 tsp every hs  prn cough 3/17/22   MICHELA Rich CNP   turmeric (QC TUMERIC COMPLEX) 500 MG CAPS Take by mouth daily    Historical Provider, MD   zinc gluconate 50 MG tablet Take 50 mg by mouth daily    Historical Provider, MD   ferrous sulfate (IRON 325) 325 (65 Fe) MG tablet Take 325 mg by mouth daily (with breakfast)    Historical Provider, MD   KRILL OIL PO Take by mouth    Historical Provider, MD   Red Yeast Rice Extract (RED YEAST RICE PO) Take by mouth    Historical Provider, MD   Multiple Vitamins-Minerals (PRESERVISION AREDS) TABS Take by mouth    Historical Provider, MD   Probiotic Product (PROBIOTIC-10 PO) Take by mouth    Historical Provider, MD   Zn-Pyg Afri-Nettle-Saw Palmet (SAW PALMETTO COMPLEX PO) Take by mouth    Historical Provider, MD   amLODIPine (NORVASC) 5 MG tablet TAKE 1 TABLET NIGHTLY 5/24/21   MICHELA Rich CNP   benazepril (LOTENSIN) 20 MG tablet TAKE 1 TABLET EVERY DAY 5/24/21   MICHELA Rich CNP   Coenzyme Q10 (CO Q 10 PO) Take by mouth    Historical Provider, MD   Multiple Vitamins-Minerals (THERAPEUTIC MULTIVITAMIN-MINERALS) tablet Take 1 tablet by mouth daily    Historical Provider, MD   Vitamin D (CHOLECALCIFEROL) 1000 UNITS CAPS capsule Take 1,000 Units by mouth daily     Historical Provider, MD   niacin 500 MG CR capsule Take 500 mg by mouth nightly     Historical Provider, MD   aspirin 81 MG tablet Take 81 mg by mouth daily    Historical Provider, MD       Allergies:    Patient has no known allergies.     Social History:    The patient currently lives home  Tobacco:   reports that he quit smoking about 39 years ago. His smoking use included cigarettes. He has a 30.00 pack-year smoking history. He has never used smokeless tobacco.  Alcohol:   reports current alcohol use. Illicit Drugs: denies    Family History:      Problem Relation Age of Onset    Cancer Mother         lymphoma    Heart Disease Father        Review of Systems:   Review of Systems   Constitutional: Negative for chills, diaphoresis, fatigue and fever. Respiratory: Negative for cough, chest tightness, shortness of breath and wheezing. Cardiovascular: Positive for palpitations. Negative for chest pain. Gastrointestinal: Negative for abdominal distention, abdominal pain, constipation, nausea and vomiting. Skin: Negative for color change, pallor and rash. Neurological: Negative for tremors, syncope, weakness, light-headedness, numbness and headaches. Psychiatric/Behavioral: Negative for agitation, behavioral problems and confusion. 14 point review of systems is negative except as specifically addressed above. Physical Examination:  /87   Pulse 172   Temp 97.8 °F (36.6 °C) (Oral)   Resp 18   Ht 5' 7\" (1.702 m)   Wt 150 lb (68 kg)   SpO2 95%   BMI 23.49 kg/m²   Physical Exam  Vitals and nursing note reviewed. Constitutional:       Appearance: Normal appearance. HENT:      Mouth/Throat:      Mouth: Mucous membranes are moist.      Pharynx: Oropharynx is clear. Eyes:      Extraocular Movements: Extraocular movements intact. Conjunctiva/sclera: Conjunctivae normal.      Pupils: Pupils are equal, round, and reactive to light. Cardiovascular:      Rate and Rhythm: Tachycardia present. Rhythm irregular. Pulses: Normal pulses. Heart sounds: Normal heart sounds. No murmur heard. Pulmonary:      Effort: Pulmonary effort is normal. No respiratory distress. Breath sounds: Normal breath sounds.    Abdominal:      General: Bowel sounds are normal. There is no distension. Palpations: Abdomen is soft. Tenderness: There is no abdominal tenderness. There is no guarding or rebound. Musculoskeletal:         General: No swelling. Normal range of motion. Cervical back: Normal range of motion and neck supple. No rigidity or tenderness. Right lower leg: No edema. Left lower leg: No edema. Skin:     General: Skin is warm and dry. Capillary Refill: Capillary refill takes less than 2 seconds. Neurological:      General: No focal deficit present. Mental Status: He is alert and oriented to person, place, and time. Cranial Nerves: No cranial nerve deficit. Motor: Weakness present. Psychiatric:         Mood and Affect: Mood normal.         Behavior: Behavior normal.          Diagnostic Data:  CBC:  Recent Labs     05/16/22  1705   WBC 13.8*   HGB 13.3*   HCT 41.7*        BMP:  Recent Labs     05/16/22  1705      K 4.1      CO2 23   BUN 21   CREATININE 0.6   CALCIUM 8.9     Recent Labs     05/16/22  1705   AST 17   ALT 11   BILITOT <0.2   ALKPHOS 100     Cardiac Enzymes:   Recent Labs     05/16/22  1705   TROPONINI <0.01     Urinalysis:  Lab Results   Component Value Date    BLOODU neg 02/24/2022    SPECGRAV 1,010 02/24/2022    GLUCOSEU neg 02/24/2022       XR CHEST PORTABLE    Result Date: 5/16/2022  EXAMINATION: XR CHEST PORTABLE 5/16/2022 6:30 PM HISTORY: XR CHEST PORTABLE 5/16/2022 5:24 PM HISTORY: Chest pain COMPARISON: CT scan March 15, 2022. FINDINGS: Chronic interstitial fibrotic changes noted in the periphery of the right and left lung. . Cardiac silhouettes mild moderately enlarged. Slight widening the mediastinum is noted. Most likely this associated with mediastinal adenopathy. . The osseous structures and surrounding soft tissues demonstrate no acute abnormality. 1. Chronic change in the pulmonary parenchyma with no acute cardiopulmonary process.  2. Widened mediastinum consistent with mediastinal adenopathy.  Signed by Dr Carmen Hawley    Assessment/Plan:    Atrial flutter with rapid ventricular response [facility])  - Cardiology consultation and recommendations appreciated  -Amiodarone bolus and drip  - ECHO 5/43/6976 LV systolic function EF 37%  -TSH   - Trend troponin   - Serial and PRN EKGs  - Monitor on telemetry   -NPO at midnight for DCCV in AM     Essential hypertension   -Currently hypotensive we will hold home medications    Large B-cell lymphoma      DVT prophylaxis Lovenox     Signed:  MICHELA Bailey - CNP, 5/16/2022 6:28 PM

## 2022-05-16 NOTE — CONSULTS
[facility] ([facility]) Cardiology   Consult Note       Requesting MD:  Sy Cheung MD   Admit Status:         Patient:    Cuate Elizabeth  [id number]  1950         Chief Complaint: Atrial flutter with fast ventricular rate   HPI: Patient is a 70 y.o. male 2 days prior to admission has not been feeling well with weakness. Today he check his blood pressure with his cuff and showed blood pressure 617 systolic and heart rate 560. He was seen in the emergency room and found to have atrial flutter and fast ventricular rate. He denies any chest pain. There has never been any history of CVA or myocardial infarct. Echocardiogram was performed on 12 May. It showed preserved left ventricular systolic wall motion. GLS was slightly decreased after chemotherapy. He just started his chemotherapy in April for B-cell lymphoma. Before April patient has been very healthy and active. He has been treated for hypertension in the past.  He quit smoking 30 years ago. He drinks occasionally.   He does not use recreational drug    Review of Systems:  HENNT: normal  PULMONARY:, Smoker and quit 30 years ago  CVS:see history of present illness  ABD: denies any abdominal pain  PERIPHERL: normal  MSK: no swelling of the lower extremities  CNS: Denies any dizziness, syncopal episode or history of stroke  Renal: Denies any history of dysuria or frequency    Cardiac Specific Data:  Specialty Problems        Cardiology Problems    Essential hypertension              Past Medical History:  Past Medical History:   Diagnosis Date    Cancer ([address]     lymphoma    Chronic neck pain     Herniated disc, cervical     HTN (hypertension)         Past Surgical History:  Past Surgical History:   Procedure Laterality Date    LYMPH NODE BIOPSY      left side of neck    PORT SURGERY N/A 4/8/2022    SINGLE LUMEN PORT PLACEMENT WITH US & FLUORO performed by Federico Kong MD at [facility] OR       Past Family History:  Family History   Problem Relation Age of Onset    Cancer Mother         lymphoma    Heart Disease Father        Past Social History:  Social History     Socioeconomic History    Marital status:      Spouse name: Not on file    Number of children: Not on file    Years of education: Not on file    Highest education level: Not on file   Occupational History    Not on file   Tobacco Use    Smoking status: Former Smoker     Packs/day: 2.00     Years: 15.00     Pack years: 30.00     Types: Cigarettes     Quit date: 26     Years since quittin.3    Smokeless tobacco: Never Used   Vaping Use    Vaping Use: Never used   Substance and Sexual Activity    Alcohol use: Yes     Comment: Occasional     Drug use: No    Sexual activity: Not on file   Other Topics Concern    Not on file   Social History Narrative    Not on file     Social Determinants of Health     Financial Resource Strain:     Difficulty of Paying Living Expenses: Not on file   Food Insecurity:     Worried About [address] in the Last Year: Not on file    [address] in the Last Year: Not on file   Transportation Needs:     Lack of Transportation (Medical): Not on file    Lack of Transportation (Non-Medical):  Not on file   Physical Activity:     Days of Exercise per Week: Not on file    Minutes of Exercise per Session: Not on file   Stress:     Feeling of Stress : Not on file   Social Connections:     Frequency of Communication with Friends and Family: Not on file    Frequency of Social Gatherings with Friends and Family: Not on file    Attends Roman [employer] Services: Not on file    Active Member of Clubs or Organizations: Not on file    Attends Club or Organization Meetings: Not on file    Marital Status: Not on file   Intimate Partner Violence:     Fear of Current or Ex-Partner: Not on file    Emotionally Abused: Not on file    Physically Abused: Not on file    Sexually Abused: Not on file   Housing Stability:     Unable to Pay for Housing in the Last Year: Not on file    Number of Places Lived in the Last Year: Not on file    Unstable Housing in the Last Year: Not on file       Allergies:  No Known Allergies    Prior to Admission medications    Medication Sig Start Date End Date Taking? Authorizing Provider   lidocaine-prilocaine (EMLA) 2.5-2.5 % cream Apply topically to port area and cover with plastic wrap one hour prior to treatment.  4/8/22   Brunilda Pascal MD   ondansetron (ZOFRAN) 4 MG tablet Take 1 tablet by mouth every 8 hours as needed for Nausea or Vomiting 4/8/22   Brunilda Pascal MD   allopurinol (ZYLOPRIM) 300 MG tablet Take 1 tablet by mouth daily 4/4/22   Brunilda Pascal MD   promethazine-dextromethorphan (PROMETHAZINE-DM) 6.25-15 MG/5ML syrup 1-2 tsp every hs  prn cough 3/17/22   Radha Binning APRN - CNP   turmeric (QC TUMERIC COMPLEX) 500 MG CAPS Take by mouth daily    Historical Provider, MD   zinc gluconate 50 MG tablet Take 50 mg by mouth daily    Historical Provider, MD   ferrous sulfate (IRON 325) 325 (65 Fe) MG tablet Take 325 mg by mouth daily (with breakfast)    Historical Provider, MD   KRILL OIL PO Take by mouth    Historical Provider, MD   Red Yeast Rice Extract (RED YEAST RICE PO) Take by mouth    Historical Provider, MD   Multiple Vitamins-Minerals (PRESERVISION AREDS) TABS Take by mouth    Historical Provider, MD   Probiotic Product (PROBIOTIC-10 PO) Take by mouth    Historical Provider, MD   Zn-Pyg Afri-Nettle-Saw Palmet (SAW PALMETTO COMPLEX PO) Take by mouth    Historical Provider, MD   amLODIPine (NORVASC) 5 MG tablet TAKE 1 TABLET NIGHTLY 5/24/21   Radha Binning APRN - CNP   benazepril (LOTENSIN) 20 MG tablet TAKE 1 TABLET EVERY DAY 5/24/21   Radha BinningMICHELA - CNP   Coenzyme Q10 (CO Q 10 PO) Take by mouth    Historical Provider, MD   Multiple Vitamins-Minerals (THERAPEUTIC MULTIVITAMIN-MINERALS) tablet Take 1 tablet by mouth daily    Historical Provider, MD   Vitamin D (CHOLECALCIFEROL) 1000 UNITS CAPS capsule Take 1,000 Units by mouth daily     Historical Provider, MD   niacin 500 MG CR capsule Take 500 mg by mouth nightly     Historical Provider, MD   aspirin 81 MG tablet Take 81 mg by mouth daily    Historical Provider, MD        Current Facility-Administered Medications   Medication Dose Route Frequency Provider Last Rate Last Admin    0.9 % sodium chloride infusion   IntraVENous Continuous Gissel Alonso MD        0.9 % sodium chloride bolus  1,000 mL IntraVENous Once Gissel Alonso MD 1,000 mL/hr at 05/16/22 1712 1,000 mL at 05/16/22 1712    dilTIAZem HCl in sodium chloride 125 mg / 125 mL infusion  2.5-15 mg/hr IntraVENous Continuous Gissel Alonso MD 5 mL/hr at 05/16/22 1753 5 mg/hr at 05/16/22 1753     Current Outpatient Medications   Medication Sig Dispense Refill    lidocaine-prilocaine (EMLA) 2.5-2.5 % cream Apply topically to port area and cover with plastic wrap one hour prior to treatment.  1 each 1    ondansetron (ZOFRAN) 4 MG tablet Take 1 tablet by mouth every 8 hours as needed for Nausea or Vomiting 30 tablet 1    allopurinol (ZYLOPRIM) 300 MG tablet Take 1 tablet by mouth daily 30 tablet 5    promethazine-dextromethorphan (PROMETHAZINE-DM) 6.25-15 MG/5ML syrup 1-2 tsp every hs  prn cough 120 mL 0    turmeric (QC TUMERIC COMPLEX) 500 MG CAPS Take by mouth daily      zinc gluconate 50 MG tablet Take 50 mg by mouth daily      ferrous sulfate (IRON 325) 325 (65 Fe) MG tablet Take 325 mg by mouth daily (with breakfast)      KRILL OIL PO Take by mouth      Red Yeast Rice Extract (RED YEAST RICE PO) Take by mouth      Multiple Vitamins-Minerals (PRESERVISION AREDS) TABS Take by mouth      Probiotic Product (PROBIOTIC-10 PO) Take by mouth      Zn-Pyg Afri-Nettle-Saw Palmet (SAW PALMETTO COMPLEX PO) Take by mouth      amLODIPine (NORVASC) 5 MG tablet TAKE 1 TABLET NIGHTLY 90 tablet 3    benazepril (LOTENSIN) 20 MG tablet TAKE 1 TABLET EVERY DAY 90 tablet 3    Coenzyme Q10 (CO Q 10 PO) Take by mouth      Multiple Vitamins-Minerals (THERAPEUTIC MULTIVITAMIN-MINERALS) tablet Take 1 tablet by mouth daily      Vitamin D (CHOLECALCIFEROL) 1000 UNITS CAPS capsule Take 1,000 Units by mouth daily       niacin 500 MG CR capsule Take 500 mg by mouth nightly       aspirin 81 MG tablet Take 81 mg by mouth daily          Current Infused Meds:   sodium chloride      dilTIAZem 5 mg/hr (05/16/22 1753)       Physical Exam:  Vitals:    05/16/22 1655   BP: 119/87   Pulse: 172   Resp: 18   Temp: 97.8 °F (36.6 °C)   SpO2: 95%     No intake or output data in the 24 hours ending 05/16/22 1804  Estimated body mass index is 23.49 kg/m² as calculated from the following:    Height as of this encounter: 5' 7\" (1.702 m). Weight as of this encounter: 150 lb (68 kg). PHYSICAL EXAM:  HENNT: normal  PULMONARY: normal to inspection, palpation, percussion and ascultation  CVS: Heart sounds distant and fast, no gross murmur, possibly dimas wave of the neck  ABD: liver and spleen not palpable, nontender, bowel sound normal  PERIPHERL: all pulses are normal with no bruit  MSK: no swelling of the lower extremities  CNS: Normal CN 2,3,4,6,5,7,9,10,11,and 12. Oriented to time, place and person    Labs:  Recent Labs     05/16/22  1705   WBC 13.8*   HGB 13.3*          Recent Labs     05/16/22  1705      K 4.1      CO2 23   BUN 21   CREATININE 0.6   LABGLOM >60   CALCIUM 8.9       CK, CKMB, Troponin:   Recent Labs     05/16/22  1705   TROPONINI <0.01       Last 3 BNP:  Recent Labs     05/16/22  1705   PROBNP 598             XR CHEST PORTABLE    Result Date: 5/16/2022  1. Chronic change in the pulmonary parenchyma with no acute cardiopulmonary process. 2. Widened mediastinum consistent with mediastinal adenopathy. Signed by Dr Rangel Neighbours and Plan:  1. New onset atrial flutter with 2-1, ventricular rate 165/min tolerating it with the exceptions of weakness  2.  B-cell lymphoma on chemotherapy  3. Bicuspid aortic valve with 10 mm gradient only. Echocardiogram also showed preserved systolic ejection fraction. Left atrial size was normal.  There was no evidence of intracardiac thrombi  4. History of hypertension  5. Former smoker and quit 30 years ago    Plan: We will try to slow him down with IV Cardizem. TSH will be ordered. N.p.o. after midnight. Dr. Selena Armenta will do ARIADNA and cardioversion tomorrow    I have spent 60 minutes reviewing the chart, taking history, examining the patient, discussion with the patient and the family concerning the finding, diagnoses and treatment plan. This dictation was performed using [address]. Mistake and misspelling may have been created without  realizing them. Please notify me for clarification.   Thank you    Polina Gutierrez MD   5/16/2022, 6:04 PM

## 2022-05-17 ENCOUNTER — APPOINTMENT (OUTPATIENT)
Dept: CARDIAC CATH/INVASIVE PROCEDURES | Age: 72
DRG: 309 | End: 2022-05-17
Payer: MEDICARE

## 2022-05-17 LAB
ANION GAP SERPL CALCULATED.3IONS-SCNC: 9 MMOL/L (ref 7–19)
BUN BLDV-MCNC: 15 MG/DL (ref 8–23)
CALCIUM SERPL-MCNC: 8 MG/DL (ref 8.8–10.2)
CHLORIDE BLD-SCNC: 108 MMOL/L (ref 98–111)
CO2: 20 MMOL/L (ref 22–29)
CREAT SERPL-MCNC: 0.5 MG/DL (ref 0.5–1.2)
EKG P AXIS: NORMAL DEGREES
EKG P AXIS: NORMAL DEGREES
EKG P-R INTERVAL: NORMAL MS
EKG P-R INTERVAL: NORMAL MS
EKG Q-T INTERVAL: 254 MS
EKG Q-T INTERVAL: 278 MS
EKG QRS DURATION: 86 MS
EKG QRS DURATION: 86 MS
EKG QTC CALCULATION (BAZETT): 430 MS
EKG QTC CALCULATION (BAZETT): 432 MS
EKG T AXIS: 44 DEGREES
EKG T AXIS: 7 DEGREES
GFR AFRICAN AMERICAN: >59
GFR NON-AFRICAN AMERICAN: >60
GLUCOSE BLD-MCNC: 112 MG/DL (ref 74–109)
HCT VFR BLD CALC: 35.9 % (ref 42–52)
HEMOGLOBIN: 11.4 G/DL (ref 14–18)
LV EF: 55 %
LVEF MODALITY: NORMAL
MCH RBC QN AUTO: 30.2 PG (ref 27–31)
MCHC RBC AUTO-ENTMCNC: 31.8 G/DL (ref 33–37)
MCV RBC AUTO: 95 FL (ref 80–94)
PDW BLD-RTO: 20.4 % (ref 11.5–14.5)
PLATELET # BLD: 237 K/UL (ref 130–400)
PMV BLD AUTO: 10.2 FL (ref 9.4–12.4)
POTASSIUM REFLEX MAGNESIUM: 4 MMOL/L (ref 3.5–5)
RBC # BLD: 3.78 M/UL (ref 4.7–6.1)
SODIUM BLD-SCNC: 137 MMOL/L (ref 136–145)
TROPONIN: <0.01 NG/ML (ref 0–0.03)
WBC # BLD: 11.6 K/UL (ref 4.8–10.8)

## 2022-05-17 PROCEDURE — 2580000003 HC RX 258

## 2022-05-17 PROCEDURE — 92960 CARDIOVERSION ELECTRIC EXT: CPT | Performed by: INTERNAL MEDICINE

## 2022-05-17 PROCEDURE — 2500000003 HC RX 250 WO HCPCS

## 2022-05-17 PROCEDURE — 6370000000 HC RX 637 (ALT 250 FOR IP)

## 2022-05-17 PROCEDURE — 2500000003 HC RX 250 WO HCPCS: Performed by: STUDENT IN AN ORGANIZED HEALTH CARE EDUCATION/TRAINING PROGRAM

## 2022-05-17 PROCEDURE — 93005 ELECTROCARDIOGRAM TRACING: CPT | Performed by: INTERNAL MEDICINE

## 2022-05-17 PROCEDURE — 2140000000 HC CCU INTERMEDIATE R&B

## 2022-05-17 PROCEDURE — 93010 ELECTROCARDIOGRAM REPORT: CPT | Performed by: INTERNAL MEDICINE

## 2022-05-17 PROCEDURE — 85027 COMPLETE CBC AUTOMATED: CPT

## 2022-05-17 PROCEDURE — B24BZZ4 ULTRASONOGRAPHY OF HEART WITH AORTA, TRANSESOPHAGEAL: ICD-10-PCS | Performed by: INTERNAL MEDICINE

## 2022-05-17 PROCEDURE — 6360000002 HC RX W HCPCS: Performed by: STUDENT IN AN ORGANIZED HEALTH CARE EDUCATION/TRAINING PROGRAM

## 2022-05-17 PROCEDURE — 93312 ECHO TRANSESOPHAGEAL: CPT

## 2022-05-17 PROCEDURE — 36415 COLL VENOUS BLD VENIPUNCTURE: CPT

## 2022-05-17 PROCEDURE — 92960 CARDIOVERSION ELECTRIC EXT: CPT

## 2022-05-17 PROCEDURE — 99152 MOD SED SAME PHYS/QHP 5/>YRS: CPT

## 2022-05-17 PROCEDURE — 99222 1ST HOSP IP/OBS MODERATE 55: CPT | Performed by: INTERNAL MEDICINE

## 2022-05-17 PROCEDURE — 2580000003 HC RX 258: Performed by: STUDENT IN AN ORGANIZED HEALTH CARE EDUCATION/TRAINING PROGRAM

## 2022-05-17 PROCEDURE — 2500000003 HC RX 250 WO HCPCS: Performed by: EMERGENCY MEDICINE

## 2022-05-17 PROCEDURE — 93321 DOPPLER ECHO F-UP/LMTD STD: CPT

## 2022-05-17 PROCEDURE — 6360000002 HC RX W HCPCS

## 2022-05-17 PROCEDURE — 84484 ASSAY OF TROPONIN QUANT: CPT

## 2022-05-17 PROCEDURE — 80048 BASIC METABOLIC PNL TOTAL CA: CPT

## 2022-05-17 PROCEDURE — 5A2204Z RESTORATION OF CARDIAC RHYTHM, SINGLE: ICD-10-PCS | Performed by: INTERNAL MEDICINE

## 2022-05-17 PROCEDURE — 6370000000 HC RX 637 (ALT 250 FOR IP): Performed by: INTERNAL MEDICINE

## 2022-05-17 PROCEDURE — 6360000002 HC RX W HCPCS: Performed by: INTERNAL MEDICINE

## 2022-05-17 RX ORDER — DILTIAZEM HYDROCHLORIDE 5 MG/ML
10 INJECTION INTRAVENOUS ONCE
Status: COMPLETED | OUTPATIENT
Start: 2022-05-17 | End: 2022-05-17

## 2022-05-17 RX ORDER — AMIODARONE HYDROCHLORIDE 200 MG/1
200 TABLET ORAL 2 TIMES DAILY
Status: DISCONTINUED | OUTPATIENT
Start: 2022-05-17 | End: 2022-05-18 | Stop reason: HOSPADM

## 2022-05-17 RX ORDER — DILTIAZEM HYDROCHLORIDE 120 MG/1
120 CAPSULE, COATED, EXTENDED RELEASE ORAL DAILY
Status: DISCONTINUED | OUTPATIENT
Start: 2022-05-17 | End: 2022-05-18 | Stop reason: HOSPADM

## 2022-05-17 RX ADMIN — NIACIN 500 MG: 500 TABLET, EXTENDED RELEASE ORAL at 19:58

## 2022-05-17 RX ADMIN — SODIUM CHLORIDE, PRESERVATIVE FREE 10 ML: 5 INJECTION INTRAVENOUS at 19:59

## 2022-05-17 RX ADMIN — MULTIPLE VITAMINS W/ MINERALS TAB 1 TABLET: TAB at 09:59

## 2022-05-17 RX ADMIN — ALLOPURINOL 300 MG: 300 TABLET ORAL at 10:00

## 2022-05-17 RX ADMIN — AMIODARONE HYDROCHLORIDE 200 MG: 200 TABLET ORAL at 15:27

## 2022-05-17 RX ADMIN — DILTIAZEM HYDROCHLORIDE 10 MG: 5 INJECTION INTRAVENOUS at 05:41

## 2022-05-17 RX ADMIN — AMIODARONE HYDROCHLORIDE 0.5 MG/MIN: 50 INJECTION, SOLUTION INTRAVENOUS at 20:36

## 2022-05-17 RX ADMIN — ZINC SULFATE 220 MG (50 MG) CAPSULE 50 MG: CAPSULE at 09:53

## 2022-05-17 RX ADMIN — Medication 1000 UNITS: at 09:53

## 2022-05-17 RX ADMIN — ACETAMINOPHEN 650 MG: 325 TABLET ORAL at 19:58

## 2022-05-17 RX ADMIN — Medication 15 MG/HR: at 20:02

## 2022-05-17 RX ADMIN — AMIODARONE HYDROCHLORIDE 0.5 MG/MIN: 50 INJECTION, SOLUTION INTRAVENOUS at 04:07

## 2022-05-17 RX ADMIN — ENOXAPARIN SODIUM 70 MG: 100 INJECTION SUBCUTANEOUS at 19:59

## 2022-05-17 RX ADMIN — FERROUS SULFATE TAB 325 MG (65 MG ELEMENTAL FE) 325 MG: 325 (65 FE) TAB at 09:53

## 2022-05-17 RX ADMIN — DILTIAZEM HYDROCHLORIDE 120 MG: 120 CAPSULE, COATED, EXTENDED RELEASE ORAL at 15:27

## 2022-05-17 RX ADMIN — Medication 5 MG/HR: at 13:56

## 2022-05-17 RX ADMIN — ASPIRIN 81 MG 81 MG: 81 TABLET ORAL at 09:53

## 2022-05-17 ASSESSMENT — ENCOUNTER SYMPTOMS
CONSTIPATION: 0
BLOOD IN STOOL: 0
SHORTNESS OF BREATH: 0
WHEEZING: 0
VOMITING: 0
DIARRHEA: 0
ABDOMINAL DISTENTION: 0
COUGH: 0
NAUSEA: 1
COLOR CHANGE: 0
ABDOMINAL PAIN: 0

## 2022-05-17 ASSESSMENT — PAIN DESCRIPTION - DESCRIPTORS: DESCRIPTORS: ACHING

## 2022-05-17 ASSESSMENT — PAIN SCALES - GENERAL: PAINLEVEL_OUTOF10: 3

## 2022-05-17 ASSESSMENT — PAIN DESCRIPTION - LOCATION: LOCATION: ARM

## 2022-05-17 ASSESSMENT — PAIN DESCRIPTION - ORIENTATION: ORIENTATION: RIGHT

## 2022-05-17 ASSESSMENT — PAIN - FUNCTIONAL ASSESSMENT: PAIN_FUNCTIONAL_ASSESSMENT: PREVENTS OR INTERFERES SOME ACTIVE ACTIVITIES AND ADLS

## 2022-05-17 NOTE — PROGRESS NOTES
[facility]      Patient:  Jagruti Cifuentes  YOB: 1950  Date of Service: 5/17/2022  MRN: [id number]   Acct: [de-identified]   Primary Care Physician: MICHELA Ordonez CNP  Advance Directive: Full Code  Admit Date: 5/16/2022       Hospital Day: 1  Portions of this note have been copied forward, however, changed to reflect the most current clinical status of this patient. CHIEF COMPLAINT Tachycardia    SUBJECTIVE:  Continues to have weakness and states that he doesn't really feel any different than when was admitted. Had some mild nausea this AM after taking po meds while NPO. No other overnight events or issues    [facility]  The patient is a 70 y.o. male who presented to [facility] ER on 5/16/2022 with PMH B-cell lymphoma, HTN, complaining of tachycardia. He denies a previous history of irregular HR. He states that he has not felt well over the last few days. He took his BP on his home monitor the am of his admit and the monitor told him that his HR was 160. Denied any recent illnesses, chest pain, palpitations, fevers or chills. He became concerned and came to the ED for further evaluation. He did C/O dizziness with position change before coming to the ED. Further ED work-up revealedWBC 13.8, CMP WNL, troponin negative,  and UA was negative. EKG showed a-flutter 170's with RVR. Patient was started on a cardizem gtt and was maxed out at 15mg/hr. Cardiology was consulted and Amiodarone was initiated. For ARIADNA and cardioversion on 5/17/2022, which was unsuccessful. EP was consulted with the plan to control rate with oral rate control meds and anticoagulants. HR remains 150-160's. Leukocytosis improved 11.6. Remains afebrile. Review of Systems:   Review of Systems   Constitutional: Positive for fatigue. Negative for chills, diaphoresis and fever. HENT: Negative for congestion and ear pain. Eyes: Negative for visual disturbance.    Respiratory: Negative for cough, normal. No respiratory distress. Breath sounds: Normal breath sounds. No wheezing, rhonchi or rales. Abdominal:      General: Bowel sounds are normal. There is no distension. Palpations: Abdomen is soft. Tenderness: There is no abdominal tenderness. Musculoskeletal:         General: No swelling, tenderness or deformity. Normal range of motion. Cervical back: Normal range of motion and neck supple. No muscular tenderness. Right lower leg: No edema. Left lower leg: No edema. Skin:     General: Skin is warm and dry. Findings: No bruising or lesion. Neurological:      Mental Status: He is alert and oriented to person, place, and time. Motor: Weakness (generalized) present. Psychiatric:         Mood and Affect: Mood normal.         Behavior: Behavior normal.         Thought Content: Thought content normal.           Medications:      sodium chloride 1,000 mL/hr at 05/16/22 2313    dilTIAZem 15 mg/hr (05/17/22 1505)    sodium chloride      amiodarone 0.5 mg/min (05/17/22 0407)      amiodarone  200 mg Oral BID    dilTIAZem  120 mg Oral Daily    enoxaparin  1 mg/kg SubCUTAneous BID    sodium chloride flush  5-40 mL IntraVENous 2 times per day    allopurinol  300 mg Oral Daily    aspirin  81 mg Oral Daily    [Held by provider] lisinopril  20 mg Oral Daily    ferrous sulfate  325 mg Oral Daily with breakfast    therapeutic multivitamin-minerals  1 tablet Oral Daily    niacin  500 mg Oral Nightly    Vitamin D  1,000 Units Oral Daily    zinc sulfate  50 mg Oral Daily     sodium chloride flush, sodium chloride, ondansetron **OR** ondansetron, polyethylene glycol, acetaminophen **OR** acetaminophen  ADULT DIET;  Regular; Low Fat/Low Chol/High Fiber/AMILCAR     Lab and other Data:     Recent Labs     05/16/22  1705 05/17/22  0215   WBC 13.8* 11.6*   HGB 13.3* 11.4*    237     Recent Labs     05/16/22  1705 05/17/22  0215    137   K 4.1 4.0    108   CO2 23 20*   BUN 21 15   CREATININE 0.6 0.5   GLUCOSE 116* 112*     Recent Labs     05/16/22  1705   AST 17   ALT 11   BILITOT <0.2   ALKPHOS 100     Troponin T:   Recent Labs     05/16/22  1705 05/16/22  2239 05/17/22  0215   TROPONINI <0.01 0.01 <0.01     Pro-BNP: No results for input(s): BNP in the last 72 hours. INR: No results for input(s): INR in the last 72 hours. UA:  Recent Labs     05/16/22  1950   COLORU YELLOW   PHUR 6.0   CLARITYU Clear   SPECGRAV 1.015   LEUKOCYTESUR Negative   UROBILINOGEN 1.0   BILIRUBINUR Negative   BLOODU Negative   GLUCOSEU Negative     A1C: No results for input(s): LABA1C in the last 72 hours. ABG:No results for input(s): PHART, CGP7ASI, PO2ART, VTF7WRL, BEART, HGBAE, W5XPXJON, CARBOXHGBART in the last 72 hours. RAD:   XR CHEST PORTABLE    Result Date: 5/16/2022  1. Chronic change in the pulmonary parenchyma with no acute cardiopulmonary process. 2. Widened mediastinum consistent with mediastinal adenopathy. Signed by Dr Dasha Wallace       Assessment/Plan   Principal Problem:    Atrial flutter with rapid ventricular response ([address]   -For cardioversion today per cardiology-unsuccessful   -EP following-plan to change to oral therapy for rate control   -Continue Amiodarone gtt   -Will plan to transition to oral anticoagulation   -Tele   -AM labs     Active Problems:    Essential hypertension   -Monitor-currently on rate control gtt       Large B-cell lymphoma ([facility] [address]   -Noted   -Daily labs    Resolved Problems:    * No resolved hospital problems.  *      DVT Prophylaxis: Lovenox    Disposition: LETY Viveros, MICHELA, 5/17/2022 5:20 PM

## 2022-05-17 NOTE — CONSULTS
MEDICAL ONCOLOGY CONSULTATION    Pt Name: Gigi Garrison  MRN: [id number]  YOB: 1950  Date of evaluation: 5/17/2022    REASON FOR CONSULTATION: Atrial fibrillation, history of lymphoma, continued of care  REQUESTING PHYSICIAN: Hospitalist    History Obtained From:    patient, electronic medical record    HISTORY OF PRESENT ILLNESS:    Diagnosed this admission  · -A. fib with RVR      Patient is a very pleasant 70years old male who has been diagnosed with diffuse large B-cell lymphoma stage IIIb. He was started on chemotherapy with R-CHOP. Patient has several comorbidities to include a history of hypertension. Presents to the hospital with complaints of palpitation. Was noted to have heart rate on the 160s. He was in atrial flutter with rapid ventricular response. Patient had complained of dizziness and lightheadedness. Denied any febrile episode. Denies any chest pain. BNP was 598, troponin negative. Cardiology was consulted. Patient started amiodarone drip. He was anticoagulated. He underwent transesophageal echocardiogram that showed normal EF. No evidence of intra-atrial thrombus. Patient was shocked 3 times but unsuccessful. Prior oncological history:  The patient was seen by ENT and underwent excisional biopsy of a neck lymph node. Results of pathology are not available yet. I called and discussed with pathologist, Dr. Durga Campuzano. She is concerned that these represent a high-grade lymphoma. Further studies are ongoing at [facility] oncology. Final results likely expected for the end of the week. Preliminary diagnosis is lymphoma. Discussed the patient results of prior blood work. The patient continued to have symptoms of night sweats, fatigue. He received R-CHOP about 3 weeks ago. He tolerated treatment complains of fatigue.   He has been eating well.     Diagnosis  · Diffuse large B-cell lymphoma, March 2022  · c-Myc, BCL6 and BCL2 rearrangement pending  · Stage IIIB     Treatment summary  · 4/8/2022- Initiated R-CHOP + GCSF every 3 weeks x 6 cycles     Hematology history  Alonzo Hernandez was first seen by me on 3/24/2022. The patient was referred by his primary care provider for findings of generalized lymphadenopathy. The patient has had symptoms of weight loss, night sweats and low-grade fever. This seems has been going on for many months now. Patient had Covid 19 infection last year. He had CT scans that showed generalized adenopathy. · 3/22/2022-CT soft tissue neck showed findings of cervical adenopathy identified, including a right level 3 node measuring 1.6 cm in greatest axial diameter (series 4-image 95). There is a left level 5 lymph node measuring 1.7 cm on axial image 102. Multiple enlarged left level 4 nodes, including a 2.3 cm node on axial image 114. Adenopathy extends down into the left supraclavicular fossa and there is bulky adenopathy also appreciated in the upper mediastinum. · 3/22/2022-CT chest with contrast showed findings of mediastinal adenopathy. Subcarinal lymph nodes are present. Right hilar lymph nodes are present. Left para-aortic adenopathy is present in the abdomen. Concern for lymphoma. · 3/22/2002-CT abdomen/pelvis showed spleen is enlarged measuring 14 cm craniocaudal dimension. Bilateral renal cysts including dominant 5.4 cm RIGHT upper pole exophytic renal cyst. 3 mm nonobstructing LEFT lower pole renal calculus. Prostate is mildly enlarged measuring 4.8 cm transverse dimension. Bulky retroperitoneal lymphadenopathy is noted. Noted conglomerate in the LEFT periaortic region on image 34 series 4 measures 6.0 x 3.8 cm. Enlarged gastrohepatic ligament lymph nodes with reference node on image 23 measuring 14 mm short axis dimension. No enlarged pelvic or inguinal lymph nodes identified. Bilateral chronic L5 pars defects with grade 1 anterolisthesis of L5 on S1. No acute osseous finding.   · 3/24/2022- (H), hemoglobin 10.1/MCV 93, platelets 839,164, WBC 8.7  · 3/24/2022-she was first seen by me. Recommended excisional biopsy left supraclavicular lymph node. · 3/24/2022  B2M 2.8, Uric Acid 3.6,   · 3/28/2022 Left Cervical Lymphadenopathy (BHP): The dominant nodes are seen in the left level 4  and left level 5 neck as well as in the left supraclavicular fossa and upper mediastinum. Degenerative spinal stenosis. Atheromatous calcification in the carotid bulbs. · 3/28/2022-excisional anibal biopsy. Final pathology pending. Preliminary results suggestive of high-grade lymphoma. Recommend allopurinol 300 mg p.o. daily. Recommend a PET scan and bone marrow biopsy. · 4/4/2022-IHC studies from excisional node biopsy consistent with diffuse large B-cell lymphoma, ABC phenotype. BCL2, BCL6 and c-Myc rearrangement in process. Recommended R-CHOP x6 cycles. Started on allopurinol. · 4/6/22 Bone marrow ([employer]): BONE MARROW: Normocellular (30-35% cellular) marrow with multilineage hematopoiesis. Negative for lymphoid infiltrates. Negative for dysplasia, no increase in blasts. Storage iron present, no ring sideroblasts. FLOW CYTOMETRY: No B-cell monoclonality and no T-cell aberrant antigenic expression. The blasts are not increased. CYTOGENETICS: Normal male karyotype. · 4/7/22 PET CT SKULL BASE TO MID THIGH  Extensive lymphadenopathy consistent with lymphoma. Many of these nodes are extremely metabolically active including SUV measurements of greater than 40-50 in some of the nodes. There is extensive adenopathy in the lower cervical chains including level 3, 4 and 5 nodes. Supraclavicular lymphadenopathy is present with extension into the superior mediastinum.  Both anterior and middle mediastinal adenopathy as well as right hilar adenopathy is noted within the chest. A right retrocrural node, gastrohepatic ligament nodes and bulky left para-aortic nodes are also present all demonstrating intense abnormal metabolic APRN - CNP        Or    ondansetron (ZOFRAN) injection 4 mg  4 mg IntraVENous Q6H PRN Dann Greet, APRN - CNP        polyethylene glycol (GLYCOLAX) packet 17 g  17 g Oral Daily PRN Dann Greet, APRN - CNP        acetaminophen (TYLENOL) tablet 650 mg  650 mg Oral Q6H PRN Dann Greet, APRN - CNP        Or    acetaminophen (TYLENOL) suppository 650 mg  650 mg Rectal Q6H PRN Dann Greet, APRN - CNP        allopurinol (ZYLOPRIM) tablet 300 mg  300 mg Oral Daily Dann Greet, APRN - CNP   300 mg at 05/17/22 1000    aspirin chewable tablet 81 mg  81 mg Oral Daily Dann Greet, APRN - CNP   81 mg at 05/17/22 0953    [Held by provider] lisinopril (PRINIVIL;ZESTRIL) tablet 20 mg  20 mg Oral Daily Dann Greet, APRN - CNP        ferrous sulfate (IRON 325) tablet 325 mg  325 mg Oral Daily with breakfast Dann Ashleyet, APRN - CNP   325 mg at 05/17/22 5608    therapeutic multivitamin-minerals 1 tablet  1 tablet Oral Daily Dann Greet, APRN - CNP   1 tablet at 05/17/22 0959    niacin (NIASPAN) extended release tablet 500 mg  500 mg Oral Nightly Dann Greet, APRN - CNP   500 mg at 05/16/22 2310    Vitamin D (CHOLECALCIFEROL) tablet 1,000 Units  1,000 Units Oral Daily Dann Greet, APRN - CNP   1,000 Units at 05/17/22 7639    zinc sulfate (ZINCATE) capsule 50 mg  50 mg Oral Daily Dann Greet, APRN - CNP   50 mg at 05/17/22 2133    amiodarone (CORDARONE) 450 mg in dextrose 5 % 250 mL infusion  0.5 mg/min IntraVENous Continuous Ha Isaacs MD 16.7 mL/hr at 05/17/22 0407 0.5 mg/min at 05/17/22 0407       Allergies: No Known Allergies      Subjective   REVIEW OF SYSTEMS:   CONSTITUTIONAL: no fever, no night sweats,  fatigue;  HEENT: no blurring of vision, no double vision, no hearing difficulty, no tinnitus, no ulceration, no epistaxis;  LUNGS: no cough, no hemoptysis, no wheeze,  no shortness of breath;  CARDIOVASCULAR: palpitation, no chest pain, no shortness of breath;  GI: no abdominal pain, no nausea, no vomiting, no diarrhea, no constipation;  KARINA: no dysuria, no hematuria, no frequency or urgency, no nephrolithiasis;  MUSCULOSKELETAL: no joint pain, no swelling, no stiffness;  ENDOCRINE: no polyuria, no polydipsia, no cold or heat intolerance;  HEMATOLOGY: no easy bruising or bleeding, no history of clotting disorder;  DERMATOLOGY: no skin rash, no eczema, no pruritus;  PSYCHIATRY: no depression, no anxiety  NEUROLOGY: no syncope, no seizures, no numbness or tingling of hands, no numbness or tingling of feet, no paresis;    Objective   /72   Pulse 153   Temp 97.1 °F (36.2 °C)   Resp 20   Ht 5' 7\" (1.702 m)   Wt 159 lb 6.4 oz (72.3 kg)   SpO2 98%   BMI 24.97 kg/m²     PHYSICAL EXAM:  CONSTITUTIONAL: Alert, appropriate, no acute distress  EYES: Non icteric, EOM intact, pupils equal round   ENT: Mucus membranes moist,external inspection of ears and nose are normal  NECK: Supple, no masses. No palpable thyroid mass  CHEST/LUNGS: CTA bilaterally, normal respiratory effort   CARDIOVASCULAR: Irregular heart rhythm,, no murmurs. No lower extremity edema  ABDOMEN: soft non-tender, active bowel sounds, no HSM. No palpable masses  EXTREMITIES: warm, full ROM in all 4 extremities, no focal weakness. SKIN: warm, dry with no rashes or lesions  LYMPH: No cervical, clavicular, axillary, or inguinal lymphadenopathy  NEUROLOGIC: follows commands, non focal   PSYCH: mood and affect appropriate.  Alert and oriented to time, place, person        LABORATORY RESULTS REVIEWED/ANALYZED BY ME:  Recent Labs     05/17/22  0215 05/16/22  1705 04/29/22  0939   WBC 11.6* 13.8* 9.94*   HGB 11.4* 13.3* 11.3*   HCT 35.9* 41.7* 35.7*   MCV 95.0* 95.4* 93.5*    275 316       Lab Results   Component Value Date     05/17/2022    K 4.0 05/17/2022     05/17/2022    CO2 20 (L) 05/17/2022    BUN 15 05/17/2022    CREATININE 0.5 05/17/2022    GLUCOSE 112 (H) 05/17/2022    CALCIUM 8.0 (L) 05/17/2022 PROT 6.7 05/16/2022    LABALBU 3.8 05/16/2022    BILITOT <0.2 05/16/2022    ALKPHOS 100 05/16/2022    AST 17 05/16/2022    ALT 11 05/16/2022    LABGLOM >60 05/17/2022    GFRAA >59 05/17/2022    AGRATIO 1.5 09/19/2016    GLOB 3.7 04/29/2022       No results found for: INR, PROTIME    RADIOLOGY STUDIES REPORT/REVIEWED AND INTERPRETED BY ME:  ECHO Transesophageal    Result Date: 5/17/2022  Transesophageal Echocardiography Report (ARIADNA)   Demographics   Patient Name  Nisa Sutton Date of Study         05/17/2022   MRN           [id number]           Gender                Male   Date of Birth 1950       Room Number           Carina Parisi   Age           70 year(s)   Height:       67 inches        Referring Physician   Summer Montenegro MD   Weight:       159 pounds       Sonographer           Lela Celis RDCS   BSA:          1.83 m^2         Interpreting          Summer Montenegro MD                                 Physician   BMI:          24.9 kg/m^2  Procedure Type of Study   ARIADNA procedure:ECHOCARDIOGRAM TRANSESOPHAGEAL. Study Location: Cath Lab Technical Quality: Adequate visualization Patient Status: Inpatient Rhythm: Atrial fibrillation Indications:Atrial fibrillation. Conclusions   Summary  Anesthesia start time: 6005  Anesthesia stop time: 4911  Mitral valve leaflets are mildly thickened with preserved leaflet  mobility. Mild mitral regurgitation is present. Mildly thickened aortic valve leaflets with preserved leaflet mobility. Moderate TR  No evidence of thrombus or spontaneous contrast noted within the JAZMINE. Normal intact intra-atrial septum was noted with no evidence of  significant intra-atrial communications by color flow Doppler or by  agitated saline study. Normal left ventricular size with preserved LV function and an estimated  ejection fraction of approximately 55%. No evidence of left ventricular mass or thrombus noted. Evidence of mild atherosclerosis noted involving the the descending aorta. Negative bubble study   Signature   ----------------------------------------------------------------  Electronically signed by Luoise Lagunas MD(Interpreting  physician) on 05/17/2022 02:03 PM  ----------------------------------------------------------------   Findings   Mitral Valve  Mitral valve leaflets are mildly thickened with preserved leaflet  mobility. Mild mitral regurgitation is present. Aortic Valve  Mildly thickened aortic valve leaflets with preserved leaflet mobility. Tricuspid Valve  Moderate TR   Pulmonic Valve  The pulmonic valve was not well visualized . Left Atrium  No evidence of thrombus or spontaneous contrast noted within the JAZMINE. Normal intact intra-atrial septum was noted with no evidence of  significant intra-atrial communications by color flow Doppler or by  agitated saline study. Left Ventricle  Normal left ventricular size with preserved LV function and an estimated  ejection fraction of approximately 55%. No evidence of left ventricular mass or thrombus noted. Right Atrium  Normal right atrial dimension with no evidence of thrombus or mass noted. Right Ventricle  Normal right ventricular size with preserved RV function. Pericardial Effusion  No evidence of significant pericardial effusion is noted. Pleural Effusion  No evidence of pleural effusion. Miscellaneous  Evidence of mild atherosclerosis noted involving the the descending aorta.   Negative bubble study  ARIADNA Performed By: the attending and the sonographer 2D Measurements and Calculations(cm)   % Ejection Fraction: 55 %  Doppler Measurements and Calculations                                           MV Mean velocity:  TR Velocity:228 cm/s  TR Gradient:20.79 mmHg      ECHO Complete 2D W Doppler W Color    Result Date: 5/12/2022  Transthoracic Echocardiography Report (TTE)  Demographics   Patient Name  Aman Lopez Date of Study         05/12/2022   MRN           [id number]           Gender                Male Date of Birth 1950       Room Number   Age           70 year(s)   Height:       67 inches        Referring Physician   Francisco Munoz   Weight:       153 pounds       Sonographer           Regine Joyner, [facility]   BSA:          1.8 m^2          Interpreting          Saniya Wynn MD                                 Physician   BMI:          23.96 kg/m^2  Procedure Type of Study   TTE procedure:ECHO NO CONTRAST WITH DOP/COLR. Study Location: Echo Lab Technical Quality: Adequate visualization Patient Status: Outpatient HR: 82 bpm BP: 152/76 mmHg Indications:Chemotherapy. Conclusions   Summary  Left ventricle normal chamber size and thickness  Preserved systolic function with estimated ejection fraction of 54%  However, GLS is slightly decreased especially the apex. 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 Judging from stroke-volume  index which is 27.7, which is reduced. This may represent low gradient,  normal ejection fraction, low stroke-volume significant aortic stenosis. If patient is symptomatic, please refer to cardiology for ARIADNA   Signature   ----------------------------------------------------------------  Electronically signed by Saniya Wynn MD(Interpreting physician)  on 05/12/2022 07:49 PM  ----------------------------------------------------------------   Findings   Mitral Valve  See conclusion   Aortic Valve  Bicuspid aortic valve with gradient of 10 mm. Judging from stroke-volume  index which is 27.7, which is reduced. This may represent low gradient,  normal ejection fraction, low stroke-volume significant aortic stenosis.   If patient is symptomatic, please refer to cardiology for ARIADNA   Tricuspid Valve  Tricuspid valve is structurally normal.   Pulmonic Valve  The pulmonic valve was not well visualized . Left Atrium  Normal size left atrium. Left Ventricle  See conclusion   Right Atrium  Normal right atrial dimension with no evidence of thrombus or mass noted. Right Ventricle  Normal right ventricular size with preserved RV function. Miscellaneous  Normal IVC  2D Measurements and Calculations(cm)   LVIDd: 4.71 cm                       LVIDs: 3.73 cm  IVSd: 1 cm  LVPWd: 0.9 cm                        AO Root Dimension: 1.4 cm  Rt. Vent. Dimension: 2.8 cm          LA Dimension: 3.6 cm  % Ejection Fraction: 54 %            LA Area: 13.3 cm^2  LA Volume: 36.9 ml                   LV Systolic dimension: 7.59WA  LA Volume Index: 20 ml/m^2           LV PW diastolic: 6.2HR  LV dimension: 4.71 cm                LVOT diameter: 1.9 cm                                       RV Diastolic dimension: 8.4JQ  LVEDV: 91 ml                         LVEDVI: 51 ml/m^2  LVESV:39.7 ml                        LVESVI: 22 ml/m^2  Cardic Output (CO): 4.11l/min  Ascending Aorta: 3.1 cm  Doppler Measurements and Calculations   AV Peak Velocity:235 cm/s               MV Peak E-Wave: 75.8 cm/s  AV Mean Velocity:162 cm/s               MV Peak A-Wave: 78.8 cm/s  AV Peak Gradient: 22.09 mmHg            MV E/A Ratio: 0.96 %  AV Mean Gradient: 12 mmHg               MV Mean velocity: NaNcm/s  AV Area (Continuity):1.16 cm^2          MV Peak Gradient: 2.3 mmHg  AV VTI:43.1 cm/s   MV E' septal velocity: 8.05cm/s  MV E' lateral velocity:11.4 cm/s      XR CHEST PORTABLE    Result Date: 5/16/2022  EXAMINATION: XR CHEST PORTABLE 5/16/2022 6:30 PM HISTORY: XR CHEST PORTABLE 5/16/2022 5:24 PM HISTORY: Chest pain COMPARISON: CT scan March 15, 2022. FINDINGS: Chronic interstitial fibrotic changes noted in the periphery of the right and left lung. . Cardiac silhouettes mild moderately enlarged. Slight widening the mediastinum is noted. Most likely this associated with mediastinal adenopathy.  . The osseous structures and surrounding soft tissues demonstrate no acute abnormality. 1. Chronic change in the pulmonary parenchyma with no acute cardiopulmonary process. 2. Widened mediastinum consistent with mediastinal adenopathy. Signed by Dr Kendrick Radford'    ASSESSMENT:  #A. fib with RVR  -5/17/2022-cardioversion performed 2 shocks with successful initial restoration of sinus rhythm but reverted to atrial fibrillation 8 minutes later refractory to additional shock.  -Patient anticoagulated with Lovenox  -He is on IV amiodarone  -5/12/2022-2D echo EF 54%,  -5/17/2022-transesophageal echocardiogram EF 55%. No evidence of interatrial thrombus. #Diffuse large B-cell lymphoma, stage IIIB, ABC phenotype (c-Myc(-), BCL-2 (+), BCL6(-)rearrangement)  -3/28/2022-excisional node biopsy by Dr. Reina Arriola, [facility].  -Path consistent with ABC phenotype DLBCL: Lymphoma as per Dr. Sita Tolbert. Awaiting final IHC studies from [facility]. -PET scan and bone marrow biopsy-ordered today on 4/4/2022.  -Persistent B symptoms.  - PET scan/bone marrow biopsy on 4/4/2022. (Negative for lymphoma involvement)  Extensive lymphadenopathy consistent with lymphoma. Many of these nodes are extremely metabolically active including SUV measurements of greater than 40-50 in some of the nodes. There is extensive adenopathy in the lower cervical chains including level 3, 4 and 5 nodes. Supraclavicular lymphadenopathy is present with extension into the superior mediastinum. Both anterior and middle mediastinal adenopathy as well as right hilar adenopathy is noted within the chest. A right retrocrural node, gastrohepatic ligament nodes and bulky left para-aortic nodes are also present all demonstrating intense abnormal metabolic activity. -4/29/2022-status post 2 cycles of R-CHOP    Pulmonary interstitial fibrosis-I reviewed the CT scan with the patient. He has complaints of short of breath on exertion. He has findings of interstitial fibrosis.   I offered a pulmonology consultation but he wants to defer this at this time. PLAN:  Continue anticoagulation  No oncologic intervention this time  We will postpone cycle #3 chemotherapy x 1 week    I have seen, examined and reviewed this patient medication list, appropriate labs and imaging studies. I reviewed relevant medical records and others physicians notes. I discussed the plans of care with the patient. I answered all the questions to the patients satisfaction. I have also reviewed the chief complaint (CC) and part of the history (History of Present Illness (HPI), Past Family Social History [facility]), or Review of Systems (ROS) and made changes when appropriated.        (Please note that portions of this note were completed with a voice recognition program. Efforts were made to edit the dictations but occasionally words are mis-transcribed.)      Jocelyn Fields MD    05/17/22  5:12 PM

## 2022-05-17 NOTE — PROGRESS NOTES
Patient is currently admitted to hospital at this time. Wife called concerning fridays chemo appt. I will keep fridays appt at this time. Told wife will we wait to see what happens between now and Friday before we cancel his appt. Patients wife agrees with plan at this time.  Electronically signed by Alicia Mccall RN on 5/17/2022 at 10:50 AM

## 2022-05-17 NOTE — PROGRESS NOTES
4 Eyes Skin Assessment    Eliana Diaz is being assessed upon: Admission    I agree that I, Malaika Villafuerte, RN, along with *** (either 2 RN's or 1 LPN and 1 RN) have performed a thorough Head to Toe Skin Assessment on the patient. ALL assessment sites listed below have been assessed. Areas assessed by both nurses:     [x]   Head, Face, and Ears   [x]   Shoulders, Back, and Chest  [x]   Arms, Elbows, and Hands   [x]   Coccyx, Sacrum, and Ischium  [x]   Legs, Feet, and Heels    Does the Patient have Skin Breakdown?  No    Nic Prevention initiated: No  Wound Care Orders initiated: No    WOC nurse consulted for Pressure Injury (Stage 3,4, Unstageable, DTI, NWPT, and Complex wounds) and New or Established Ostomies: No        Primary Nurse eSignature: Luz Maria Terry RN on 5/16/2022 at 9:38 PM      Co-Signer eSignature: {Esignature:[id number]}

## 2022-05-17 NOTE — ED NOTES
Dr. Mallory Gama aware via phone at this time that pt has been maxed out on Cardizem x 45 minutes and pt is still tachycardic at 170 BPM and BP is holding at 100/72. New orders obtained and entered by provider.       Maycol Granados RN  05/16/22 1952

## 2022-05-17 NOTE — PROGRESS NOTES
Uma Omer arrived to room # 24-42-46-23   Presented with: Atrial Flutter with RVR  Mental Status: Patient is oriented, alert, coherent and logical.   Vitals:    05/16/22 2107   BP: (!) 122/90   Pulse: 166   Resp: 18   Temp: 98.4 °F (36.9 °C)   SpO2: 97%     Patient safety contract and falls prevention contract reviewed with patient Yes. Oriented Patient to room. Call light within reach. Yes.   Needs, issues or concerns expressed at this time: no.      Electronically signed by Gita Sparrow RN on 5/16/2022 at 9:34 PM

## 2022-05-17 NOTE — ED NOTES
Dr. Raudel Ibarra aware that pt's HR remains 169 and Cardizem GTT is maxed out at 15 mg/ hr. No new orders obtained.       Dillan Singh RN  05/16/22 4444

## 2022-05-17 NOTE — PROGRESS NOTES
Pt flipping sr/flutter 's. freq with mult pauses.  notified.  Unless pt is symptomatic or low b/p pt does not need to be sent to [email or web address] rn

## 2022-05-17 NOTE — ED NOTES
Per Dr. Patrice Carranza, pt's Cardizem GTT to be d/c'd when Amiodarone is started.       Kati Steve RN  05/1950

## 2022-05-17 NOTE — PROCEDURES
Cardiology ARIADNA/cardioversion pulmonary findings  Transesophageal echo performed no evidence of intracardiac thrombus  Cardioversion performed 2 shocks administered for successful in restoring sinus rhythm but reverted to atrial fibrillation 7 to 8 minutes later additional shot given which was unsuccessful

## 2022-05-17 NOTE — ED NOTES
Dr. Nova Dia at b/s, cardizem GTT initiated. Per Dr. Nova Dia, pt advised to eat because he will be NPO after midnight.  Pt's wife went to get him food as authorized by MD. Anselm Schlatter, RN  05/16/22 9248

## 2022-05-17 NOTE — PROGRESS NOTES
Subjective:      Patient ID: Angela Moore is a 70 y.o. male. HPI   Patient reports feeling better today with less shortness of breath and fatigue. Review of Systems  As per HPI rest of ROS is negative  Objective:   Physical Exam  HENT:      Head: Normocephalic and atraumatic. Nose: Nose normal.   Eyes:      Pupils: Pupils are equal, round, and reactive to light. Neck:      Vascular: No carotid bruit. Cardiovascular:      Rate and Rhythm: Regular rhythm. Pulmonary:      Effort: Pulmonary effort is normal.      Breath sounds: Normal breath sounds. No wheezing or rales. Abdominal:      General: Abdomen is flat. There is no distension. Palpations: Abdomen is soft. Tenderness: There is no abdominal tenderness. Musculoskeletal:         General: Normal range of motion. Left lower leg: No edema. Skin:     General: Skin is warm and dry. Neurological:      General: No focal deficit present. Mental Status: He is alert. He is disoriented. Psychiatric:         Mood and Affect: Mood normal.         Assessment:    A/P  Pt today is in Afib however his rate is still not controlled. Discussed options including medical therapy vs ARIADNA guided cardioversion and he is in favor of the latter. He understands the recommendation for anticoagulation following the procedure and the risks involved including stroke. Plan:      AP proceed with cardioversion today with possible discharge thereafter and follow up for consideration of eventual medical therapy or ablation  Will plan to change amlodipine to oral diltiazem for rhythm control and BP maintenance. Would recommend anticoagulation for at least a month pending outpatient monitoring.          Zechariah Ruffin MD

## 2022-05-17 NOTE — ED NOTES
MICHELA Grace at b/s at this time. Cardizem gtt d/c'd. Amiodarone bolus started.       Anselm Schlatter, RN  05/16/22 2010

## 2022-05-17 NOTE — PLAN OF CARE
Problem: Discharge Planning  Goal: Discharge to home or other facility with appropriate resources  Outcome: Progressing  Flowsheets (Taken 5/16/2022 2153)  Discharge to home or other facility with appropriate resources: Identify barriers to discharge with patient and caregiver     Problem: Safety - Adult  Goal: Free from fall injury  Outcome: Progressing

## 2022-05-18 ENCOUNTER — TELEPHONE (OUTPATIENT)
Dept: PRIMARY CARE CLINIC | Age: 72
End: 2022-05-18

## 2022-05-18 VITALS
RESPIRATION RATE: 18 BRPM | SYSTOLIC BLOOD PRESSURE: 121 MMHG | HEIGHT: 67 IN | WEIGHT: 162.4 LBS | BODY MASS INDEX: 25.49 KG/M2 | TEMPERATURE: 97 F | DIASTOLIC BLOOD PRESSURE: 70 MMHG | HEART RATE: 82 BPM | OXYGEN SATURATION: 94 %

## 2022-05-18 LAB
ANION GAP SERPL CALCULATED.3IONS-SCNC: 11 MMOL/L (ref 7–19)
BASOPHILS ABSOLUTE: 0.2 K/UL (ref 0–0.2)
BASOPHILS RELATIVE PERCENT: 1.4 % (ref 0–1)
BUN BLDV-MCNC: 26 MG/DL (ref 8–23)
CALCIUM SERPL-MCNC: 8.1 MG/DL (ref 8.8–10.2)
CHLORIDE BLD-SCNC: 105 MMOL/L (ref 98–111)
CO2: 21 MMOL/L (ref 22–29)
CREAT SERPL-MCNC: 0.7 MG/DL (ref 0.5–1.2)
EKG P AXIS: 69 DEGREES
EKG P-R INTERVAL: 136 MS
EKG Q-T INTERVAL: 398 MS
EKG QRS DURATION: 128 MS
EKG QTC CALCULATION (BAZETT): 422 MS
EKG T AXIS: 39 DEGREES
EOSINOPHILS ABSOLUTE: 0.1 K/UL (ref 0–0.6)
EOSINOPHILS RELATIVE PERCENT: 0.7 % (ref 0–5)
GFR AFRICAN AMERICAN: >59
GFR NON-AFRICAN AMERICAN: >60
GLUCOSE BLD-MCNC: 104 MG/DL (ref 74–109)
HCT VFR BLD CALC: 33.6 % (ref 42–52)
HEMOGLOBIN: 10.5 G/DL (ref 14–18)
IMMATURE GRANULOCYTES #: 0.1 K/UL
LYMPHOCYTES ABSOLUTE: 1.2 K/UL (ref 1.1–4.5)
LYMPHOCYTES RELATIVE PERCENT: 11.4 % (ref 20–40)
MCH RBC QN AUTO: 30.1 PG (ref 27–31)
MCHC RBC AUTO-ENTMCNC: 31.3 G/DL (ref 33–37)
MCV RBC AUTO: 96.3 FL (ref 80–94)
MONOCYTES ABSOLUTE: 1.2 K/UL (ref 0–0.9)
MONOCYTES RELATIVE PERCENT: 11.4 % (ref 0–10)
NEUTROPHILS ABSOLUTE: 7.9 K/UL (ref 1.5–7.5)
NEUTROPHILS RELATIVE PERCENT: 74.2 % (ref 50–65)
PDW BLD-RTO: 20.3 % (ref 11.5–14.5)
PLATELET # BLD: 274 K/UL (ref 130–400)
PMV BLD AUTO: 10.3 FL (ref 9.4–12.4)
POTASSIUM SERPL-SCNC: 4.3 MMOL/L (ref 3.5–5)
RBC # BLD: 3.49 M/UL (ref 4.7–6.1)
SODIUM BLD-SCNC: 137 MMOL/L (ref 136–145)
WBC # BLD: 10.6 K/UL (ref 4.8–10.8)

## 2022-05-18 PROCEDURE — 2580000003 HC RX 258

## 2022-05-18 PROCEDURE — 36415 COLL VENOUS BLD VENIPUNCTURE: CPT

## 2022-05-18 PROCEDURE — 6360000002 HC RX W HCPCS: Performed by: INTERNAL MEDICINE

## 2022-05-18 PROCEDURE — 93010 ELECTROCARDIOGRAM REPORT: CPT | Performed by: INTERNAL MEDICINE

## 2022-05-18 PROCEDURE — 99231 SBSQ HOSP IP/OBS SF/LOW 25: CPT | Performed by: INTERNAL MEDICINE

## 2022-05-18 PROCEDURE — 85025 COMPLETE CBC W/AUTO DIFF WBC: CPT

## 2022-05-18 PROCEDURE — 93005 ELECTROCARDIOGRAM TRACING: CPT | Performed by: INTERNAL MEDICINE

## 2022-05-18 PROCEDURE — 6370000000 HC RX 637 (ALT 250 FOR IP)

## 2022-05-18 PROCEDURE — 80048 BASIC METABOLIC PNL TOTAL CA: CPT

## 2022-05-18 PROCEDURE — 99232 SBSQ HOSP IP/OBS MODERATE 35: CPT | Performed by: INTERNAL MEDICINE

## 2022-05-18 PROCEDURE — 6370000000 HC RX 637 (ALT 250 FOR IP): Performed by: INTERNAL MEDICINE

## 2022-05-18 RX ORDER — DILTIAZEM HYDROCHLORIDE 120 MG/1
120 CAPSULE, COATED, EXTENDED RELEASE ORAL DAILY
Qty: 30 CAPSULE | Refills: 3 | Status: SHIPPED | OUTPATIENT
Start: 2022-05-19 | End: 2022-06-02 | Stop reason: SDUPTHER

## 2022-05-18 RX ORDER — AMIODARONE HYDROCHLORIDE 200 MG/1
200 TABLET ORAL 2 TIMES DAILY
Qty: 14 TABLET | Refills: 0 | Status: SHIPPED | OUTPATIENT
Start: 2022-05-18 | End: 2022-06-02 | Stop reason: SDUPTHER

## 2022-05-18 RX ORDER — AMIODARONE HYDROCHLORIDE 200 MG/1
200 TABLET ORAL DAILY
Qty: 30 TABLET | Refills: 0 | Status: SHIPPED | OUTPATIENT
Start: 2022-05-25 | End: 2022-05-25

## 2022-05-18 RX ADMIN — ALLOPURINOL 300 MG: 300 TABLET ORAL at 08:04

## 2022-05-18 RX ADMIN — FERROUS SULFATE TAB 325 MG (65 MG ELEMENTAL FE) 325 MG: 325 (65 FE) TAB at 08:04

## 2022-05-18 RX ADMIN — ENOXAPARIN SODIUM 70 MG: 100 INJECTION SUBCUTANEOUS at 08:05

## 2022-05-18 RX ADMIN — ASPIRIN 81 MG 81 MG: 81 TABLET ORAL at 08:05

## 2022-05-18 RX ADMIN — MULTIPLE VITAMINS W/ MINERALS TAB 1 TABLET: TAB at 08:05

## 2022-05-18 RX ADMIN — AMIODARONE HYDROCHLORIDE 200 MG: 200 TABLET ORAL at 08:05

## 2022-05-18 RX ADMIN — Medication 1000 UNITS: at 08:05

## 2022-05-18 RX ADMIN — SODIUM CHLORIDE, PRESERVATIVE FREE 10 ML: 5 INJECTION INTRAVENOUS at 08:05

## 2022-05-18 RX ADMIN — DILTIAZEM HYDROCHLORIDE 120 MG: 120 CAPSULE, COATED, EXTENDED RELEASE ORAL at 08:05

## 2022-05-18 RX ADMIN — ZINC SULFATE 220 MG (50 MG) CAPSULE 50 MG: CAPSULE at 08:04

## 2022-05-18 ASSESSMENT — ENCOUNTER SYMPTOMS
SINUS PRESSURE: 0
ABDOMINAL PAIN: 0
SHORTNESS OF BREATH: 0
DIARRHEA: 0
VOMITING: 0
RHINORRHEA: 0
SORE THROAT: 0
NAUSEA: 0

## 2022-05-18 NOTE — PROGRESS NOTES
MEDICAL ONCOLOGY PROGRESS NOTE     Pt Name: Gigi Garrison  MRN: [id number]  YOB: 1950  Date of evaluation: 5/18/2022    Subjective:feels better, no palpitaion    HISTORY OF PRESENT ILLNESS:    Diagnosed this admission  -A. fib with RVR  Diffuse large B-cell lymphoma      Patient is a very pleasant 70years old male who has been diagnosed with diffuse large B-cell lymphoma stage IIIb. He was started on chemotherapy with R-CHOP. Patient has several comorbidities to include a history of hypertension. Presents to the hospital with complaints of palpitation. Was noted to have heart rate on the 160s. He was in atrial flutter with rapid ventricular response. Patient had complained of dizziness and lightheadedness. Denied any febrile episode. Denies any chest pain. BNP was 598, troponin negative. Cardiology was consulted. Patient started amiodarone drip. He was anticoagulated. He underwent transesophageal echocardiogram that showed normal EF. No evidence of intra-atrial thrombus. Patient was shocked 3 times but unsuccessful. Prior oncological history:  The patient was seen by ENT and underwent excisional biopsy of a neck lymph node. Results of pathology are not available yet. I called and discussed with pathologist, Dr. Durga Campuzano. She is concerned that these represent a high-grade lymphoma. Further studies are ongoing at [facility] oncology. Final results likely expected for the end of the week. Preliminary diagnosis is lymphoma. Discussed the patient results of prior blood work. The patient continued to have symptoms of night sweats, fatigue. He received R-CHOP about 3 weeks ago. He tolerated treatment complains of fatigue. He has been eating well.      Diagnosis  Diffuse large B-cell lymphoma, March 2022  c-Myc, BCL6 and BCL2 rearrangement pending  Stage IIIB     Treatment summary  4/8/2022- Initiated R-CHOP + GCSF every 3 weeks x 6 cycles     Hematology history  Angelina Montez was first seen by me on 3/24/2022. The patient was referred by his primary care provider for findings of generalized lymphadenopathy. The patient has had symptoms of weight loss, night sweats and low-grade fever. This seems has been going on for many months now. Patient had Covid 19 infection last year. He had CT scans that showed generalized adenopathy. 3/22/2022-CT soft tissue neck showed findings of cervical adenopathy identified, including a right level 3 node measuring 1.6 cm in greatest axial diameter (series 4-image 95). There is a left level 5 lymph node measuring 1.7 cm on axial image 102. Multiple enlarged left level 4 nodes, including a 2.3 cm node on axial image 114. Adenopathy extends down into the left supraclavicular fossa and there is bulky adenopathy also appreciated in the upper mediastinum. 3/22/2022-CT chest with contrast showed findings of mediastinal adenopathy. Subcarinal lymph nodes are present. Right hilar lymph nodes are present. Left para-aortic adenopathy is present in the abdomen. Concern for lymphoma. 3/22/2002-CT abdomen/pelvis showed spleen is enlarged measuring 14 cm craniocaudal dimension. Bilateral renal cysts including dominant 5.4 cm RIGHT upper pole exophytic renal cyst. 3 mm nonobstructing LEFT lower pole renal calculus. Prostate is mildly enlarged measuring 4.8 cm transverse dimension. Bulky retroperitoneal lymphadenopathy is noted. Noted conglomerate in the LEFT periaortic region on image 34 series 4 measures 6.0 x 3.8 cm. Enlarged gastrohepatic ligament lymph nodes with reference node on image 23 measuring 14 mm short axis dimension. No enlarged pelvic or inguinal lymph nodes identified. Bilateral chronic L5 pars defects with grade 1 anterolisthesis of L5 on S1. No acute osseous finding. 3/24/2022- (H), hemoglobin 10.1/MCV 93, platelets 205,482, WBC 8.7  3/24/2022-she was first seen by me. Recommended excisional biopsy left supraclavicular lymph node.   3/24/2022 B2M 2.8, Uric Acid 3.6,   3/28/2022 Left Cervical Lymphadenopathy (B[facility]): The dominant nodes are seen in the left level 4  and left level 5 neck as well as in the left supraclavicular fossa and upper mediastinum. Degenerative spinal stenosis. Atheromatous calcification in the carotid bulbs. 3/28/2022-excisional anibal biopsy. Final pathology pending. Preliminary results suggestive of high-grade lymphoma. Recommend allopurinol 300 mg p.o. daily. Recommend a PET scan and bone marrow biopsy. 4/4/2022-IHC studies from excisional node biopsy consistent with diffuse large B-cell lymphoma, ABC phenotype. BCL2, BCL6 and c-Myc rearrangement in process. Recommended R-CHOP x6 cycles. Started on allopurinol. 4/6/22 Bone marrow ([facility]): BONE MARROW: Normocellular (30-35% cellular) marrow with multilineage hematopoiesis. Negative for lymphoid infiltrates. Negative for dysplasia, no increase in blasts. Storage iron present, no ring sideroblasts. FLOW CYTOMETRY: No B-cell monoclonality and no T-cell aberrant antigenic expression. The blasts are not increased. CYTOGENETICS: Normal male karyotype. 4/7/22 PET CT SKULL BASE TO MID THIGH  Extensive lymphadenopathy consistent with lymphoma. Many of these nodes are extremely metabolically active including SUV measurements of greater than 40-50 in some of the nodes. There is extensive adenopathy in the lower cervical chains including level 3, 4 and 5 nodes. Supraclavicular lymphadenopathy is present with extension into the superior mediastinum. Both anterior and middle mediastinal adenopathy as well as right hilar adenopathy is noted within the chest. A right retrocrural node, gastrohepatic ligament nodes and bulky left para-aortic nodes are also present all demonstrating intense abnormal metabolic activity.   4/8/2022- Initiated R-CHOP + GCSF every 3 weeks x 6 cycles       Past Medical History:    Past Medical History:   Diagnosis Date    Cancer ([address]     lymphoma Chronic neck pain     Herniated disc, cervical     HTN (hypertension)        Past Surgical History:    Past Surgical History:   Procedure Laterality Date    LYMPH NODE BIOPSY      left side of neck    PORT SURGERY N/A 4/8/2022    SINGLE LUMEN PORT PLACEMENT WITH US & FLUORO performed by Kendrick Singh MD at [place] [address] History:    Marital status:  Smoking status:Former smoker, Quit more 25 years ago  ETOH status:No   Resides:[place]    Family History:   Family History   Problem Relation Age of Onset    Cancer Mother         lymphoma    Heart Disease Father        Current Hospital Medications:    Current Facility-Administered Medications   Medication Dose Route Frequency Provider Last Rate Last Admin    amiodarone (CORDARONE) tablet 200 mg  200 mg Oral BID Emmanuel Barraza MD   200 mg at 05/17/22 1527    dilTIAZem (CARDIZEM CD) extended release capsule 120 mg  120 mg Oral Daily Emmanuel Barraza MD   120 mg at 05/17/22 1527    0.9 % sodium chloride infusion   IntraVENous Continuous Maury Jimenez MD 1,000 mL/hr at 05/16/22 2313 New Bag at 05/16/22 2313    dilTIAZem HCl in sodium chloride 125 mg / 125 mL infusion  2.5-15 mg/hr IntraVENous Continuous Maury Jimenez MD 5 mL/hr at 05/18/22 0432 5 mg/hr at 05/18/22 0432    enoxaparin (LOVENOX) injection 70 mg  1 mg/kg SubCUTAneous BID Betty Hernandez MD   70 mg at 05/17/22 1959    sodium chloride flush 0.9 % injection 5-40 mL  5-40 mL IntraVENous 2 times per day Axel Martin, APRN - CNP   10 mL at 05/17/22 1959    sodium chloride flush 0.9 % injection 5-40 mL  5-40 mL IntraVENous PRN Axel Martin, APRN - CNP        0.9 % sodium chloride infusion   IntraVENous PRN Axel Martin, APRN - CNP        ondansetron (ZOFRAN-ODT) disintegrating tablet 4 mg  4 mg Oral Q8H PRN Axel Martin, APRN - CNP        Or    ondansetron (ZOFRAN) injection 4 mg  4 mg IntraVENous Q6H PRN Axel Martin, APRN - CNP        polyethylene glycol (GLYCOLAX) packet 17 g  17 g Oral Daily PRN Viawayne Da Silva, APRN - CNP        acetaminophen (TYLENOL) tablet 650 mg  650 mg Oral Q6H PRN Viawayne Da Silva, APRN - CNP   650 mg at 05/17/22 1958    Or    acetaminophen (TYLENOL) suppository 650 mg  650 mg Rectal Q6H PRN Viawayne Leosstkishore, APRN - CNP        allopurinol (ZYLOPRIM) tablet 300 mg  300 mg Oral Daily Viawayne Da Silva, APRN - CNP   300 mg at 05/17/22 1000    aspirin chewable tablet 81 mg  81 mg Oral Daily Viawayne Da Silva, APRN - CNP   81 mg at 05/17/22 3972    [Held by provider] lisinopril (PRINIVIL;ZESTRIL) tablet 20 mg  20 mg Oral Daily Viawayne Da Silva, APRN - BIBI        ferrous sulfate (IRON 325) tablet 325 mg  325 mg Oral Daily with breakfast Viawayne Da Silva, APRN - CNP   325 mg at 05/17/22 2773    therapeutic multivitamin-minerals 1 tablet  1 tablet Oral Daily Viawayne Da Silva, APRN - CNP   1 tablet at 05/17/22 0831    niacin (NIASPAN) extended release tablet 500 mg  500 mg Oral Nightly Viawayne Da Silva, APRN - CNP   500 mg at 05/17/22 1958    Vitamin D (CHOLECALCIFEROL) tablet 1,000 Units  1,000 Units Oral Daily Viawayne Da Silva, APRN - CNP   1,000 Units at 05/17/22 5274    zinc sulfate (ZINCATE) capsule 50 mg  50 mg Oral Daily Viawayne Da Silva, APRN - CNP   50 mg at 05/17/22 3061    amiodarone (CORDARONE) 450 mg in dextrose 5 % 250 mL infusion  0.5 mg/min IntraVENous Continuous Valencia Lopez MD 16.7 mL/hr at 05/17/22 2036 0.5 mg/min at 05/17/22 2036       Allergies: No Known Allergies        Objective   /68   Pulse 88   Temp 98 °F (36.7 °C) (Temporal)   Resp 18   Ht 5' 7\" (1.702 m)   Wt 162 lb 6.4 oz (73.7 kg)   SpO2 97%   BMI 25.44 kg/m²     PHYSICAL EXAM:  CONSTITUTIONAL: Alert, appropriate, no acute distress  EYES: Non icteric, EOM intact, pupils equal round   ENT: Mucus membranes moist,external inspection of ears and nose are normal  NECK: Supple, no masses.   No palpable thyroid mass  CHEST/LUNGS: CTA bilaterally, normal respiratory effort   CARDIOVASCULAR: Irregular heart rhythm,, no murmurs. No lower extremity edema  ABDOMEN: soft non-tender, active bowel sounds, no HSM. No palpable masses  EXTREMITIES: warm, full ROM in all 4 extremities, no focal weakness. SKIN: warm, dry with no rashes or lesions  LYMPH: No cervical, clavicular, axillary, or inguinal lymphadenopathy  NEUROLOGIC: follows commands, non focal   PSYCH: mood and affect appropriate. Alert and oriented to time, place, person        LABORATORY RESULTS REVIEWED/ANALYZED BY ME:  Recent Labs     05/18/22  0122 05/17/22  0215 05/16/22  1705   WBC 10.6 11.6* 13.8*   HGB 10.5* 11.4* 13.3*   HCT 33.6* 35.9* 41.7*   MCV 96.3* 95.0* 95.4*    237 275       Lab Results   Component Value Date     05/18/2022    K 4.3 05/18/2022     05/18/2022    CO2 21 (L) 05/18/2022    BUN 26 (H) 05/18/2022    CREATININE 0.7 05/18/2022    GLUCOSE 104 05/18/2022    CALCIUM 8.1 (L) 05/18/2022    PROT 6.7 05/16/2022    LABALBU 3.8 05/16/2022    BILITOT <0.2 05/16/2022    ALKPHOS 100 05/16/2022    AST 17 05/16/2022    ALT 11 05/16/2022    LABGLOM >60 05/18/2022    GFRAA >59 05/18/2022    AGRATIO 1.5 09/19/2016    GLOB 3.7 04/29/2022       No results found for: INR, PROTIME    RADIOLOGY STUDIES REPORT/REVIEWED AND INTERPRETED BY ME:  ECHO Transesophageal    Result Date: 5/17/2022   Study Location: Cath Lab Technical Quality: Adequate visualization Patient Status: Inpatient Rhythm: Atrial fibrillation Indications:Atrial fibrillation. Conclusions   Summary  Anesthesia start time: 6901  Anesthesia stop time: 5959  Mitral valve leaflets are mildly thickened with preserved leaflet  mobility. Mild mitral regurgitation is present. Mildly thickened aortic valve leaflets with preserved leaflet mobility. Moderate TR  No evidence of thrombus or spontaneous contrast noted within the JAZMINE.   Normal intact intra-atrial septum was noted with no evidence of  significant intra-atrial communications by color flow Doppler or by  agitated saline study.  Normal left ventricular size with preserved LV function and an estimated  ejection fraction of approximately 55%. No evidence of left ventricular mass or thrombus noted. Evidence of mild atherosclerosis noted involving the the descending aorta. Negative bubble study     ECHO Complete 2D W Doppler W Color    Result Date: 5/12/2022  152/76 mmHg Indications:Chemotherapy. Conclusions   Summary  Left ventricle normal chamber size and thickness  Preserved systolic function with estimated ejection fraction of 54%  However, GLS is slightly decreased especially the apex. 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 Judging from stroke-volume  index which is 27.7, which is reduced. This may represent low gradient,  normal ejection fraction, low stroke-volume significant aortic stenosis. If patient is symptomatic, please refer to cardiology for ARIADNA         XR CHEST PORTABLE    Result Date: 5/16/2022  EXAMINATION: XR CHEST PORTABLE 5/16/2022 6:30 PM HISTORY: XR CHEST PORTABLE 5/16/2022 5:24 PM HISTORY: Chest pain COMPARISON: CT scan March 15, 2022. FINDINGS: Chronic interstitial fibrotic changes noted in the periphery of the right and left lung. . Cardiac silhouettes mild moderately enlarged. Slight widening the mediastinum is noted. Most likely this associated with mediastinal adenopathy. . The osseous structures and surrounding soft tissues demonstrate no acute abnormality. 1. Chronic change in the pulmonary parenchyma with no acute cardiopulmonary process. 2. Widened mediastinum consistent with mediastinal adenopathy.  Signed by Dr Dasha Wallace'    ASSESSMENT:  #A. fib with RVR  -5/17/2022-cardioversion performed 2 shocks with successful initial restoration of sinus rhythm but this note were completed with a voice recognition program. Efforts were made to edit the dictations but occasionally words are mis-transcribed.)      rBunilda Pascal MD    05/18/22  7:03 AM

## 2022-05-18 NOTE — ED PROVIDER NOTES
[facility]  eMERGENCY dEPARTMENT eNCOUnter      Pt Name: Pastor Damon  MRN: [id number]  [place] 1950  Date of evaluation: 5/16/2022  Provider: Zaheer Whitman MD    CHIEF COMPLAINT       Chief Complaint   Patient presents with    Tachycardia     Pt states blood presure monitor was reading low and heart rate was racing. HISTORY OF PRESENT ILLNESS   (Location/Symptom, Timing/Onset,Context/Setting, Quality, Duration, Modifying Factors, Severity)  Note limiting factors. Pastor Damon is a 70 y.o. male who presents to the emergency department tachycardia    HPI     Patient is a 77-year-old white male with a history of B-cell lymphoma who presents with palpitations and found to have a heart rate in the 170s. This is a new diagnosis for him. He recently had a echocardiogram to evaluate him for the appropriateness of some chemotherapy for his B-cell lymphoma. He is in no respiratory distress. He thinks he has been in this rhythm for a few days. He denies lightheadedness; chest pain; nausea; vomiting; fever. NursingNotes were reviewed. REVIEW OF SYSTEMS    (2-9 systems for level 4, 10 or more for level 5)     Review of Systems   Constitutional: Negative for chills, diaphoresis, fatigue and fever. HENT: Negative for rhinorrhea, sinus pressure and sore throat. Eyes: Negative for visual disturbance. Respiratory: Negative for shortness of breath. Cardiovascular: Positive for palpitations. Negative for chest pain. Gastrointestinal: Negative for abdominal pain, diarrhea, nausea and vomiting. Genitourinary: Negative for difficulty urinating and dysuria. Musculoskeletal: Negative for arthralgias and myalgias. Skin: Negative for rash. Neurological: Positive for dizziness. Negative for weakness. Psychiatric/Behavioral: Negative for confusion. All other systems reviewed and are negative.            PAST MEDICALHISTORY     Past Medical History:   Diagnosis Date    Cancer (HCC)     lymphoma    Chronic neck pain     Herniated disc, cervical     HTN (hypertension)          SURGICAL HISTORY       Past Surgical History:   Procedure Laterality Date    LYMPH NODE BIOPSY      left side of neck    PORT SURGERY N/A 4/8/2022    SINGLE LUMEN PORT PLACEMENT WITH US & FLUORO performed by Kendrick Singh MD at [address] [place] [address]     Current Discharge Medication List      CONTINUE these medications which have NOT CHANGED    Details   lidocaine-prilocaine (EMLA) 2.5-2.5 % cream Apply topically to port area and cover with plastic wrap one hour prior to treatment. Qty: 1 each, Refills: 1    Associated Diagnoses: Lymphoma of lymph nodes of multiple sites ([facility] [address]  Poor venous access      ondansetron (ZOFRAN) 4 MG tablet Take 1 tablet by mouth every 8 hours as needed for Nausea or Vomiting  Qty: 30 tablet, Refills: 1      allopurinol (ZYLOPRIM) 300 MG tablet Take 1 tablet by mouth daily  Qty: 30 tablet, Refills: 5    Associated Diagnoses: Lymphoma of lymph nodes of multiple sites (HCC)      promethazine-dextromethorphan (PROMETHAZINE-DM) 6.25-15 MG/5ML syrup 1-2 tsp every hs  prn cough  Qty: 120 mL, Refills: 0      turmeric (QC TUMERIC COMPLEX) 500 MG CAPS Take by mouth daily      zinc gluconate 50 MG tablet Take 50 mg by mouth daily      ferrous sulfate (IRON 325) 325 (65 Fe) MG tablet Take 325 mg by mouth daily (with breakfast)      KRILL OIL PO Take by mouth      Red Yeast Rice Extract (RED YEAST RICE PO) Take by mouth      !! Multiple Vitamins-Minerals (PRESERVISION AREDS) TABS Take by mouth      Probiotic Product (PROBIOTIC-10 PO) Take by mouth      Zn-Pyg Afri-Nettle-Saw Palmet (SAW PALMETTO COMPLEX PO) Take by mouth      amLODIPine (NORVASC) 5 MG tablet TAKE 1 TABLET NIGHTLY  Qty: 90 tablet, Refills: 3      benazepril (LOTENSIN) 20 MG tablet TAKE 1 TABLET EVERY DAY  Qty: 90 tablet, Refills: 3      Coenzyme Q10 (CO Q 10 PO) Take by mouth      !! Multiple Vitamins-Minerals (THERAPEUTIC MULTIVITAMIN-MINERALS) tablet Take 1 tablet by mouth daily      Vitamin D (CHOLECALCIFEROL) 1000 UNITS CAPS capsule Take 1,000 Units by mouth daily       niacin 500 MG CR capsule Take 500 mg by mouth nightly       aspirin 81 MG tablet Take 81 mg by mouth daily       ! ! - Potential duplicate medications found. Please discuss with provider. ALLERGIES     Patient has no known allergies. FAMILY HISTORY       Family History   Problem Relation Age of Onset    Cancer Mother         lymphoma    Heart Disease Father           SOCIAL HISTORY       Social History     Socioeconomic History    Marital status:      Spouse name: Not on file    Number of children: Not on file    Years of education: Not on file    Highest education level: Not on file   Occupational History    Not on file   Tobacco Use    Smoking status: Former Smoker     Packs/day: 2.00     Years: 15.00     Pack years: 30.00     Types: Cigarettes     Quit date: 26     Years since quittin.4    Smokeless tobacco: Never Used   Vaping Use    Vaping Use: Never used   Substance and Sexual Activity    Alcohol use: Yes     Comment: Occasional     Drug use: No    Sexual activity: Not on file   Other Topics Concern    Not on file   Social History Narrative    Not on file     Social Determinants of Health     Financial Resource Strain:     Difficulty of Paying Living Expenses: Not on file   Food Insecurity:     Worried About [address] [employer] in the Last Year: Not on file    [address] [facility] [address] in the Last Year: Not on file   Transportation Needs:     Lack of Transportation (Medical): Not on file    Lack of Transportation (Non-Medical):  Not on file   Physical Activity:     Days of Exercise per Week: Not on file    Minutes of Exercise per Session: Not on file   Stress:     Feeling of Stress : Not on file   Social Connections:     Frequency of Communication with Friends and Family: Not on file    Frequency of Social Skin:     General: Skin is warm and dry. Neurological:      Mental Status: He is alert and oriented to person, place, and time. Psychiatric:         Behavior: Behavior normal.         Thought Content: Thought content normal.         Judgment: Judgment normal.         DIAGNOSTIC RESULTS     EKG: All EKG's areinterpreted by the Emergency Department Physician who either signs or Co-signs this chart in the absence of a cardiologist.    Initial EKG reveals atrial fibrillation with RVR with a rate of 170 narrow complex    Repeat EKG after diltiazem shows atrial fibrillation with a slowed ventricular rate of 146    RADIOLOGY:  Non-plain film images such as CT, Ultrasound and MRI are read by the radiologist. Plain radiographic images are visualized and preliminarily interpreted bythe emergency physician with the below findings:          XR CHEST PORTABLE   Final Result   1. Chronic change in the pulmonary parenchyma with no acute   cardiopulmonary process. 2. Widened mediastinum consistent with mediastinal adenopathy.    Signed by Dr Cleo Longoria:  Labs Reviewed   CBC WITH AUTO DIFFERENTIAL - Abnormal; Notable for the following components:       Result Value    WBC 13.8 (*)     RBC 4.37 (*)     Hemoglobin 13.3 (*)     Hematocrit 41.7 (*)     MCV 95.4 (*)     MCHC 31.9 (*)     RDW 20.7 (*)     Neutrophils % 76.3 (*)     Lymphocytes % 10.4 (*)     Monocytes % 10.6 (*)     Neutrophils Absolute 10.5 (*)     Monocytes Absolute 1.50 (*)     All other components within normal limits   COMPREHENSIVE METABOLIC PANEL W/ REFLEX TO MG FOR LOW K - Abnormal; Notable for the following components:    Glucose 116 (*)     All other components within normal limits   BASIC METABOLIC PANEL W/ REFLEX TO MG FOR LOW K - Abnormal; Notable for the following components:    CO2 20 (*)     Glucose 112 (*)     Calcium 8.0 (*)     All other components within normal limits   CBC - Abnormal; Notable for the following components: WBC 11.6 (*)     RBC 3.78 (*)     Hemoglobin 11.4 (*)     Hematocrit 35.9 (*)     MCV 95.0 (*)     MCHC 31.8 (*)     RDW 20.4 (*)     All other components within normal limits   COVID-19, RAPID   TROPONIN   BRAIN NATRIURETIC PEPTIDE   LACTIC ACID   TSH WITH REFLEX TO FT4   URINALYSIS WITH REFLEX TO CULTURE   TROPONIN   TROPONIN   BASIC METABOLIC PANEL   CBC WITH AUTO DIFFERENTIAL       All other labs were within normal range or not returned as of this dictation. EMERGENCY DEPARTMENT COURSE and DIFFERENTIAL DIAGNOSIS/MDM:   Vitals:    Vitals:    05/17/22 1732 05/17/22 1945 05/18/22 0017 05/18/22 0023   BP: (!) 140/109  104/64 104/64   Pulse: 104 112 78 75   Resp: 18      Temp: 97.7 °F (36.5 °C)      TempSrc: Oral      SpO2: 97%  97% 98%   Weight:       Height:           MDM    Patient is in no respiratory distress in the emergency room. He was started on diltiazem both as a bolus and a drip which slowed his heart rate and he maintained an adequate blood pressure. Cardiology was consulted and saw the patient in the ED and recommended starting him on amiodarone and admitting him to the hospitalist for likely cardioversion in the morning. Reassessment      CONSULTS:  IP CONSULT TO CARDIOLOGY  IP CONSULT TO ONCOLOGY    PROCEDURES:  Unless otherwise noted below, none     Procedures    FINAL IMPRESSION      1. Atrial fibrillation with RVR ([facility] [address]          DISPOSITION/PLAN   DISPOSITION Admitted 05/16/2022 06:28:36 PM      PATIENT REFERRED TO:  No follow-up provider specified.     DISCHARGE MEDICATIONS:  Current Discharge Medication List             (Please note that portions of this note were completed with a voice recognition program.  Efforts were made to edit thedictations but occasionally words are mis-transcribed.)    Sury Gifford MD (electronically signed)  Attending Emergency Physician         Sury Gifford MD  05/18/22 1749

## 2022-05-18 NOTE — PROGRESS NOTES
Pt and family verbalize understanding of discharge instructions including prescriptions, medication list, and follow-up appointments. All questions answered. All personal belongings discharged with patient.     Electronically signed by Tabby Anand RN on 5/18/2022 at 11:22 AM

## 2022-05-18 NOTE — DISCHARGE SUMMARY
Gem Chris  :  1950  MRN:  [id number]    Admit date:  2022  Discharge date:  2022    Discharging Physician:  Dr. Vandana Bauer    Advance Directive: Full Code    Consults: IP CONSULT TO CARDIOLOGY  IP CONSULT TO ONCOLOGY     Primary Care Physician:  Sukhwinder Francisco, APRN - CNP    Discharge Diagnoses:  Principal Problem:    Atrial flutter with rapid ventricular response ([facility] [address]  Active Problems:    Essential hypertension    Large B-cell lymphoma ([facility] [address]  Resolved Problems:    * No resolved hospital problems. *      Portions of this note have been copied forward, however, changed to reflect the most current clinical status of this patient. Hospital Course: The patient is a 74 y. o. male who presented to [facility] ER on 2022 with PMH B-cell lymphoma, HTN, complaining of tachycardia. He denies a previous history of irregular HR.  He states that he has not felt well over the last few days. He took his BP on his home monitor the am of his admit and the monitor told him that his HR was 160. Denied any recent illnesses, chest pain, palpitations, fevers or chills. He became concerned and came to the ED for further evaluation. He did C/O dizziness with position change before coming to the ED. Further ED work-up revealedWBC 13.8, CMP WNL, troponin negative,  and UA was negative. EKG showed a-flutter 170's with RVR. Patient was started on a cardizem gtt and was maxed out at 15mg/hr. Cardiology was consulted and Amiodarone was initiated. For ARIADNA and cardioversion on 2022, which was unsuccessful. EP was consulted with the plan to control rate with oral rate control meds and anticoagulants. The patient did, however, convert to sinus rhythm overnight. Recommend amiodarone 200 mg twice daily x7 days, and 200 mg daily of amiodarone. Additionally recommend 120mg extended release Cardizem and daily Xarelto 20 mg. He has been cleared by cardiology to go home.   He has additionally been cleared by oncology to return home. New medications have been discussed with patient. He verbalizes understanding his labs and vitals remained stable. He is medically and clinically cleared for discharge. He has been advised to keep his scheduled follow-up appointments with cardiology and a hospital follow-up appointment with his PCP. He is additionally to keep his scheduled appointments with oncology and oncology treatments. He will be discharged home in stable condition. Significant Diagnostic Studies:   ECHO Transesophageal    Result Date: 5/17/2022  Transesophageal Echocardiography Report (ARIADNA)   Demographics   Patient Name  Sindy Robledo Date of Study         05/17/2022   MRN           [id number]           Gender                Male   Date of Birth 1950       Room Number           Alonzo Fried   Age           70 year(s)   Height:       67 inches        Referring Physician   Juli Hoffman MD   Weight:       159 pounds       Sonographer           Sharron Sandhoff, [facility]   BSA:          1.83 m^2         Interpreting          Juli Hoffman MD                                 Physician   BMI:          24.9 kg/m^2  Procedure Type of Study   ARIADNA procedure:ECHOCARDIOGRAM TRANSESOPHAGEAL. Study Location: Cath Lab Technical Quality: Adequate visualization Patient Status: Inpatient Rhythm: Atrial fibrillation Indications:Atrial fibrillation. Conclusions   Summary  Anesthesia start time: 8809  Anesthesia stop time: 4983  Mitral valve leaflets are mildly thickened with preserved leaflet  mobility. Mild mitral regurgitation is present. Mildly thickened aortic valve leaflets with preserved leaflet mobility. Moderate TR  No evidence of thrombus or spontaneous contrast noted within the JAZMINE. Normal intact intra-atrial septum was noted with no evidence of  significant intra-atrial communications by color flow Doppler or by  agitated saline study.   Normal left ventricular size with preserved LV function and an estimated ejection fraction of approximately 55%. No evidence of left ventricular mass or thrombus noted. Evidence of mild atherosclerosis noted involving the the descending aorta. Negative bubble study   Signature   ----------------------------------------------------------------  Electronically signed by Scar Love MD(Interpreting  physician) on 05/17/2022 02:03 PM  ----------------------------------------------------------------   Findings   Mitral Valve  Mitral valve leaflets are mildly thickened with preserved leaflet  mobility. Mild mitral regurgitation is present. Aortic Valve  Mildly thickened aortic valve leaflets with preserved leaflet mobility. Tricuspid Valve  Moderate TR   Pulmonic Valve  The pulmonic valve was not well visualized . Left Atrium  No evidence of thrombus or spontaneous contrast noted within the JAZMINE. Normal intact intra-atrial septum was noted with no evidence of  significant intra-atrial communications by color flow Doppler or by  agitated saline study. Left Ventricle  Normal left ventricular size with preserved LV function and an estimated  ejection fraction of approximately 55%. No evidence of left ventricular mass or thrombus noted. Right Atrium  Normal right atrial dimension with no evidence of thrombus or mass noted. Right Ventricle  Normal right ventricular size with preserved RV function. Pericardial Effusion  No evidence of significant pericardial effusion is noted. Pleural Effusion  No evidence of pleural effusion. Miscellaneous  Evidence of mild atherosclerosis noted involving the the descending aorta.   Negative bubble study  ARIADNA Performed By: the attending and the sonographer 2D Measurements and Calculations(cm)   % Ejection Fraction: 55 %  Doppler Measurements and Calculations                                           MV Mean velocity:  TR Velocity:228 cm/s  TR Gradient:20.79 mmHg      XR CHEST PORTABLE    Result Date: 5/16/2022  EXAMINATION: XR CHEST PORTABLE 5/16/2022 6:30 PM HISTORY: XR CHEST PORTABLE 5/16/2022 5:24 PM HISTORY: Chest pain COMPARISON: CT scan March 15, 2022. FINDINGS: Chronic interstitial fibrotic changes noted in the periphery of the right and left lung. . Cardiac silhouettes mild moderately enlarged. Slight widening the mediastinum is noted. Most likely this associated with mediastinal adenopathy. . The osseous structures and surrounding soft tissues demonstrate no acute abnormality. 1. Chronic change in the pulmonary parenchyma with no acute cardiopulmonary process. 2. Widened mediastinum consistent with mediastinal adenopathy. Signed by Dr Sanjuanita Jones      Pertinent Labs:   CBC:   Recent Labs     05/16/22 1705 05/17/22  0215 05/18/22  0122   WBC 13.8* 11.6* 10.6   HGB 13.3* 11.4* 10.5*    237 274     BMP:    Recent Labs     05/16/22 1705 05/17/22 0215 05/18/22  0122    137 137   K 4.1 4.0 4.3    108 105   CO2 23 20* 21*   BUN 21 15 26*   CREATININE 0.6 0.5 0.7   GLUCOSE 116* 112* 104     INR: No results for input(s): INR in the last 72 hours. Physical Exam:  Vital Signs: /70   Pulse 82   Temp 97 °F (36.1 °C) (Temporal)   Resp 18   Ht 5' 7\" (1.702 m)   Wt 162 lb 6.4 oz (73.7 kg)   SpO2 94%   BMI 25.44 kg/m²   Physical Exam  Vitals and nursing note reviewed. Constitutional:       General: He is not in acute distress. Appearance: Normal appearance. He is not ill-appearing. HENT:      Head: Normocephalic and atraumatic. Right Ear: External ear normal.      Left Ear: External ear normal.      Nose: Nose normal.      Mouth/Throat:      Mouth: Mucous membranes are moist.   Eyes:      Extraocular Movements: Extraocular movements intact. Conjunctiva/sclera: Conjunctivae normal.      Pupils: Pupils are equal, round, and reactive to light. Cardiovascular:      Rate and Rhythm: Normal rate and regular rhythm. Pulses: Normal pulses. Heart sounds: Normal heart sounds.    Pulmonary: Effort: Pulmonary effort is normal. No respiratory distress. Breath sounds: Normal breath sounds. No wheezing, rhonchi or rales. Abdominal:      General: Bowel sounds are normal. There is no distension. Palpations: Abdomen is soft. Tenderness: There is no abdominal tenderness. Musculoskeletal:         General: No swelling, tenderness or deformity. Normal range of motion. Cervical back: Normal range of motion and neck supple. No muscular tenderness. Right lower leg: No edema. Left lower leg: No edema. Skin:     General: Skin is warm and dry. Findings: No bruising or lesion. Neurological:      Mental Status: He is alert and oriented to person, place, and time. Psychiatric:         Mood and Affect: Mood normal.         Behavior: Behavior normal.         Thought Content: Thought content normal.          Discharge Medications:         Medication List      START taking these medications    * amiodarone 200 MG tablet  Commonly known as: CORDARONE  Take 1 tablet by mouth 2 times daily for 7 days     * amiodarone 200 MG tablet  Commonly known as: CORDARONE  Take 1 tablet by mouth daily Take once daily starting 5/25/2022  Start taking on: May 25, 2022     dilTIAZem 120 MG extended release capsule  Commonly known as: CARDIZEM CD  Take 1 capsule by mouth daily  Start taking on: May 19, 2022     rivaroxaban 20 MG Tabs tablet  Commonly known as: Xarelto  Take 1 tablet by mouth daily (with breakfast)         * This list has 2 medication(s) that are the same as other medications prescribed for you. Read the directions carefully, and ask your doctor or other care provider to review them with you. CHANGE how you take these medications    therapeutic multivitamin-minerals tablet  What changed: Another medication with the same name was removed. Continue taking this medication, and follow the directions you see here.         CONTINUE taking these medications    allopurinol 300 MG tablet  Commonly known as: ZYLOPRIM  Take 1 tablet by mouth daily     aspirin 81 MG tablet     ferrous sulfate 325 (65 Fe) MG tablet  Commonly known as: IRON 325     KRILL OIL PO     lidocaine-prilocaine 2.5-2.5 % cream  Commonly known as: EMLA  Apply topically to port area and cover with plastic wrap one hour prior to treatment. niacin 500 MG extended release capsule     ondansetron 4 MG tablet  Commonly known as: Zofran  Take 1 tablet by mouth every 8 hours as needed for Nausea or Vomiting     PROBIOTIC-10 PO     promethazine-dextromethorphan 6.25-15 MG/5ML syrup  Commonly known as: PROMETHAZINE-DM  1-2 tsp every hs  prn cough     QC Tumeric Complex 500 MG Caps  Generic drug: turmeric     RED YEAST RICE PO     SAW PALMETTO COMPLEX PO     Vitamin D 1000 units Caps capsule  Commonly known as: CHOLECALCIFEROL     zinc gluconate 50 MG tablet        STOP taking these medications    amLODIPine 5 MG tablet  Commonly known as: NORVASC     benazepril 20 MG tablet  Commonly known as: LOTENSIN     CO Q 10 PO           Where to Get Your Medications      These medications were sent to [facility], [address] [address]    Phone: [phone]   · amiodarone 200 MG tablet  · amiodarone 200 MG tablet  · dilTIAZem 120 MG extended release capsule  · rivaroxaban 20 MG Tabs tablet         Discharge Instructions: Follow up with MICHELA Marcos CNP 1 week on 5/25/2022 as scheduled. Keep scheduled hospital follow-up appointment Dr. Dorothy Freeman on 6/2/2022 at scheduled time. Take medications as directed. Resume activity as tolerated. Diet: ADULT DIET; Regular; Low Fat/Low Chol/High Fiber/AMILCAR     Disposition: Patient is Stableand will be discharged to Home.     Time spent on discharge 36 minutes spent in assessing patient, reviewing medications, discussion with nursing, confirming safe discharge plan and preparation of discharge summary.     Signed:  Elly Robles, MICHELA, 5/18/2022 1:31 PM

## 2022-05-18 NOTE — TELEPHONE ENCOUNTER
----- Message from Jean-Claude Morales sent at 5/18/2022 10:13 AM CDT -----  Subject: Hospital Follow Up    QUESTIONS  What hospital was the Patient Discharged from? [facility]  Date of Discharge? 2022-05-18  Discharge Location? Home  Reason for hospitalization as patient stated? Atrial flutter with rvr  What question does the patient have, if applicable? Brisa Cisneros with [facility] called to set up a hospital follow up visit within 1 week. Please   reach out to Brisa Cisneros at [phone].  ---------------------------------------------------------------------------  --------------  Clau Encarnacion INFO  What is the best way for the office to contact you? OK to leave message on   voicemail  Preferred Call Back Phone Number? [phone]  ---------------------------------------------------------------------------  --------------  SCRIPT ANSWERS  Relationship to Patient? Third Party  Third Party Type? Hospital?   Representative Name?  Brisa Cisneros

## 2022-05-18 NOTE — PROGRESS NOTES
HOSPITAL MEDICINE  - PROGRESS NOTE    Admit Date: 5/16/2022 5/18/2022    Subjective:   Pt feels better this morning although has not been ambulatory    ROS  As per HPI rest of ROS neg    Objective:   Vitals: /68   Pulse 88   Temp 98 °F (36.7 °C) (Temporal)   Resp 18   Ht 5' 7\" (1.702 m)   Wt 162 lb 6.4 oz (73.7 kg)   SpO2 97%   BMI 25.44 kg/m²   General appearance: alert, appears stated age and cooperative  Skin: Skin color, texture, turgor normal.   HEENT: normocephalic  Neck: no goiter no Jvd  Lungs: clear coarse L>R  Heart: S1S2 reg 3/6 holosys murmur no S#  Abdomen: bowel sounds positive nontender nondistended  Extremities: no edema  Lymphatic: No significant lymph node enlargement papable  Neurologic: Mental status:normal    Data:   Scheduled Meds: Reviewed  Continuous Infusions:   sodium chloride 1,000 mL/hr at 05/16/22 2313    dilTIAZem 5 mg/hr (05/18/22 0432)    sodium chloride      amiodarone 0.5 mg/min (05/17/22 2036)       Intake/Output Summary (Last 24 hours) at 5/18/2022 0711  Last data filed at 5/17/2022 1958  Gross per 24 hour   Intake 210 ml   Output 1025 ml   Net -815 ml     CBC:   Recent Labs     05/18/22  0122   WBC 10.6   HGB 10.5*        BMP:  Recent Labs     05/18/22  0122      K 4.3      CO2 21*   BUN 26*   CREATININE 0.7   GLUCOSE 104     ABGs: No results found for: PHART, PO2ART, SEQ1DET  INR: No results for input(s): INR in the last 72 hours. -----------------------------------------------------------------            Assessment/Plan    Patient Active Problem List:     Murmur, cardiac     Essential hypertension     Tinnitus of both ears     Sensorineural hearing loss (SNHL) of both ears     COVID-19     Lymphoma of lymph nodes of multiple sites ([address]     Large B-cell lymphoma ([facility]     Atrial flutter with rapid ventricular response (HCC)    A/P 1)  AFl/AF - resolved on amiodarone overnight. Will plan for continued amiodarone for the next few weeks as outpt and DOAC (Rivaroxaban or Apixaban) and he will follow up with me in 2-3 weeks to consider atrial flutter ablation as this arrhythmia led to the AF and was more difficult to control from rate perspective. At that point may come off the anti-arrhythmic in favor of less toxic arrhythmia meds ie BB/CCB. He is stable for discharge from my perspective. Cont amiodarone 200 bid for one week then 200 daily and continue diltiazem  daily. Start Xarelto 20mg daily.      Madeline March MD MD  7:11 AM  5/18/2022

## 2022-05-18 NOTE — CARE COORDINATION
Determined eliquis costs as $47 copay and affordable to the pt. 30day first fill free coupon provided to the pt for use upon dc.

## 2022-05-19 ENCOUNTER — PATIENT MESSAGE (OUTPATIENT)
Dept: PRIMARY CARE CLINIC | Age: 72
End: 2022-05-19

## 2022-05-19 ENCOUNTER — CARE COORDINATION (OUTPATIENT)
Dept: CASE MANAGEMENT | Age: 72
End: 2022-05-19

## 2022-05-19 RX ORDER — CLINDAMYCIN HYDROCHLORIDE 300 MG/1
300 CAPSULE ORAL 2 TIMES DAILY
Qty: 14 CAPSULE | Refills: 0 | Status: SHIPPED | OUTPATIENT
Start: 2022-05-19 | End: 2022-05-26

## 2022-05-19 NOTE — CARE COORDINATION
[facility] Initial [facility] Up [facility]    Call within 2 business days of discharge: Yes    Patient: Indy Baig Patient : 1950   MRN: [id number]  Reason for Admission: afib with RVR  Discharge Date: 22 RARS: Readmission Risk Score: 14.4 ( )      Last Discharge [facility]       Complaint Diagnosis Description Type Department Provider    22 Tachycardia Atrial fibrillation with RVR [facility]) ED to Hosp-Admission (Discharged) (ADMITTED) [facility] TREE Oro MD; Arnav Javed . .. Spoke with: GIOVANNI    Facility: [facility]    Attempted to reach patient via phone for initial post hospital transition call. VM left stating purpose of call along with my contact information requesting a return call.    Danyell Patel [address]  Care Transitions  [phone]    Care Transitions 24 Hour Call    Care Transitions Interventions         Follow Up  Future Appointments   Date Time Provider Zuhair Scruggs   2022  8:15 AM Nga Hernandes APRN - CNP [facility]   2022  8:00 AM Dahiana Cantu MD N [facility] HEMONC [facility]   2022  9:00 AM SCHEDULE, [facility] MED ONC TREATMENTS [facility] MED ONC [place] HOD   2022  9:30 AM MD NALLELY Corrigan [facility] Cardio [facility]   6/10/2022  9:00 AM SCHEDULE, [facility] MED ONC TREATMENTS L MED ONC [place] [facility]   10/12/2022  8:00 AM Siri Fothergill Loretto Masters, APRN - CNP [facility]       Danyell Patel LPN

## 2022-05-19 NOTE — TELEPHONE ENCOUNTER
By to show me his arm and in his left forearm you can see a insertion shun from the IV catheter that is now out of his arm and the redness that surrounds it measures about 4 cm x 3 cm and is warm to touch and tender. He has some cellulitis of this forearm. I am good to go ahead and treat him with clindamycin. Have asked him to use warm compresses on the area and if not better in the morning to come in and see me again in the office. He is undergoing treatment for lymphoma right now.

## 2022-05-19 NOTE — PROGRESS NOTES
Subjective:      Patient ID: Alexa Hamilton is a 70 y.o. male. HPI   Patient reports feeling better today with less shortness of breath and fatigue. Review of Systems  As per HPI rest of ROS is negative  Objective:   Physical Exam  HENT:      Head: Normocephalic and atraumatic. Nose: Nose normal.   Eyes:      Pupils: Pupils are equal, round, and reactive to light. Neck:      Vascular: No carotid bruit. Cardiovascular:      Rate and Rhythm: Regular rhythm. Pulmonary:      Effort: Pulmonary effort is normal.      Breath sounds: Normal breath sounds. No wheezing or rales. Abdominal:      General: Abdomen is flat. There is no distension. Palpations: Abdomen is soft. Tenderness: There is no abdominal tenderness. Musculoskeletal:         General: Normal range of motion. Left lower leg: No edema. Skin:     General: Skin is warm and dry. Neurological:      General: No focal deficit present. Mental Status: He is alert. He is disoriented. Psychiatric:         Mood and Affect: Mood normal.         Assessment:    A/P  Pt today is in Afib however his rate is still not controlled. Discussed options including medical therapy vs ARIADNA guided cardioversion and he is in favor of the latter. He understands the recommendation for anticoagulation following the procedure and the risks involved including stroke. Plan:      AP proceed with cardioversion today with possible discharge thereafter and follow up for consideration of eventual medical therapy or ablation  Will plan to change amlodipine to oral diltiazem for rhythm control and BP maintenance. Would recommend anticoagulation for at least a month pending outpatient monitoring.          Allie Payne MD

## 2022-05-19 NOTE — TELEPHONE ENCOUNTER
From: Mai Dela Cruz  To: Radha Watson  Sent: 5/19/2022 10:48 AM CDT  Subject: Left arm port swelling and tenderness    I got discharged yesterday from [place] and I had ports in both arms. The right arm seems to be doing ok but the left is very tender and swollen. I questioned them about what looked like bleeding under the port retainer bandage, and they flushed it with saline and said they didn't see anything leaking.  How long does swelling and tenderness usually last?

## 2022-05-20 ENCOUNTER — HOSPITAL ENCOUNTER (OUTPATIENT)
Dept: INFUSION THERAPY | Age: 72
Discharge: HOME | End: 2022-05-20

## 2022-05-20 ENCOUNTER — CARE COORDINATION (OUTPATIENT)
Dept: CASE MANAGEMENT | Age: 72
End: 2022-05-20

## 2022-05-20 DIAGNOSIS — I48.92 ATRIAL FLUTTER WITH RAPID VENTRICULAR RESPONSE (HCC): Primary | ICD-10-CM

## 2022-05-20 PROCEDURE — 1111F DSCHRG MED/CURRENT MED MERGE: CPT | Performed by: NURSE PRACTITIONER

## 2022-05-20 NOTE — CARE COORDINATION
[facility] Transitions Initial Follow Up Call    Call within 2 business days of discharge: Yes    Patient: Sherren Martyr Patient : 1950   MRN: [id number]  Reason for Admission:   Discharge Date: 22 RARS: Readmission Risk Score: 14.4 ( )      Last Discharge [facility]       Complaint Diagnosis Description Type Department Provider    22 Tachycardia Atrial fibrillation with RVR [facility]) ED to Hosp-Admission (Discharged) (ADMITTED) [facility] TREE Mendoza MD; Fabiola Zhang . .. Spoke with: Sherren Martyr and wife Pradip, listed on [address]: [place] [address]    Non-face-to-face services provided:  Obtained and reviewed discharge summary and/or continuity of care documents Reviewed encounter information for continuity of care prior to follow up phone call - chart notes, consults, progress notes, test results, med list, appointments, AVS, other information. Advance Care Planning   Healthcare Decision Maker:    Primary Decision Maker: Libertadyue Andres Spouse - [phone]    Transitions of Care Initial Call    Was this an external facility discharge? No Discharge Facility:     Challenges to be reviewed by the provider   Additional needs identified to be addressed with provider: No  none             Method of communication with provider : none    Advance Care Planning:   Does patient have an Advance Directive: reviewed and current. Care Transition Nurse contacted the patient by telephone to perform post hospital discharge assessment. Verified name and  with patient as identifiers. Provided introduction to self, and explanation of the CTN role. CTN reviewed discharge instructions, medical action plan and red flags with patient who verbalized understanding. Patient given an opportunity to ask questions and does not have any further questions or concerns at this time. Were discharge instructions available to patient? Yes.  Reviewed appropriate site of care based on symptoms and resources available to patient including: PCP  Specialist. The patient agrees to contact the PCP office for questions related to their healthcare. Medication reconciliation was performed with patient, who verbalizes understanding of administration of home medications. Advised obtaining a 90-day supply of all daily and as-needed medications. Was patient discharged with a pulse oximeter? no    CTN provided contact information. Plan for follow-up call in 3-5 days based on severity of symptoms and risk factors. Plan for next call: IV site infection assessment, appetite, cp assessment, chemo    Care Transitions 24 Hour Call    Care Transitions Interventions         Follow Up : Spoke with patient and wife Pradip today for initial CT call after discharge from [facility], second attempt. Wife states patient left hand and arm are swollen from IV site, seen PCP yesterday started on Cleocin. Patient did review medications, 1111F order added. He is keeping site clean and elevated at bedtime, says today it does feel some better. He will have HFU on 5/25. And see Cardiology on 6/2/22. He says his appetite is good and no issue with bowels and bladder, uses occasional Miralax for constipation. He says no chest pain, does have some SOA, but says that is from where his cancer is in his lung that makes him that way. He does not wear home oxygen. He says his neighbor is assisting in keeping his yard mowed. He is to start chemo back on 5/26/22. He has not had the Covid vaccine, has PHCDM listed and has LW on file. Discussed CTN calls and follow up and he is accepting. CTN will follow up with him at a later time.    Future Appointments   Date Time Provider Zuhair Scruggs   5/25/2022  8:15 AM MIC[facility] - [facility]NP [facility]   5/26/2022  8:00 AM [facility]   5/26/2022  9:00 AM SCHEDULE, L MED ONC TREATMENTS [facility] MED ONC [place] [facility]   6/2/2022 9:30 AM MD NALLELY [facility] Cardio P-[place]   6/10/2022  9:00 AM SCHEDULE, [facility] MED ONC TREATMENTS [facility] MED ONC [place] \[facility] [place] [facility]\""   10/12/2022  8:00 AM [place] Penny Griffith, APRN - CNP [facility]       Chan Meredith RN

## 2022-05-24 ENCOUNTER — CARE COORDINATION (OUTPATIENT)
Dept: CASE MANAGEMENT | Age: 72
End: 2022-05-24

## 2022-05-24 NOTE — CARE COORDINATION
[facility]    2022    Patient: Eric Thurston  Patient : 1950   MRN: [id number]  Reason for Admission:   Discharge Date: 22 RARS: Readmission Risk Score: 14.4 ( )         Spoke with: Eric Thurston wife Pradip    Care Transitions Subsequent and Final Call    Subsequent and Final Calls  Do you have any ongoing symptoms?: No  Have your medications changed?: No  Do you have any questions related to your medications?: No  Do you currently have any active services?: No  Do you have any needs or concerns that I can assist you with?: No  Identified Barriers: None  Care Transitions Interventions  Other Interventions: Follow Up : Spoke with patient's wife Aiden today. She says his arm is better, and he is feeling better. He has follow up with CCP tomorrow and Oncology the next day. She did not give further explanation, everything is fine, so no further outreach scheduled at this time.    Future Appointments   Date Time Provider Zuhair Scruggs   2022  8:15 AM MICHELA Bone - CNP [facility]   2022  8:00 AM MD NALLELY Mak [facility] HEMONC [facility]   2022  9:00 AM SCHEDULE, [facility] MED ONC TREATMENTS [facility] MED ONC [place] HOD   2022  9:30 AM MD NALLELY Johnson [facility] Cardio [facility]   6/10/2022  9:00 AM SCHEDULE, [facility] MED ONC TREATMENTS [facility] MED ONC [place] HOD   10/12/2022  8:00 AM Melany Chakraborty APRN - CNP [facility]       Karla Perez RN

## 2022-05-24 NOTE — PROGRESS NOTES
MEDICAL ONCOLOGY PROGRESS NOTE    Pt Name: Angela Moore  MRN: [id number]  YOB: 1950  Date of evaluation: 5/26/2022  History Obtained From:  patient, electronic medical record    HISTORY OF PRESENT ILLNESS:  The patient has a diagnosis of diffuse large B-cell lymphoma, ABC phenotype. Patient is current on treatment with R-CHOP. Treatment started 4/8/2022. He was recently hospitalized with A. fib with RVR. This has improved with amiodarone. He is also on Eliquis. Denies any bleeding. Overall, his breathing has gotten significantly better. Diagnosis  · Diffuse large B-cell lymphoma, March 2022  · c-Myc, BCL6 and BCL2 rearrangement pending  · Stage IIIB    Treatment summary  · 4/8/2022- Initiated R-CHOP + GCSF every 3 weeks x 6 cycles    Hematology history  Iron Ma was first seen by me on 3/24/2022. The patient was referred by his primary care provider for findings of generalized lymphadenopathy. The patient has had symptoms of weight loss, night sweats and low-grade fever. This seems has been going on for many months now. Patient had Covid 19 infection last year. He had CT scans that showed generalized adenopathy. · 3/22/2022-CT soft tissue neck showed findings of cervical adenopathy identified, including a right level 3 node measuring 1.6 cm in greatest axial diameter (series 4-image 95). There is a left level 5 lymph node measuring 1.7 cm on axial image 102. Multiple enlarged left level 4 nodes, including a 2.3 cm node on axial image 114. Adenopathy extends down into the left supraclavicular fossa and there is bulky adenopathy also appreciated in the upper mediastinum. · 3/22/2022-CT chest with contrast showed findings of mediastinal adenopathy. Subcarinal lymph nodes are present. Right hilar lymph nodes are present. Left para-aortic adenopathy is present in the abdomen. Concern for lymphoma.   · 3/22/2002-CT abdomen/pelvis showed spleen is enlarged measuring 14 cm craniocaudal dimension. Bilateral renal cysts including dominant 5.4 cm RIGHT upper pole exophytic renal cyst. 3 mm nonobstructing LEFT lower pole renal calculus. Prostate is mildly enlarged measuring 4.8 cm transverse dimension. Bulky retroperitoneal lymphadenopathy is noted. Noted conglomerate in the LEFT periaortic region on image 34 series 4 measures 6.0 x 3.8 cm. Enlarged gastrohepatic ligament lymph nodes with reference node on image 23 measuring 14 mm short axis dimension. No enlarged pelvic or inguinal lymph nodes identified. Bilateral chronic L5 pars defects with grade 1 anterolisthesis of L5 on S1. No acute osseous finding. · 3/24/2022- (H), hemoglobin 10.1/MCV 93, platelets 157,682, WBC 8.7  · 3/24/2022-she was first seen by me. Recommended excisional biopsy left supraclavicular lymph node. · 3/24/2022  B2M 2.8, Uric Acid 3.6,   · 3/28/2022 Left Cervical Lymphadenopathy (BHP): The dominant nodes are seen in the left level 4  and left level 5 neck as well as in the left supraclavicular fossa and upper mediastinum. Degenerative spinal stenosis. Atheromatous calcification in the carotid bulbs. · 3/28/2022-excisional anibal biopsy. Final pathology pending. Preliminary results suggestive of high-grade lymphoma. Recommend allopurinol 300 mg p.o. daily. Recommend a PET scan and bone marrow biopsy. · 4/4/2022-IHC studies from excisional node biopsy consistent with diffuse large B-cell lymphoma, ABC phenotype. BCL2, BCL6 and c-Myc rearrangement in process. Recommended R-CHOP x6 cycles. Started on allopurinol. · 4/6/22 Bone marrow ([facility]): BONE MARROW: Normocellular (30-35% cellular) marrow with multilineage hematopoiesis. Negative for lymphoid infiltrates. Negative for dysplasia, no increase in blasts. Storage iron present, no ring sideroblasts. FLOW CYTOMETRY: No B-cell monoclonality and no T-cell aberrant antigenic expression. The blasts are not increa we need to sed.  CYTOGENETICS: Normal male karyotype. · 4/7/22 PET CT SKULL BASE TO MID THIGH  Extensive lymphadenopathy consistent with lymphoma. Many of these nodes are extremely metabolically active including SUV measurements of greater than 40-50 in some of the nodes. There is extensive adenopathy in the lower cervical chains including level 3, 4 and 5 nodes. Supraclavicular lymphadenopathy is present with extension into the superior mediastinum. Both anterior and middle mediastinal adenopathy as well as right hilar adenopathy is noted within the chest. A right retrocrural node, gastrohepatic ligament nodes and bulky left para-aortic nodes are also present all demonstrating intense abnormal metabolic activity. · 4/8/2022- Initiated R-CHOP + GCSF every 3 weeks x 6 cycles  · 5/12/2022 2D Echocardiogram Left ventricle normal chamber size and thickness  Preserved systolic function with estimated ejection fraction of 54%  However, GLS is slightly decreased especially the apex. This is more sensitive to predict chemotherapy induced cardiomyopathy then ejection  fraction  No regional wall motion abnormality Mitral annulus calcification with moderate degree of mitral leaflet  thickening and normal mobility  Bicuspid aortic valve with gradient of 10 mm. Judging from stroke-volume  index which is 27.7, which is reduced. This may represent low gradient, normal ejection fraction, low stroke-volume significant aortic stenosis. If patient is symptomatic, please refer to cardiology for ARIADNA   · 5/16/2022 Echo ARIADNA LVEF 55%. Mildly thickened aortic valve leaflets with preserved leaflet mobility. Moderate TR.     Past Medical History:    Past Medical History:   Diagnosis Date    Cancer [facility])     lymphoma    Chronic neck pain     Herniated disc, cervical     HTN (hypertension)        Past Surgical History:    Past Surgical History:   Procedure Laterality Date    LYMPH NODE BIOPSY      left side of neck    PORT SURGERY N/A 4/8/2022    SINGLE LUMEN PORT PLACEMENT WITH US & FLUORO performed by Zayda Magaña MD at [place] [id number] History:    Marital status:  Smoking status:Former smoker, Quit more 25 years ago  ETOH status:No   Resides:[place]    Family History:   Family History   Problem Relation Age of Onset    Cancer Mother         lymphoma    Heart Disease Father        Current Hospital Medications:    Current Outpatient Medications   Medication Sig Dispense Refill    predniSONE (DELTASONE) 50 MG tablet TAKE 2 TABLETS BY MOUTH ONCE DAILY FOR 5 DAYS      clindamycin (CLEOCIN) 300 MG capsule Take 1 capsule by mouth 2 times daily for 7 days 14 capsule 0    amiodarone (CORDARONE) 200 MG tablet Take 1 tablet by mouth 2 times daily for 7 days 14 tablet 0    rivaroxaban (XARELTO) 20 MG TABS tablet Take 1 tablet by mouth daily (with breakfast) 30 tablet 0    dilTIAZem (CARDIZEM CD) 120 MG extended release capsule Take 1 capsule by mouth daily 30 capsule 3    lidocaine-prilocaine (EMLA) 2.5-2.5 % cream Apply topically to port area and cover with plastic wrap one hour prior to treatment.  1 each 1    ondansetron (ZOFRAN) 4 MG tablet Take 1 tablet by mouth every 8 hours as needed for Nausea or Vomiting 30 tablet 1    allopurinol (ZYLOPRIM) 300 MG tablet Take 1 tablet by mouth daily 30 tablet 5    promethazine-dextromethorphan (PROMETHAZINE-DM) 6.25-15 MG/5ML syrup 1-2 tsp every hs  prn cough (Patient not taking: Reported on 5/20/2022) 120 mL 0    turmeric (QC TUMERIC COMPLEX) 500 MG CAPS Take by mouth in the morning, at noon, and at bedtime       zinc gluconate 50 MG tablet Take 50 mg by mouth daily      ferrous sulfate (IRON 325) 325 (65 Fe) MG tablet Take 325 mg by mouth daily (with breakfast)      KRILL OIL PO Take by mouth      Red Yeast Rice Extract (RED YEAST RICE PO) Take by mouth      Probiotic Product (PROBIOTIC-10 PO) Take by mouth      Zn-Pyg Afri-Nettle-Saw Palmet (SAW PALMETTO COMPLEX PO) Take by mouth      Multiple Vitamins-Minerals (THERAPEUTIC MULTIVITAMIN-MINERALS) tablet Take 1 tablet by mouth daily      Vitamin D (CHOLECALCIFEROL) 1000 UNITS CAPS capsule Take 1,000 Units by mouth daily       niacin 500 MG CR capsule Take 500 mg by mouth nightly       aspirin 81 MG tablet Take 81 mg by mouth daily       No current facility-administered medications for this visit.      Facility-Administered Medications Ordered in Other Visits   Medication Dose Route Frequency Provider Last Rate Last Admin    0.9 % sodium chloride infusion  20 mL/hr IntraVENous Once Attila Gregory MD 20 mL/hr at 05/26/22 0933 20 mL/hr at 05/26/22 0933    pegfilgrastim (NEULASTA) on-body injector 6 mg  6 mg SubCUTAneous Once Attila Gregory MD        cyclophosphamide (CYTOXAN) 1,380 mg in sodium chloride 0.9 % 250 mL chemo IVPB  750 mg/m2 (Treatment Plan Recorded) IntraVENous Once Attila Gregory MD        vinCRIStine (ONCOVIN) 2 mg in sodium chloride 0.9 % 50 mL chemo IVPB  2 mg IntraVENous Once Attila Gregory MD        sodium chloride flush 0.9 % injection 5-40 mL  5-40 mL IntraVENous PRN Attila Gregory MD        heparin flush 100 UNIT/ML injection 500 Units  500 Units IntraCATHeter PRN Attila Gregory MD        DOXOrubicin HCl (ADRIAMYCIN) 92 mg in sodium chloride 0.9 % 100 mL chemo IVPB  50 mg/m2 (Treatment Plan Recorded) IntraVENous Once Attila Gregory MD           Allergies: No Known Allergies      Subjective   REVIEW OF SYSTEMS:   CONSTITUTIONAL: no fever, no night sweats, fatigue;  HEENT: no blurring of vision, no double vision, no hearing difficulty, no tinnitus, no ulceration, no dysplasia, no epistaxis;   LUNGS: no cough, no hemoptysis, no wheeze,  no shortness of breath;  CARDIOVASCULAR: no palpitation, no chest pain, no shortness of breath;  GI: no abdominal pain, no nausea, no vomiting, no diarrhea, no constipation;  KARINA: no dysuria, no hematuria, no frequency or urgency, no nephrolithiasis;  MUSCULOSKELETAL: no joint pain, no swelling, no stiffness;  ENDOCRINE: no polyuria, no polydipsia, no cold or heat intolerance;  HEMATOLOGY: no easy bruising or bleeding, no history of clotting disorder;  DERMATOLOGY: no skin rash, no eczema, no pruritus;  PSYCHIATRY: no depression, no anxiety, no panic attacks, no suicidal ideation, no homicidal ideation;  NEUROLOGY: no syncope, no seizures, no numbness or tingling of hands, no numbness or tingling of feet, no paresis;     Objective   BP (!) 158/70   Pulse 83   Temp 97.9 °F (36.6 °C) (Oral)   Ht 5' 7\" (1.702 m)   Wt 156 lb 8 oz (71 kg)   SpO2 98%   BMI 24.51 kg/m²     PHYSICAL EXAM:  CONSTITUTIONAL: Alert, appropriate, no acute distress  EYES: Non icteric, EOM intact, pupils equal round   ENT: Mucus membranes moist, no oral pharyngeal lesions, external inspection of ears and nose are normal.  NECK: Supple, no masses. No palpable thyroid mass  CHEST/LUNGS: CTA bilaterally, normal respiratory effort   CARDIOVASCULAR: RRR, no murmurs. No lower extremity edema  ABDOMEN: soft non-tender, active bowel sounds, no HSM. No palpable masses  EXTREMITIES: warm, full ROM in all 4 extremities, no focal weakness.   SKIN: warm, dry with no rashes or lesions  LYMPH: No cervical, clavicular, axillary, or inguinal lymphadenopathy  NEUROLOGIC: follows commands, non focal     LABORATORY RESULTS REVIEWED/ANALYZED BY ME:  Lab Results   Component Value Date    WBC 6.99 05/26/2022    HGB 11.6 (L) 05/26/2022    HCT 35.9 (L) 05/26/2022    MCV 94.2 (H) 05/26/2022     (H) 05/26/2022     Lab Results   Component Value Date    NEUTROABS 5.14 05/26/2022     Lab Results   Component Value Date     05/26/2022    K 4.1 05/26/2022     05/26/2022    CO2 26 05/26/2022    BUN 15 05/26/2022    CREATININE 0.8 05/26/2022    GLUCOSE 115 (H) 05/26/2022    CALCIUM 9.2 05/26/2022    PROT 7.0 05/26/2022    LABALBU 3.9 05/26/2022    BILITOT 0.3 05/26/2022    ALKPHOS 75 05/26/2022    AST 65 (H) 05/26/2022 ALT 19 (L) 05/26/2022    LABGLOM >60 05/26/2022    GFRAA >59 05/18/2022    AGRATIO 1.5 09/19/2016    GLOB 3.1 05/26/2022         RADIOLOGY STUDIES REPORT/REVIEWED AND INTERPRETED BY ME:  5/12/2022 2D Echocardiogram Left ventricle normal chamber size and thickness  Preserved systolic function with estimated ejection fraction of 54%   However, GLS is slightly decreased especially the apex. This is more   sensitive to predict chemotherapy induced cardiomyopathy then ejection   fraction  No regional wall motion abnormality Mitral annulus calcification with moderate degree of mitral leaflet  thickening and normal mobility  Bicuspid aortic valve with gradient of 10 mm. Judging from stroke-volume  index which is 27.7, which is reduced. This may represent low gradient, normal ejection fraction, low stroke-volume significant aortic stenosis. If patient is symptomatic, please refer to cardiology for ARIADNA    5/16/2022 Echo ARIADNA Mitral valve leaflets are mildly thickened with preserved leaflet  mobility. Mild mitral regurgitation is present. Mildly thickened aortic valve leaflets with preserved leaflet mobility. Moderate TR  No evidence of thrombus or spontaneous contrast noted within the JAZMINE. Normal intact intra-atrial septum was noted with no evidence of  significant intra-atrial communications by color flow Doppler or by  agitated saline study. Normal left ventricular size with preserved LV function and an estimated  ejection fraction of approximately 55%. No evidence of left ventricular mass or thrombus noted. Evidence of mild atherosclerosis noted involving the the descending aorta. Negative bubble study      ASSESSMENT:  #Diffuse large B-cell lymphoma, stage IIIB, ABC phenotype (c-Myc(-), BCL-2 (+), BCL6(-)rearrangement)  -3/28/2022-excisional node biopsy by Dr. Reina Arriola, [facility].  -Path consistent with ABC phenotype DLBCL: Lymphoma as per Dr. Sita Tolbert.    -PET scan and bone marrow biopsy-ordered today on 4/4/2022.  -Persistent B symptoms.  - PET scan/bone marrow biopsy on 4/4/2022. (Negative for lymphoma involvement)  Extensive lymphadenopathy consistent with lymphoma. Many of these nodes are extremely metabolically active including SUV measurements of greater than 40-50 in some of the nodes. There is extensive adenopathy in the lower cervical chains including level 3, 4 and 5 nodes. Supraclavicular lymphadenopathy is present with extension into the superior mediastinum. Both anterior and middle mediastinal adenopathy as well as right hilar adenopathy is noted within the chest. A right retrocrural node, gastrohepatic ligament nodes and bulky left para-aortic nodes are also present all demonstrating intense abnormal metabolic activity. .      Recommended:  -R-CHOP x6 cycles  G-CSF support    Proceed cycle #3 R-CHOP    Pulmonary interstitial fibrosis-I reviewed the CT scan with the patient. He has complaints of short of breath on exertion. He has findings of interstitial fibrosis. I offered a pulmonology consultation but he wants to defer this at this time. PLAN:  · RTC with MD in treatment room 3 weeks   · C#3/6 R-CHOP + GCSF today and every 3 weeks  · Repeat CT scans prior to next visit  · Continue Allopurinol 300mg daily  · Consider Pulmonology referral in the future         IMaykel, eric pre charting  as Medical Assistant for Christiano Joseph MD. Electronically signed by Maykel Taylor MA on 5/26/2022 at 3:38 PM CDT. Leata Hashimoto, am scribing for Christiano Joseph MD. Electronically signed by Mike Sparrow RN on 5/26/2022 at 9:09 AM CDT. I, Dr Pavan Ivey, personally performed the services described in this documentation as scribed by Mike Sparrow RN in my presence and is both accurate and complete. I have seen, examined and reviewed this patient medication list, appropriate labs and imaging studies. I reviewed relevant medical records and others physicians notes.  I discussed the plans of care with the patient. I answered all the questions to the patients satisfaction. I have also reviewed the chief complaint (CC) and part of the history (History of Present Illness (HPI), Past Family Social History [facility]), or Review of Systems (ROS) and made changes when appropriated. (Please note that portions of this note were completed with a voice recognition program. Efforts were made to edit the dictations but occasionally words are mis-transcribed. )Electronically signed by Portillo Emerson MD on 5/26/2022 at 10:45 AM

## 2022-05-25 ENCOUNTER — OFFICE VISIT (OUTPATIENT)
Dept: PRIMARY CARE CLINIC | Age: 72
End: 2022-05-25
Payer: MEDICARE

## 2022-05-25 VITALS
BODY MASS INDEX: 24.67 KG/M2 | DIASTOLIC BLOOD PRESSURE: 70 MMHG | OXYGEN SATURATION: 99 % | WEIGHT: 157.2 LBS | RESPIRATION RATE: 16 BRPM | SYSTOLIC BLOOD PRESSURE: 118 MMHG | HEART RATE: 82 BPM | TEMPERATURE: 98.2 F | HEIGHT: 67 IN

## 2022-05-25 DIAGNOSIS — Z09 HOSPITAL DISCHARGE FOLLOW-UP: ICD-10-CM

## 2022-05-25 DIAGNOSIS — C85.98 LYMPHOMA OF LYMPH NODES OF MULTIPLE REGIONS, UNSPECIFIED LYMPHOMA TYPE (HCC): Primary | ICD-10-CM

## 2022-05-25 DIAGNOSIS — I10 ESSENTIAL HYPERTENSION: ICD-10-CM

## 2022-05-25 DIAGNOSIS — R63.4 WEIGHT LOSS: ICD-10-CM

## 2022-05-25 DIAGNOSIS — I48.91 ATRIAL FIBRILLATION, UNSPECIFIED TYPE (HCC): ICD-10-CM

## 2022-05-25 PROCEDURE — 99495 TRANSJ CARE MGMT MOD F2F 14D: CPT | Performed by: NURSE PRACTITIONER

## 2022-05-25 PROCEDURE — 1111F DSCHRG MED/CURRENT MED MERGE: CPT | Performed by: NURSE PRACTITIONER

## 2022-05-25 RX ORDER — PREDNISONE 50 MG/1
TABLET ORAL
COMMUNITY
Start: 2022-05-16 | End: 2022-08-25

## 2022-05-25 ASSESSMENT — PATIENT HEALTH QUESTIONNAIRE - PHQ9
SUM OF ALL RESPONSES TO PHQ9 QUESTIONS 1 & 2: 0
SUM OF ALL RESPONSES TO PHQ QUESTIONS 1-9: 0
1. LITTLE INTEREST OR PLEASURE IN DOING THINGS: 0
2. FEELING DOWN, DEPRESSED OR HOPELESS: 0
SUM OF ALL RESPONSES TO PHQ QUESTIONS 1-9: 0

## 2022-05-25 NOTE — PROGRESS NOTES
vitals remained stable. He is medically and clinically cleared for discharge. He has been advised to keep his scheduled follow-up appointments with cardiology and a hospital follow-up appointment with his PCP. He is additionally to keep his scheduled appointments with oncology and oncology treatments.   He will be discharged home in stable condition.     Past Medical History:   Diagnosis Date    Cancer [facility])     lymphoma    Chronic neck pain     Herniated disc, cervical     HTN (hypertension)       Past Surgical History:   Procedure Laterality Date    LYMPH NODE BIOPSY      left side of neck    PORT SURGERY N/A 2022    SINGLE LUMEN PORT PLACEMENT WITH US & FLUORO performed by Aman Vides MD at [address] [facility] 2022   SYSTOLIC 134 184 - [phone]   DIASTOLIC 70 70 - 68 64 64   Pulse 82 82 90 88 75 78   Temp 98.2 97 - 98 - -   Resp 16 18 - 18 - -   SpO2 99 94 - 97 98 97   Weight 157 lb 3.2 oz - - 162 lb 6.4 oz - -   Height 5' 7\" - - - - -   Body mass index 24.62 kg/m2 - - - - -   Pain Level - - - - - -   Some recent data might be hidden       Family History   Problem Relation Age of Onset    Cancer Mother         lymphoma    Heart Disease Father        Social History     Tobacco Use    Smoking status: Former Smoker     Packs/day: 2.00     Years: 15.00     Pack years: 30.00     Types: Cigarettes     Quit date:      Years since quittin.4    Smokeless tobacco: Never Used   Substance Use Topics    Alcohol use: Yes     Comment: Occasional       Current Outpatient Medications on File Prior to Visit   Medication Sig Dispense Refill    predniSONE (DELTASONE) 50 MG tablet TAKE 2 TABLETS BY MOUTH ONCE DAILY FOR 5 DAYS      clindamycin (CLEOCIN) 300 MG capsule Take 1 capsule by mouth 2 times daily for 7 days 14 capsule 0    amiodarone (CORDARONE) 200 MG tablet Take 1 tablet by mouth 2 times daily for 7 days 14 tablet 0    rivaroxaban (XARELTO) 20 MG TABS tablet Take 1 tablet by mouth daily (with breakfast) 30 tablet 0    dilTIAZem (CARDIZEM CD) 120 MG extended release capsule Take 1 capsule by mouth daily 30 capsule 3    lidocaine-prilocaine (EMLA) 2.5-2.5 % cream Apply topically to port area and cover with plastic wrap one hour prior to treatment. 1 each 1    ondansetron (ZOFRAN) 4 MG tablet Take 1 tablet by mouth every 8 hours as needed for Nausea or Vomiting 30 tablet 1    allopurinol (ZYLOPRIM) 300 MG tablet Take 1 tablet by mouth daily 30 tablet 5    turmeric (QC TUMERIC COMPLEX) 500 MG CAPS Take by mouth in the morning, at noon, and at bedtime       zinc gluconate 50 MG tablet Take 50 mg by mouth daily      ferrous sulfate (IRON 325) 325 (65 Fe) MG tablet Take 325 mg by mouth daily (with breakfast)      KRILL OIL PO Take by mouth      Red Yeast Rice Extract (RED YEAST RICE PO) Take by mouth      Probiotic Product (PROBIOTIC-10 PO) Take by mouth      Zn-Pyg Afri-Nettle-Saw Palmet (SAW PALMETTO COMPLEX PO) Take by mouth      Multiple Vitamins-Minerals (THERAPEUTIC MULTIVITAMIN-MINERALS) tablet Take 1 tablet by mouth daily      Vitamin D (CHOLECALCIFEROL) 1000 UNITS CAPS capsule Take 1,000 Units by mouth daily       niacin 500 MG CR capsule Take 500 mg by mouth nightly       aspirin 81 MG tablet Take 81 mg by mouth daily      promethazine-dextromethorphan (PROMETHAZINE-DM) 6.25-15 MG/5ML syrup 1-2 tsp every hs  prn cough (Patient not taking: Reported on 5/20/2022) 120 mL 0     No current facility-administered medications on file prior to visit.      No Known Allergies    Health Maintenance   Topic Date Due    Pneumococcal 65+ years Vaccine (1 - PCV) Never done    COVID-19 Vaccine (1) Never done    Hepatitis C screen  Never done    Shingles vaccine (1 of 2) Never done    DTaP/Tdap/Td vaccine (1 - Tdap) 11/10/2022 (Originally 9/1/1969)    Flu vaccine (Season Ended) 03/10/2023 (Originally 9/1/2022)   [employer] Annual Wellness Visit (AWV)  10/09/2022    Depression Screen  12/26/2022    Colorectal Cancer Screen  03/17/2026    Lipids  10/08/2026    AAA screen  Completed    Hepatitis A vaccine  Aged Out    Hepatitis B vaccine  Aged Out    Hib vaccine  Aged Out    Meningococcal (ACWY) vaccine  Aged Out       Subjective:      Review of Systems   Constitutional: Positive for activity change, fatigue and unexpected weight change. Cardiovascular: Positive for palpitations. Negative for chest pain and leg swelling. Psychiatric/Behavioral: Negative. Objective:     Physical Exam  Vitals and nursing note reviewed. Constitutional:       Appearance: He is well-developed. He is ill-appearing. HENT:      Head: Normocephalic. Cardiovascular:      Rate and Rhythm: Normal rate and regular rhythm. Heart sounds: Normal heart sounds. Pulmonary:      Effort: Pulmonary effort is normal.      Breath sounds: Normal breath sounds. Skin:     General: Skin is warm and dry. Capillary Refill: Capillary refill takes less than 2 seconds. Coloration: Skin is pale. Neurological:      General: No focal deficit present. Mental Status: He is alert and oriented to person, place, and time. Psychiatric:         Mood and Affect: Mood normal.         Behavior: Behavior normal.         Thought Content: Thought content normal.         Judgment: Judgment normal.       /70   Pulse 82   Temp 98.2 °F (36.8 °C) (Temporal)   Resp 16   Ht 5' 7\" (1.702 m)   Wt 157 lb 3.2 oz (71.3 kg)   SpO2 99%   BMI 24.62 kg/m²     Assessment:       Diagnosis Orders   1. Lymphoma of lymph nodes of multiple regions, unspecified lymphoma type ([address]     2. Weight loss     3. Essential hypertension     4. Atrial fibrillation, unspecified type ([address]           Plan:   More than 50% of the time was spent counseling and coordinating care for a total time of 30min face to face.     PDMP Monitoring:    Last PDMP Óscar Sanchez as Reviewed [facility]):  Review User Review Instant Review Result            Urine Drug Screenings (1 yr)    No resulted procedures found. Medication Contract and Consent for Opioid Use Documents Filed      No documents found                 Patient given educational materials -see patient instructions. Discussed use, benefit, and side effects of prescribed medications. All patient questions answered. Pt voiced understanding. Reviewed health maintenance. Instructed to continue currentmedications, diet and exercise. Patient agreed with treatment plan. Follow up as directed. MEDICATIONS:  No orders of the defined types were placed in this encounter. ORDERS:  No orders of the defined types were placed in this encounter. Follow-up:  Return in about 3 months (around 8/25/2022) for f/u. PATIENT INSTRUCTIONS:  There are no Patient Instructions on file for this visit. Electronically signed by MICHELA Brewer CNP on 5/25/2022 at 9:52 AM    EMR Dragon/transcription disclaimer:  Much of thisencounter note is electronic transcription/translation of spoken language to printed texts. The electronic translation of spoken language may be erroneous, or at times, nonsensical words or phrases may be inadvertentlytranscribed. Although I have reviewed the note for such errors, some may still exist.   Post-Discharge Transitional Care  Follow Up      Loisjustin Walters   YOB: 1950    Date of Office Visit:  5/25/2022  Date of Hospital Admission: 5/16/22  Date of Hospital Discharge: 5/18/22  Risk of hospital readmission (high >=14%. Medium >=10%) :Readmission Risk Score: 14.4 ( )      Care management risk score Rising risk (score 2-5) and Complex Care (Scores >=6): 0     Non face to face  following discharge, date last encounter closed (first attempt may have been earlier): 5/20/2022  1:07 PM    Call initiated 2 business days of discharge:  Yes    ASSESSMENT/PLAN:   Lymphoma of lymph nodes of multiple regions, unspecified lymphoma type ([address]  Weight loss  Essential hypertension  Atrial fibrillation, unspecified type [facility])      Medical Decision Making: moderate complexity  Return in about 3 months (around 8/25/2022) for f/u. On this date 5/25/2022 I have spent 30 minutes reviewing previous notes, test results and face to face with the patient discussing the diagnosis and importance of compliance with the treatment plan as well as documenting on the day of the visit. Subjective:   HPI:  Follow up of Hospital problems/diagnosis(es): lympoma, afib, cellulitis left arm. He did stop by last week and let me look at his arm and he had a definite infection in the left arm with cellulitis where he had an IV during his hospital stay. They did attempt fibrillation to get him out of A. fib but it was unsuccessful but started him on amiodarone in Xarelto and now he is back in rhythm. He is following up with cardiology. He also starts chemo tomorrow with Dr. Elio Figueroa. He has a port in his right chest.  He said actually he is feeling better than he has in a long time. Inpatient course: Discharge summary reviewed- see chart. Hospital Course: The patient is a 74 y. o. male who presented to [facility] ER on 5/16/2022 with PMH B-cell lymphoma, HTN, complaining of tachycardia. He denies a previous history of irregular HR.  He states that he has not felt well over the last few days. He took his BP on his home monitor the am of his admit and the monitor told him that his HR was 160. Denied any recent illnesses, chest pain, palpitations, fevers or chills.  He became concerned and came to the ED for further evaluation. He did C/O dizziness with position change before coming to the ED. Further ED work-up revealedWBC 13.8, CMP WNL, troponin negative,  and UA was negative. EKG showed a-flutter 170's with RVR. Patient was started on a cardizem gtt and was maxed out at 15mg/hr.  Cardiology was consulted and Amiodarone was initiated.      For ARIADNA and cardioversion on 5/17/2022, which was unsuccessful. EP was consulted with the plan to control rate with oral rate control meds and anticoagulants. The patient did, however, convert to sinus rhythm overnight. Recommend amiodarone 200 mg twice daily x7 days, and 200 mg daily of amiodarone. Additionally recommend 120mg extended release Cardizem and daily Xarelto 20 mg. He has been cleared by cardiology to go home. He has additionally been cleared by oncology to return home. New medications have been discussed with patient. He verbalizes understanding his labs and vitals remained stable. He is medically and clinically cleared for discharge. He has been advised to keep his scheduled follow-up appointments with cardiology and a hospital follow-up appointment with his PCP. He is additionally to keep his scheduled appointments with oncology and oncology treatments. He will be discharged home in stable condition. Interval history/Current status: stable    Patient Active Problem List   Diagnosis    Murmur, cardiac    Essential hypertension    Tinnitus of both ears    Sensorineural hearing loss (SNHL) of both ears    COVID-19    Lymphoma of lymph nodes of multiple sites ([address]    Large B-cell lymphoma ([address]    Atrial flutter with rapid ventricular response ([address]       Medications listed as ordered at the time of discharge from hospital     Medication List          Accurate as of May 25, 2022  9:52 AM. If you have any questions, ask your nurse or doctor.             CONTINUE taking these medications    allopurinol 300 MG tablet  Commonly known as: ZYLOPRIM  Take 1 tablet by mouth daily     amiodarone 200 MG tablet  Commonly known as: CORDARONE  Take 1 tablet by mouth 2 times daily for 7 days     aspirin 81 MG tablet     clindamycin 300 MG capsule  Commonly known as: CLEOCIN  Take 1 capsule by mouth 2 times daily for 7 days     dilTIAZem 120 MG extended release capsule  Commonly known as: CARDIZEM CD  Take 1 capsule by mouth daily     ferrous sulfate 325 (65 Fe) MG tablet  Commonly known as: IRON 325     KRILL OIL PO     lidocaine-prilocaine 2.5-2.5 % cream  Commonly known as: EMLA  Apply topically to port area and cover with plastic wrap one hour prior to treatment. niacin 500 MG extended release capsule     ondansetron 4 MG tablet  Commonly known as: Zofran  Take 1 tablet by mouth every 8 hours as needed for Nausea or Vomiting     predniSONE 50 MG tablet  Commonly known as: DELTASONE     PROBIOTIC-10 PO     promethazine-dextromethorphan 6.25-15 MG/5ML syrup  Commonly known as: PROMETHAZINE-DM  1-2 tsp every hs  prn cough     QC Tumeric Complex 500 MG Caps  Generic drug: turmeric     RED YEAST RICE PO     rivaroxaban 20 MG Tabs tablet  Commonly known as: Xarelto  Take 1 tablet by mouth daily (with breakfast)     SAW PALMETTO COMPLEX PO     therapeutic multivitamin-minerals tablet     Vitamin D 1000 units Caps capsule  Commonly known as: CHOLECALCIFEROL     zinc gluconate 50 MG tablet              Medications marked \"taking\" at this time  Outpatient Medications Marked as Taking for the 5/25/22 encounter (Office Visit) with MICHELA Barton - CNP   Medication Sig Dispense Refill    predniSONE (DELTASONE) 50 MG tablet TAKE 2 TABLETS BY MOUTH ONCE DAILY FOR 5 DAYS      clindamycin (CLEOCIN) 300 MG capsule Take 1 capsule by mouth 2 times daily for 7 days 14 capsule 0    amiodarone (CORDARONE) 200 MG tablet Take 1 tablet by mouth 2 times daily for 7 days 14 tablet 0    rivaroxaban (XARELTO) 20 MG TABS tablet Take 1 tablet by mouth daily (with breakfast) 30 tablet 0    dilTIAZem (CARDIZEM CD) 120 MG extended release capsule Take 1 capsule by mouth daily 30 capsule 3    lidocaine-prilocaine (EMLA) 2.5-2.5 % cream Apply topically to port area and cover with plastic wrap one hour prior to treatment.  1 each 1    ondansetron (ZOFRAN) 4 MG tablet Take 1 tablet by mouth every 8 hours as needed for Nausea or Vomiting 30 tablet 1    allopurinol (ZYLOPRIM) 300 MG tablet Take 1 tablet by mouth daily 30 tablet 5    turmeric (QC TUMERIC COMPLEX) 500 MG CAPS Take by mouth in the morning, at noon, and at bedtime       zinc gluconate 50 MG tablet Take 50 mg by mouth daily      ferrous sulfate (IRON 325) 325 (65 Fe) MG tablet Take 325 mg by mouth daily (with breakfast)      KRILL OIL PO Take by mouth      Red Yeast Rice Extract (RED YEAST RICE PO) Take by mouth      Probiotic Product (PROBIOTIC-10 PO) Take by mouth      Zn-Pyg Afri-Nettle-Saw Palmet (SAW PALMETTO COMPLEX PO) Take by mouth      Multiple Vitamins-Minerals (THERAPEUTIC MULTIVITAMIN-MINERALS) tablet Take 1 tablet by mouth daily      Vitamin D (CHOLECALCIFEROL) 1000 UNITS CAPS capsule Take 1,000 Units by mouth daily       niacin 500 MG CR capsule Take 500 mg by mouth nightly       aspirin 81 MG tablet Take 81 mg by mouth daily          Medications patient taking as of now reconciled against medications ordered at time of hospital discharge: Yes    A comprehensive review of systems was negative except for what was noted in the HPI. Objective:    /70   Pulse 82   Temp 98.2 °F (36.8 °C) (Temporal)   Resp 16   Ht 5' 7\" (1.702 m)   Wt 157 lb 3.2 oz (71.3 kg)   SpO2 99%   BMI 24.62 kg/m²   See above PE. Part of the note before I realized he was a hospital follow-up. I reviewed his lab work. He does have slight anemia but he is due for a visit with oncology tomorrow and I will recheck it. His arm did look much better today he will finish the clindamycin. An electronic signature was used to authenticate this note.   --[facility], APRN - CNP

## 2022-05-26 ENCOUNTER — HOSPITAL ENCOUNTER (OUTPATIENT)
Dept: INFUSION THERAPY | Age: 72
Discharge: HOME | End: 2022-05-26
Payer: MEDICARE

## 2022-05-26 ENCOUNTER — OFFICE VISIT (OUTPATIENT)
Dept: HEMATOLOGY | Age: 72
End: 2022-05-26
Payer: MEDICARE

## 2022-05-26 VITALS
HEART RATE: 83 BPM | OXYGEN SATURATION: 98 % | TEMPERATURE: 97.9 F | HEIGHT: 67 IN | BODY MASS INDEX: 24.56 KG/M2 | WEIGHT: 156.5 LBS | DIASTOLIC BLOOD PRESSURE: 70 MMHG | SYSTOLIC BLOOD PRESSURE: 158 MMHG

## 2022-05-26 DIAGNOSIS — C83.30 DIFFUSE LARGE B-CELL LYMPHOMA, UNSPECIFIED BODY REGION (HCC): Primary | ICD-10-CM

## 2022-05-26 DIAGNOSIS — C85.98 LYMPHOMA OF LYMPH NODES OF MULTIPLE SITES (HCC): ICD-10-CM

## 2022-05-26 DIAGNOSIS — Z01.818 EXAMINATION PRIOR TO CHEMOTHERAPY: ICD-10-CM

## 2022-05-26 DIAGNOSIS — C85.10 LARGE B-CELL LYMPHOMA (HCC): ICD-10-CM

## 2022-05-26 DIAGNOSIS — Z51.11 CHEMOTHERAPY MANAGEMENT, ENCOUNTER FOR: ICD-10-CM

## 2022-05-26 DIAGNOSIS — D64.81 ANTINEOPLASTIC CHEMOTHERAPY INDUCED ANEMIA: ICD-10-CM

## 2022-05-26 DIAGNOSIS — T45.1X5D ADVERSE EFFECT OF CHEMOTHERAPY, SUBSEQUENT ENCOUNTER: ICD-10-CM

## 2022-05-26 DIAGNOSIS — C85.98 LYMPHOMA OF LYMPH NODES OF MULTIPLE SITES (HCC): Primary | ICD-10-CM

## 2022-05-26 DIAGNOSIS — T45.1X5A ANTINEOPLASTIC CHEMOTHERAPY INDUCED ANEMIA: ICD-10-CM

## 2022-05-26 DIAGNOSIS — Z51.12 ENCOUNTER FOR ANTINEOPLASTIC IMMUNOTHERAPY: ICD-10-CM

## 2022-05-26 DIAGNOSIS — Z71.89 CARE PLAN DISCUSSED WITH PATIENT: ICD-10-CM

## 2022-05-26 DIAGNOSIS — I48.91 ATRIAL FIBRILLATION WITH RVR (HCC): ICD-10-CM

## 2022-05-26 LAB
ALBUMIN SERPL-MCNC: 3.9 G/DL (ref 3.5–5.2)
ALP BLD-CCNC: 75 U/L (ref 40–130)
ALT SERPL-CCNC: 19 U/L (ref 21–72)
ANION GAP SERPL CALCULATED.3IONS-SCNC: 12 MMOL/L (ref 7–19)
AST SERPL-CCNC: 65 U/L (ref 17–59)
BILIRUB SERPL-MCNC: 0.3 MG/DL (ref 0.2–1.3)
BUN BLDV-MCNC: 15 MG/DL (ref 9–20)
CALCIUM SERPL-MCNC: 9.2 MG/DL (ref 8.4–10.2)
CHLORIDE BLD-SCNC: 102 MMOL/L (ref 98–111)
CO2: 26 MMOL/L (ref 22–29)
CREAT SERPL-MCNC: 0.8 MG/DL (ref 0.6–1.2)
GFR NON-AFRICAN AMERICAN: >60
GLOBULIN: 3.1 G/DL
GLUCOSE BLD-MCNC: 115 MG/DL (ref 74–106)
HCT VFR BLD CALC: 35.9 % (ref 40.1–51)
HEMOGLOBIN: 11.6 G/DL (ref 13.7–17.5)
LYMPHOCYTES ABSOLUTE: 0.75 K/UL (ref 1.18–3.74)
LYMPHOCYTES RELATIVE PERCENT: 10.7 % (ref 19.3–53.1)
MCH RBC QN AUTO: 30.4 PG (ref 25.7–32.2)
MCHC RBC AUTO-ENTMCNC: 32.3 G/DL (ref 32.3–36.5)
MCV RBC AUTO: 94.2 FL (ref 79–92.2)
MONOCYTES ABSOLUTE: 0.55 K/UL (ref 0.24–0.82)
MONOCYTES RELATIVE PERCENT: 7.9 % (ref 4.7–12.5)
NEUTROPHILS ABSOLUTE: 5.14 K/UL (ref 1.56–6.13)
NEUTROPHILS RELATIVE PERCENT: 73.5 % (ref 34–71.1)
PDW BLD-RTO: 18.6 % (ref 11.6–14.4)
PLATELET # BLD: 382 K/UL (ref 163–337)
PMV BLD AUTO: 9.1 FL (ref 7.4–10.4)
POTASSIUM SERPL-SCNC: 4.1 MMOL/L (ref 3.5–5.1)
RBC # BLD: 3.81 M/UL (ref 4.63–6.08)
SODIUM BLD-SCNC: 140 MMOL/L (ref 137–145)
TOTAL PROTEIN: 7 G/DL (ref 6.3–8.2)
WBC # BLD: 6.99 K/UL (ref 4.23–9.07)

## 2022-05-26 PROCEDURE — 85025 COMPLETE CBC W/AUTO DIFF WBC: CPT

## 2022-05-26 PROCEDURE — 96415 CHEMO IV INFUSION ADDL HR: CPT

## 2022-05-26 PROCEDURE — 1123F ACP DISCUSS/DSCN MKR DOCD: CPT | Performed by: INTERNAL MEDICINE

## 2022-05-26 PROCEDURE — 2580000003 HC RX 258: Performed by: INTERNAL MEDICINE

## 2022-05-26 PROCEDURE — 96413 CHEMO IV INFUSION 1 HR: CPT

## 2022-05-26 PROCEDURE — 99214 OFFICE O/P EST MOD 30 MIN: CPT | Performed by: INTERNAL MEDICINE

## 2022-05-26 PROCEDURE — 96417 CHEMO IV INFUS EACH ADDL SEQ: CPT

## 2022-05-26 PROCEDURE — 96375 TX/PRO/DX INJ NEW DRUG ADDON: CPT

## 2022-05-26 PROCEDURE — 6370000000 HC RX 637 (ALT 250 FOR IP): Performed by: INTERNAL MEDICINE

## 2022-05-26 PROCEDURE — 96377 APPLICATON ON-BODY INJECTOR: CPT

## 2022-05-26 PROCEDURE — 1111F DSCHRG MED/CURRENT MED MERGE: CPT | Performed by: INTERNAL MEDICINE

## 2022-05-26 PROCEDURE — 96367 TX/PROPH/DG ADDL SEQ IV INF: CPT

## 2022-05-26 PROCEDURE — 6360000002 HC RX W HCPCS: Performed by: INTERNAL MEDICINE

## 2022-05-26 PROCEDURE — 1036F TOBACCO NON-USER: CPT | Performed by: INTERNAL MEDICINE

## 2022-05-26 PROCEDURE — 96411 CHEMO IV PUSH ADDL DRUG: CPT

## 2022-05-26 PROCEDURE — G8420 CALC BMI NORM PARAMETERS: HCPCS | Performed by: INTERNAL MEDICINE

## 2022-05-26 PROCEDURE — 80053 COMPREHEN METABOLIC PANEL: CPT

## 2022-05-26 PROCEDURE — G8428 CUR MEDS NOT DOCUMENT: HCPCS | Performed by: INTERNAL MEDICINE

## 2022-05-26 PROCEDURE — 3017F COLORECTAL CA SCREEN DOC REV: CPT | Performed by: INTERNAL MEDICINE

## 2022-05-26 RX ORDER — ONDANSETRON 2 MG/ML
8 INJECTION INTRAMUSCULAR; INTRAVENOUS
Status: CANCELLED | OUTPATIENT
Start: 2022-05-26

## 2022-05-26 RX ORDER — ACETAMINOPHEN 325 MG/1
1000 TABLET ORAL ONCE
Status: CANCELLED | OUTPATIENT
Start: 2022-05-26

## 2022-05-26 RX ORDER — ALBUTEROL SULFATE 90 UG/1
4 AEROSOL, METERED RESPIRATORY (INHALATION) PRN
Status: CANCELLED | OUTPATIENT
Start: 2022-05-26

## 2022-05-26 RX ORDER — SODIUM CHLORIDE 0.9 % (FLUSH) 0.9 %
5-40 SYRINGE (ML) INJECTION PRN
Status: DISCONTINUED | OUTPATIENT
Start: 2022-05-26 | End: 2022-05-27 | Stop reason: HOSPADM

## 2022-05-26 RX ORDER — SODIUM CHLORIDE 9 MG/ML
INJECTION, SOLUTION INTRAVENOUS CONTINUOUS
Status: CANCELLED | OUTPATIENT
Start: 2022-05-26

## 2022-05-26 RX ORDER — FAMOTIDINE 10 MG/ML
20 INJECTION, SOLUTION INTRAVENOUS
Status: CANCELLED | OUTPATIENT
Start: 2022-05-26

## 2022-05-26 RX ORDER — HEPARIN SODIUM (PORCINE) LOCK FLUSH IV SOLN 100 UNIT/ML 100 UNIT/ML
500 SOLUTION INTRAVENOUS PRN
Status: DISCONTINUED | OUTPATIENT
Start: 2022-05-26 | End: 2022-05-27 | Stop reason: HOSPADM

## 2022-05-26 RX ORDER — SODIUM CHLORIDE 9 MG/ML
5-40 INJECTION INTRAVENOUS PRN
Status: CANCELLED | OUTPATIENT
Start: 2022-05-26

## 2022-05-26 RX ORDER — SODIUM CHLORIDE 9 MG/ML
25 INJECTION, SOLUTION INTRAVENOUS PRN
Status: CANCELLED | OUTPATIENT
Start: 2022-05-26

## 2022-05-26 RX ORDER — DIPHENHYDRAMINE HYDROCHLORIDE 50 MG/ML
25 INJECTION INTRAMUSCULAR; INTRAVENOUS ONCE
Status: COMPLETED | OUTPATIENT
Start: 2022-05-26 | End: 2022-05-26

## 2022-05-26 RX ORDER — SODIUM CHLORIDE 9 MG/ML
20 INJECTION, SOLUTION INTRAVENOUS ONCE
Status: CANCELLED | OUTPATIENT
Start: 2022-05-26 | End: 2022-05-26

## 2022-05-26 RX ORDER — PALONOSETRON 0.05 MG/ML
0.25 INJECTION, SOLUTION INTRAVENOUS ONCE
Status: COMPLETED | OUTPATIENT
Start: 2022-05-26 | End: 2022-05-26

## 2022-05-26 RX ORDER — ACETAMINOPHEN 325 MG/1
650 TABLET ORAL
Status: CANCELLED | OUTPATIENT
Start: 2022-05-26

## 2022-05-26 RX ORDER — SODIUM CHLORIDE 0.9 % (FLUSH) 0.9 %
5-40 SYRINGE (ML) INJECTION PRN
Status: CANCELLED | OUTPATIENT
Start: 2022-05-26

## 2022-05-26 RX ORDER — EPINEPHRINE 1 MG/ML
0.3 INJECTION, SOLUTION, CONCENTRATE INTRAVENOUS PRN
Status: CANCELLED | OUTPATIENT
Start: 2022-05-26

## 2022-05-26 RX ORDER — SODIUM CHLORIDE 9 MG/ML
20 INJECTION, SOLUTION INTRAVENOUS ONCE
Status: COMPLETED | OUTPATIENT
Start: 2022-05-26 | End: 2022-05-27

## 2022-05-26 RX ORDER — DIPHENHYDRAMINE HYDROCHLORIDE 50 MG/ML
50 INJECTION INTRAMUSCULAR; INTRAVENOUS
Status: CANCELLED | OUTPATIENT
Start: 2022-05-26

## 2022-05-26 RX ORDER — DEXAMETHASONE SODIUM PHOSPHATE 10 MG/ML
10 INJECTION, SOLUTION INTRAMUSCULAR; INTRAVENOUS ONCE
Status: COMPLETED | OUTPATIENT
Start: 2022-05-26 | End: 2022-05-26

## 2022-05-26 RX ORDER — ACETAMINOPHEN 500 MG
1000 TABLET ORAL ONCE
Status: COMPLETED | OUTPATIENT
Start: 2022-05-26 | End: 2022-05-26

## 2022-05-26 RX ORDER — DIPHENHYDRAMINE HYDROCHLORIDE 50 MG/ML
25 INJECTION INTRAMUSCULAR; INTRAVENOUS ONCE
Status: CANCELLED | OUTPATIENT
Start: 2022-05-26

## 2022-05-26 RX ORDER — PALONOSETRON 0.05 MG/ML
0.25 INJECTION, SOLUTION INTRAVENOUS ONCE
Status: CANCELLED | OUTPATIENT
Start: 2022-05-26 | End: 2022-05-26

## 2022-05-26 RX ORDER — MEPERIDINE HYDROCHLORIDE 50 MG/ML
12.5 INJECTION INTRAMUSCULAR; INTRAVENOUS; SUBCUTANEOUS PRN
Status: CANCELLED | OUTPATIENT
Start: 2022-05-26

## 2022-05-26 RX ORDER — HEPARIN SODIUM (PORCINE) LOCK FLUSH IV SOLN 100 UNIT/ML 100 UNIT/ML
500 SOLUTION INTRAVENOUS PRN
Status: CANCELLED | OUTPATIENT
Start: 2022-05-26

## 2022-05-26 RX ADMIN — HEPARIN 500 UNITS: 100 SYRINGE at 14:09

## 2022-05-26 RX ADMIN — CYCLOPHOSPHAMIDE 1380 MG: 200 INJECTION, SOLUTION INTRAVENOUS at 12:05

## 2022-05-26 RX ADMIN — SODIUM CHLORIDE, PRESERVATIVE FREE 10 ML: 5 INJECTION INTRAVENOUS at 14:08

## 2022-05-26 RX ADMIN — VINCRISTINE SULFATE 2 MG: 1 INJECTION, SOLUTION INTRAVENOUS at 13:43

## 2022-05-26 RX ADMIN — PALONOSETRON 0.25 MG: 0.05 INJECTION, SOLUTION INTRAVENOUS at 09:28

## 2022-05-26 RX ADMIN — DIPHENHYDRAMINE HYDROCHLORIDE 25 MG: 50 INJECTION, SOLUTION INTRAMUSCULAR; INTRAVENOUS at 09:28

## 2022-05-26 RX ADMIN — PEGFILGRASTIM 6 MG: KIT SUBCUTANEOUS at 14:06

## 2022-05-26 RX ADMIN — FOSAPREPITANT 150 MG: 150 INJECTION, POWDER, LYOPHILIZED, FOR SOLUTION INTRAVENOUS at 09:43

## 2022-05-26 RX ADMIN — SODIUM CHLORIDE 690 MG: 9 INJECTION, SOLUTION INTRAVENOUS at 10:21

## 2022-05-26 RX ADMIN — ACETAMINOPHEN 1000 MG: 500 TABLET ORAL at 09:27

## 2022-05-26 RX ADMIN — SODIUM CHLORIDE 20 ML/HR: 9 INJECTION, SOLUTION INTRAVENOUS at 09:33

## 2022-05-26 RX ADMIN — DEXAMETHASONE SODIUM PHOSPHATE 10 MG: 10 INJECTION, SOLUTION INTRAMUSCULAR; INTRAVENOUS at 09:28

## 2022-05-26 RX ADMIN — SODIUM CHLORIDE 92 MG: 9 INJECTION, SOLUTION INTRAVENOUS at 12:39

## 2022-05-26 ASSESSMENT — PAIN SCALES - GENERAL: PAINLEVEL_OUTOF10: 0

## 2022-06-02 ENCOUNTER — OFFICE VISIT (OUTPATIENT)
Dept: CARDIOLOGY CLINIC | Age: 72
End: 2022-06-02
Payer: MEDICARE

## 2022-06-02 VITALS
HEART RATE: 82 BPM | BODY MASS INDEX: 25.11 KG/M2 | DIASTOLIC BLOOD PRESSURE: 78 MMHG | HEIGHT: 67 IN | WEIGHT: 160 LBS | SYSTOLIC BLOOD PRESSURE: 128 MMHG | OXYGEN SATURATION: 99 %

## 2022-06-02 DIAGNOSIS — I48.92 ATRIAL FLUTTER WITH RAPID VENTRICULAR RESPONSE (HCC): Primary | ICD-10-CM

## 2022-06-02 PROCEDURE — G8417 CALC BMI ABV UP PARAM F/U: HCPCS | Performed by: INTERNAL MEDICINE

## 2022-06-02 PROCEDURE — G8427 DOCREV CUR MEDS BY ELIG CLIN: HCPCS | Performed by: INTERNAL MEDICINE

## 2022-06-02 PROCEDURE — 3017F COLORECTAL CA SCREEN DOC REV: CPT | Performed by: INTERNAL MEDICINE

## 2022-06-02 PROCEDURE — 1036F TOBACCO NON-USER: CPT | Performed by: INTERNAL MEDICINE

## 2022-06-02 PROCEDURE — 1111F DSCHRG MED/CURRENT MED MERGE: CPT | Performed by: INTERNAL MEDICINE

## 2022-06-02 PROCEDURE — 99213 OFFICE O/P EST LOW 20 MIN: CPT | Performed by: INTERNAL MEDICINE

## 2022-06-02 PROCEDURE — 93000 ELECTROCARDIOGRAM COMPLETE: CPT | Performed by: INTERNAL MEDICINE

## 2022-06-02 PROCEDURE — 1123F ACP DISCUSS/DSCN MKR DOCD: CPT | Performed by: INTERNAL MEDICINE

## 2022-06-02 RX ORDER — AMIODARONE HYDROCHLORIDE 200 MG/1
200 TABLET ORAL DAILY
Qty: 7 TABLET | Refills: 5 | Status: SHIPPED | OUTPATIENT
Start: 2022-06-02 | End: 2022-06-27 | Stop reason: SDUPTHER

## 2022-06-02 RX ORDER — DILTIAZEM HYDROCHLORIDE 120 MG/1
120 CAPSULE, COATED, EXTENDED RELEASE ORAL DAILY
Qty: 30 CAPSULE | Refills: 5 | Status: SHIPPED | OUTPATIENT
Start: 2022-06-02

## 2022-06-02 NOTE — PROGRESS NOTES
Erroll Snellen is a 70 y.o. male presents with paroxysmal atrial fibrillation for which he underwent ARIADNA guided cardioversion in the hospital a couple of weeks ago. He is now on diltiazem, amiodarone, and Xarelto. He did not convert immediately after the cardioversion but eventually chemically converted on amiodarone. He is down to 1 pill a day. Have chronic lymphoma and is taking chemotherapy weekly. Review of Systems   Constitutional: Negative for fever, chills, diaphoresis, activity change, appetite change, fatigue and unexpected weight change. Eyes: Negative for photophobia, pain, redness and visual disturbance. Respiratory: Negative for apnea, cough, chest tightness, shortness of breath, wheezing and stridor. Cardiovascular: Negative for chest pain, palpitations and leg swelling. Gastrointestinal: Negative for abdominal distention. Genitourinary: Negative for dysuria, urgency and frequency. Musculoskeletal: Negative for myalgias, arthralgias and gait problem. Skin: Negative for color change, pallor, rash and wound. Neurological: Negative for dizziness, tremors, speech difficulty, weakness and numbness. Hematological: Does not bruise/bleed easily. Psychiatric/Behavioral: Negative.           Social History     Socioeconomic History    Marital status:      Spouse name: Not on file    Number of children: Not on file    Years of education: Not on file    Highest education level: Not on file   Occupational History    Not on file   Tobacco Use    Smoking status: Former Smoker     Packs/day: 2.00     Years: 15.00     Pack years: 30.00     Types: Cigarettes     Quit date: 26     Years since quittin.4    Smokeless tobacco: Never Used   Vaping Use    Vaping Use: Never used   Substance and Sexual Activity    Alcohol use: Yes     Comment: Occasional     Drug use: No    Sexual activity: Not on file   Other Topics Concern    Not on file   Social History Narrative    Not on file     Social Determinants of Health     Financial Resource Strain:     Difficulty of Paying Living Expenses: Not on file   Food Insecurity:     Worried About Running Out of Food in the Last Year: Not on file    [place] of Food in the Last Year: Not on file   Transportation Needs:     Lack of Transportation (Medical): Not on file    Lack of Transportation (Non-Medical): Not on file   Physical Activity:     Days of Exercise per Week: Not on file    Minutes of Exercise per Session: Not on file   Stress:     Feeling of Stress : Not on file   Social Connections:     Frequency of Communication with Friends and Family: Not on file    Frequency of Social Gatherings with Friends and Family: Not on file    Attends [employer] Services: Not on file    Active Member of [address] [place] [address] or Organizations: Not on file    Attends Club or Organization Meetings: Not on file    Marital Status: Not on file   Intimate Partner Violence:     Fear of Current or Ex-Partner: Not on file    Emotionally Abused: Not on file    Physically Abused: Not on file    Sexually Abused: Not on file   Housing Stability:     Unable to Pay for Housing in the Last Year: Not on file    Number of [place] in the Last Year: Not on file    Unstable Housing in the Last Year: Not on file       No Known Allergies      Current Outpatient Medications:     amiodarone (CORDARONE) 200 MG tablet, Take 1 tablet by mouth daily for 7 days, Disp: 7 tablet, Rfl: 5    dilTIAZem (CARDIZEM CD) 120 MG extended release capsule, Take 1 capsule by mouth daily, Disp: 30 capsule, Rfl: 5    rivaroxaban (XARELTO) 20 MG TABS tablet, Take 1 tablet by mouth daily (with breakfast), Disp: 30 tablet, Rfl: 5    predniSONE (DELTASONE) 50 MG tablet, TAKE 2 TABLETS BY MOUTH ONCE DAILY FOR 5 DAYS, Disp: , Rfl:     lidocaine-prilocaine (EMLA) 2.5-2.5 % cream, Apply topically to port area and cover with plastic wrap one hour prior to treatment. , Disp: 1 each, Rfl: 1   ondansetron (ZOFRAN) 4 MG tablet, Take 1 tablet by mouth every 8 hours as needed for Nausea or Vomiting, Disp: 30 tablet, Rfl: 1    allopurinol (ZYLOPRIM) 300 MG tablet, Take 1 tablet by mouth daily, Disp: 30 tablet, Rfl: 5    turmeric (QC TUMERIC COMPLEX) 500 MG CAPS, Take by mouth in the morning, at noon, and at bedtime , Disp: , Rfl:     zinc gluconate 50 MG tablet, Take 50 mg by mouth daily, Disp: , Rfl:     ferrous sulfate (IRON 325) 325 (65 Fe) MG tablet, Take 325 mg by mouth daily (with breakfast), Disp: , Rfl:     KRILL OIL PO, Take by mouth, Disp: , Rfl:     Red Yeast Rice Extract (RED YEAST RICE PO), Take by mouth, Disp: , Rfl:     Probiotic Product (PROBIOTIC-10 PO), Take by mouth, Disp: , Rfl:     Zn-Pyg Afri-Nettle-Saw Palmet (SAW PALMETTO COMPLEX PO), Take by mouth, Disp: , Rfl:     Multiple Vitamins-Minerals (THERAPEUTIC MULTIVITAMIN-MINERALS) tablet, Take 1 tablet by mouth daily, Disp: , Rfl:     Vitamin D (CHOLECALCIFEROL) 1000 UNITS CAPS capsule, Take 1,000 Units by mouth daily , Disp: , Rfl:     niacin 500 MG CR capsule, Take 500 mg by mouth nightly , Disp: , Rfl:     aspirin 81 MG tablet, Take 81 mg by mouth daily, Disp: , Rfl:     PE:  Vitals:    06/02/22 0916   BP: 128/78   Pulse: 82   SpO2: 99%   Weight: 160 lb (72.6 kg)   Height: 5' 7\" (1.702 m)       Estimated body mass index is 25.06 kg/m² as calculated from the following:    Height as of this encounter: 5' 7\" (1.702 m). Weight as of this encounter: 160 lb (72.6 kg). Constitutional: He is oriented to person, place, and time. He appears well-developed and well-nourished in no acute distress. Neck:  Neck supple without JVD present. No trachea deviation present. No thyromegaly present. Eyes:Conjunctivae and EOM are normal. Pupils equal and reactive to light. ENT:Hearing appears normal.conjunctiva and lids are normal, ears and nose appear normal.  Cardiovascular: Normal rate, S1-S2 regular rhythm, normal heart sounds.   3 out of 6 holosystolic murmur ascultated. No gallop and no friction rub. No carotid bruits. No peripheral edema. Pulmonary/Chest:  Lungs clear to auscultation bilaterally without evidence of respiratory distress. He without wheezes. He without rales or ronchi. Musculoskeletal: Normal range of motion. Gait is normal  Head is normocephalic and atraumatic. Skin: Skin is warm and dry without rash or pallor. Psychiatric:He is alert and oriented to person, place, and time. He has a normal mood and affect. His behavior is normal. Thought content normal.       Lab Results   Component Value Date    CREATININE 0.8 05/26/2022    CREATININE 0.7 05/18/2022    CREATININE 0.5 05/17/2022    HGB 11.6 05/26/2022    HGB 10.5 05/18/2022    HGB 11.4 05/17/2022    PROBNP 598 05/16/2022           ECG 06/02/22    Sinus rhythm         Assessment, Recommendations, & Plan:  70 y.o. male with paroxysmal atrial fibrillation which responded to chemical conversion with amiodarone. We will continue to prescribe low-dose amiodarone for 6 months and if he has no symptoms consistent with atrial fibrillation consider weaning him off this at that time. His echo revealed normal systolic function and mild mitral regurgitation        Disposition - RTC in 6 months or sooner if needed      Please do not hesitate to contact me for any questions or concerns. Dr. Michelle Cisneros.  Chris  Electrophysiology and Cardiology  [facility] [facility] [place], Cardiology  [phone]

## 2022-06-10 ENCOUNTER — HOSPITAL ENCOUNTER (OUTPATIENT)
Dept: CT IMAGING | Age: 72
Discharge: HOME | End: 2022-06-10
Payer: MEDICARE

## 2022-06-10 DIAGNOSIS — C85.10 LARGE B-CELL LYMPHOMA (HCC): ICD-10-CM

## 2022-06-10 DIAGNOSIS — C85.98 LYMPHOMA OF LYMPH NODES OF MULTIPLE SITES (HCC): ICD-10-CM

## 2022-06-10 PROCEDURE — 74176 CT ABD & PELVIS W/O CONTRAST: CPT | Performed by: RADIOLOGY

## 2022-06-10 PROCEDURE — 70490 CT SOFT TISSUE NECK W/O DYE: CPT

## 2022-06-10 PROCEDURE — 70490 CT SOFT TISSUE NECK W/O DYE: CPT | Performed by: RADIOLOGY

## 2022-06-10 PROCEDURE — 74176 CT ABD & PELVIS W/O CONTRAST: CPT

## 2022-06-10 PROCEDURE — 71250 CT THORAX DX C-: CPT | Performed by: RADIOLOGY

## 2022-06-10 PROCEDURE — 71250 CT THORAX DX C-: CPT

## 2022-06-13 NOTE — PROGRESS NOTES
MEDICAL ONCOLOGY PROGRESS NOTE    Pt Name: Indy Baig  MRN: [id number]  YOB: 1950  Date of evaluation: 6/16/2022  History Obtained From:  patient, electronic medical record    HISTORY OF PRESENT ILLNESS:  The patient has a diagnosis of diffuse large B-cell lymphoma, ABC phenotype. Patient is current on treatment with R-CHOP. Treatment started 4/8/2022. The patient has received 4 cycles of treatment. He had CT scans performed to assess disease status. He has been tolerating treatment well otherwise except for mild fatigue. He is on Xarelto for a prior history of A. fib and also aspirin. Denies any bleeding. Patient tells me that his stamina has improved. He also feels that it is easier to breathe. Diagnosis  · Diffuse large B-cell lymphoma, March 2022  · c-Myc, BCL6 and BCL2 rearrangement pending  · Stage IIIB    Treatment summary  · 4/8/2022- Initiated R-CHOP + GCSF every 3 weeks x 6 cycles    Hematology history  Kia Anne was first seen by me on 3/24/2022. The patient was referred by his primary care provider for findings of generalized lymphadenopathy. The patient has had symptoms of weight loss, night sweats and low-grade fever. This seems has been going on for many months now. Patient had Covid 19 infection last year. He had CT scans that showed generalized adenopathy. · 3/22/2022-CT soft tissue neck showed findings of cervical adenopathy identified, including a right level 3 node measuring 1.6 cm in greatest axial diameter (series 4-image 95). There is a left level 5 lymph node measuring 1.7 cm on axial image 102. Multiple enlarged left level 4 nodes, including a 2.3 cm node on axial image 114. Adenopathy extends down into the left supraclavicular fossa and there is bulky adenopathy also appreciated in the upper mediastinum. · 3/22/2022-CT chest with contrast showed findings of mediastinal adenopathy. Subcarinal lymph nodes are present.  Right hilar lymph nodes are present. Left para-aortic adenopathy is present in the abdomen. Concern for lymphoma. · 3/22/2002-CT abdomen/pelvis showed spleen is enlarged measuring 14 cm craniocaudal dimension. Bilateral renal cysts including dominant 5.4 cm RIGHT upper pole exophytic renal cyst. 3 mm nonobstructing LEFT lower pole renal calculus. Prostate is mildly enlarged measuring 4.8 cm transverse dimension. Bulky retroperitoneal lymphadenopathy is noted. Noted conglomerate in the LEFT periaortic region on image 34 series 4 measures 6.0 x 3.8 cm. Enlarged gastrohepatic ligament lymph nodes with reference node on image 23 measuring 14 mm short axis dimension. No enlarged pelvic or inguinal lymph nodes identified. Bilateral chronic L5 pars defects with grade 1 anterolisthesis of L5 on S1. No acute osseous finding. · 3/24/2022- (H), hemoglobin 10.1/MCV 93, platelets 128,057, WBC 8.7  · 3/24/2022-she was first seen by me. Recommended excisional biopsy left supraclavicular lymph node. · 3/24/2022  B2M 2.8, Uric Acid 3.6,   · 3/28/2022 Left Cervical Lymphadenopathy (BHP): The dominant nodes are seen in the left level 4  and left level 5 neck as well as in the left supraclavicular fossa and upper mediastinum. Degenerative spinal stenosis. Atheromatous calcification in the carotid bulbs. · 3/28/2022-excisional anibal biopsy. Final pathology pending. Preliminary results suggestive of high-grade lymphoma. Recommend allopurinol 300 mg p.o. daily. Recommend a PET scan and bone marrow biopsy. · 4/4/2022-IHC studies from excisional node biopsy consistent with diffuse large B-cell lymphoma, ABC phenotype. BCL2, BCL6 and c-Myc rearrangement in process. Recommended R-CHOP x6 cycles. Started on allopurinol. · 4/6/22 Bone marrow ([facility]): BONE MARROW: Normocellular (30-35% cellular) marrow with multilineage hematopoiesis. Negative for lymphoid infiltrates. Negative for dysplasia, no increase in blasts.  Storage iron present, no ring sideroblasts. FLOW CYTOMETRY: No B-cell monoclonality and no T-cell aberrant antigenic expression. The blasts are not increa we need to sed. CYTOGENETICS: Normal male karyotype. · 4/7/22 PET CT SKULL BASE TO MID THIGH  Extensive lymphadenopathy consistent with lymphoma. Many of these nodes are extremely metabolically active including SUV measurements of greater than 40-50 in some of the nodes. There is extensive adenopathy in the lower cervical chains including level 3, 4 and 5 nodes. Supraclavicular lymphadenopathy is present with extension into the superior mediastinum. Both anterior and middle mediastinal adenopathy as well as right hilar adenopathy is noted within the chest. A right retrocrural node, gastrohepatic ligament nodes and bulky left para-aortic nodes are also present all demonstrating intense abnormal metabolic activity. · 4/8/2022- Initiated R-CHOP + GCSF every 3 weeks x 6 cycles  · 5/12/2022 2D Echocardiogram Left ventricle normal chamber size and thickness  Preserved systolic function with estimated ejection fraction of 54%  However, GLS is slightly decreased especially the apex. This is more sensitive to predict chemotherapy induced cardiomyopathy then ejection  fraction  No regional wall motion abnormality Mitral annulus calcification with moderate degree of mitral leaflet  thickening and normal mobility  Bicuspid aortic valve with gradient of 10 mm. Judging from stroke-volume  index which is 27.7, which is reduced. This may represent low gradient, normal ejection fraction, low stroke-volume significant aortic stenosis. If patient is symptomatic, please refer to cardiology for ARIADNA  · 5/16/2022 Echo ARIADNA LVEF 55%. Mildly thickened aortic valve leaflets with preserved leaflet mobility. Moderate TR. · 6/10/2022 CT Chest WO Contrast Chronic fibrosing interstitial lung disease - fibrotic NSIP versus probable UIP. Etiology could be drug induced.  Few subcentimeter sized mediastinal lymph nodes are seen at bilateral upper paratracheal, bilateral lower paratracheal and subcarinal location. Multiple small liver cyst.Cholelithiasis without cholecystitis. Right renal simple cyst.Degenerative changes in the spine. Multiple subcentimeter bilateral nonobstructive calyceal calcifications. Recommendation:Follow up as clinically indicated. · 6/10/2022 CT Abd/Pelvis WO Contrast There is decrease in the size of retroperitoneal lymphadenopathy compared to prior study, suggestive of response to treatment. Cholelithiasis without evidence of cholecystitis. Multiple simple liver cysts. Recommendation: Follow up as clinically indicated. ·  6/10/2022 CT Soft Tissue Neck WO Contrast Few enlarge nodes are noted in left level IV. Few sub cm sized noted in level Ia and bilateral level Ib. Recommendation:Follow up as clinically indicated. · 6/16/2022-completion 3 cycles R-CHOP. CT chest abdomen pelvis and soft tissue neck showed interval response to treatment.     Past Medical History:    Past Medical History:   Diagnosis Date    Cancer [facility])     lymphoma    Chronic neck pain     Herniated disc, cervical     HTN (hypertension)        Past Surgical History:    Past Surgical History:   Procedure Laterality Date    LYMPH NODE BIOPSY      left side of neck    PORT SURGERY N/A 4/8/2022    SINGLE LUMEN PORT PLACEMENT WITH US & FLUORO performed by Ronak Ward MD at [place] [address] History:    Marital status:  Smoking status:Former smoker, Quit more 25 years ago  ETOH status:No   Resides:[place]    Family History:   Family History   Problem Relation Age of Onset    Cancer Mother         lymphoma    Heart Disease Father        Current Hospital Medications:    Current Outpatient Medications   Medication Sig Dispense Refill    amiodarone (CORDARONE) 200 MG tablet Take 1 tablet by mouth daily for 7 days 7 tablet 5    dilTIAZem (CARDIZEM CD) 120 MG extended release capsule Take 1 capsule by mouth daily 30 capsule 5  rivaroxaban (XARELTO) 20 MG TABS tablet Take 1 tablet by mouth daily (with breakfast) 30 tablet 5    predniSONE (DELTASONE) 50 MG tablet TAKE 2 TABLETS BY MOUTH ONCE DAILY FOR 5 DAYS      lidocaine-prilocaine (EMLA) 2.5-2.5 % cream Apply topically to port area and cover with plastic wrap one hour prior to treatment. 1 each 1    ondansetron (ZOFRAN) 4 MG tablet Take 1 tablet by mouth every 8 hours as needed for Nausea or Vomiting 30 tablet 1    allopurinol (ZYLOPRIM) 300 MG tablet Take 1 tablet by mouth daily 30 tablet 5    turmeric (QC TUMERIC COMPLEX) 500 MG CAPS Take by mouth in the morning, at noon, and at bedtime       zinc gluconate 50 MG tablet Take 50 mg by mouth daily      ferrous sulfate (IRON 325) 325 (65 Fe) MG tablet Take 325 mg by mouth daily (with breakfast)      KRILL OIL PO Take by mouth      Red Yeast Rice Extract (RED YEAST RICE PO) Take by mouth      Probiotic Product (PROBIOTIC-10 PO) Take by mouth      Zn-Pyg Afri-Nettle-Saw Palmet (SAW PALMETTO COMPLEX PO) Take by mouth      Multiple Vitamins-Minerals (THERAPEUTIC MULTIVITAMIN-MINERALS) tablet Take 1 tablet by mouth daily      Vitamin D (CHOLECALCIFEROL) 1000 UNITS CAPS capsule Take 1,000 Units by mouth daily       niacin 500 MG CR capsule Take 500 mg by mouth nightly       aspirin 81 MG tablet Take 81 mg by mouth daily       No current facility-administered medications for this visit.      Facility-Administered Medications Ordered in Other Visits   Medication Dose Route Frequency Provider Last Rate Last Admin    0.9 % sodium chloride infusion  20 mL/hr IntraVENous Once Porsche Sanabria MD 20 mL/hr at 06/16/22 0949 20 mL/hr at 06/16/22 0949    pegfilgrastim (NEULASTA) on-body injector 6 mg  6 mg SubCUTAneous Once Porsche Sanabria MD        cyclophosphamide (CYTOXAN) 1,380 mg in sodium chloride 0.9 % 250 mL chemo IVPB  750 mg/m2 (Treatment Plan Recorded) IntraVENous Once Porsche Sanabria MD        DOXOrubicin HCl (ADRIAMYCIN) 92 mg in sodium chloride 0.9 % 100 mL chemo IVPB  50 mg/m2 (Treatment Plan Recorded) IntraVENous Once Nadir Daniel MD        vinCRIStine (ONCOVIN) 2 mg in sodium chloride 0.9 % 50 mL chemo IVPB  2 mg IntraVENous Once Nadir Daniel MD        sodium chloride flush 0.9 % injection 5-40 mL  5-40 mL IntraVENous PRN Nadir Daniel MD        heparin flush 100 UNIT/ML injection 500 Units  500 Units IntraCATHeter PRN Nadir Daniel MD           Allergies: No Known Allergies      Subjective   REVIEW OF SYSTEMS:   CONSTITUTIONAL: no fever, no night sweats, fatigue;  HEENT: no blurring of vision, no double vision, no hearing difficulty, no tinnitus, no ulceration, no dysplasia, no epistaxis;   LUNGS: no cough, no hemoptysis, no wheeze,  no shortness of breath;  CARDIOVASCULAR: no palpitation, no chest pain, no shortness of breath;  GI: no abdominal pain, no nausea, no vomiting, no diarrhea, no constipation;  KARINA: no dysuria, no hematuria, no frequency or urgency, no nephrolithiasis;  MUSCULOSKELETAL: no joint pain, no swelling, no stiffness;  ENDOCRINE: no polyuria, no polydipsia, no cold or heat intolerance;  HEMATOLOGY: no easy bruising or bleeding, no history of clotting disorder;  DERMATOLOGY: no skin rash, no eczema, no pruritus;  PSYCHIATRY: no depression, no anxiety, no panic attacks, no suicidal ideation, no homicidal ideation;  NEUROLOGY: no syncope, no seizures, no numbness or tingling of hands, no numbness or tingling of feet, no paresis;      Objective   BP (!) 168/84   Pulse 90   Temp 97.7 °F (36.5 °C)   Ht 5' 7\" (1.702 m)   Wt 159 lb 14.4 oz (72.5 kg)   BMI 25.04 kg/m²   Wt Readings from Last 3 Encounters:   06/16/22 159 lb 14.4 oz (72.5 kg)   06/02/22 160 lb (72.6 kg)   05/26/22 156 lb 8 oz (71 kg)       PHYSICAL EXAM:  CONSTITUTIONAL: Alert, appropriate, no acute distress  EYES: Non icteric, EOM intact, pupils equal round   ENT: Mucus membranes moist, no oral pharyngeal lesions, external inspection of ears and nose are normal.  NECK: Supple, no masses. No palpable thyroid mass  CHEST/LUNGS: CTA bilaterally, normal respiratory effort   CARDIOVASCULAR: RRR, no murmurs. No lower extremity edema  ABDOMEN: soft non-tender, active bowel sounds, no HSM. No palpable masses  EXTREMITIES: warm, full ROM in all 4 extremities, no focal weakness. SKIN: warm, dry with no rashes or lesions  LYMPH: No cervical, clavicular, axillary, or inguinal lymphadenopathy  NEUROLOGIC: follows commands, non focal     LABORATORY RESULTS REVIEWED/ANALYZED BY ME:  Lab Results   Component Value Date    WBC 7.22 06/16/2022    HGB 11.9 (L) 06/16/2022    HCT 36.7 (L) 06/16/2022    MCV 97.1 (H) 06/16/2022     06/16/2022     Lab Results   Component Value Date    NEUTROABS 5.57 06/16/2022     Lab Results   Component Value Date     06/16/2022    K 4.1 06/16/2022     06/16/2022    CO2 25 06/16/2022    BUN 19 06/16/2022    CREATININE 0.8 06/16/2022    GLUCOSE 111 (H) 06/16/2022    CALCIUM 8.6 06/16/2022    PROT 6.8 06/16/2022    LABALBU 4.1 06/16/2022    BILITOT 0.4 06/16/2022    ALKPHOS 82 06/16/2022    AST 28 06/16/2022    ALT 16 (L) 06/16/2022    LABGLOM >60 06/16/2022    GFRAA >59 05/18/2022    AGRATIO 1.5 09/19/2016    GLOB 2.7 06/16/2022         RADIOLOGY STUDIES REPORT REVIEWED/INTERPRETED BY ME:  6/10/2022 CT Chest WO Contrast Chronic fibrosing interstitial lung disease - fibrotic NSIP versus probable UIP. Etiology could be drug induced. Few subcentimeter sized mediastinal lymph nodes are seen at bilateral upper paratracheal, bilateral lower paratracheal and subcarinal location. Multiple small liver cyst.Cholelithiasis without cholecystitis. Right renal simple cyst.  Degenerative changes in the spine. Multiple subcentimeter bilateral nonobstructive calyceal calcifications. Recommendation:Follow up as clinically indicated.     6/10/2022 CT Abd/Pelvis WO Contrast There is decrease in the size of retroperitoneal lymphadenopathy compared to prior study, suggestive of response to treatment. Cholelithiasis without evidence of cholecystitis. Multiple simple liver cysts. Recommendation: Follow up as clinically indicated. 6/10/2022 CT Soft Tissue Neck WO Contrast Few enlarge nodes are noted in left level IV. Few sub cm sized noted in level Ia and bilateral level Ib. Recommendation:Follow up as clinically indicated. ASSESSMENT:  #Diffuse large B-cell lymphoma, stage IIIB, ABC phenotype (c-Myc(-), BCL-2 (+), BCL6(-)rearrangement)  -3/28/2022-excisional node biopsy by Dr[facility] Ry [facility].  -Path consistent with ABC phenotype DLBCL: Lymphoma as per Dr. Aida Maurer. -PET scan and bone marrow biopsy-ordered today on 4/4/2022.  -Persistent B symptoms.  - PET scan/bone marrow biopsy on 4/4/2022. (Negative for lymphoma involvement)  Extensive lymphadenopathy consistent with lymphoma. Many of these nodes are extremely metabolically active including SUV measurements of greater than 40-50 in some of the nodes. There is extensive adenopathy in the lower cervical chains including level 3, 4 and 5 nodes. Supraclavicular lymphadenopathy is present with extension into the superior mediastinum. Both anterior and middle mediastinal adenopathy as well as right hilar adenopathy is noted within the chest. A right retrocrural node, gastrohepatic ligament nodes and bulky left para-aortic nodes are also present all demonstrating intense abnormal metabolic activity. .      Recommended:  -R-CHOP x6 cycles  G-CSF support  6/10/2022-CT neck/C/A/P-interval response to treatment after 3 cycles. Proceed cycle #4 R-CHOP    Treatment related toxicity-fatigue, anemia    Pulmonary interstitial fibrosis-I reviewed the CT scan with the patient. He has complaints of short of breath on exertion. He has findings of interstitial fibrosis.   I offered a pulmonology consultation but he wants to defer this at this time.    PLAN:  · RTC with MD in treatment room 3 weeks   · C#4/6 R-CHOP + GCSF today and every 3 weeks  · Repeat PET scan after C# 6  · Continue Allopurinol 300mg daily  · Consider Pulmonology referral in the future when treatments complete      Jeff YANEZ am pre charting  as Medical Assistant for Alexia Malone MD. Electronically signed by Jeff Travis MA on 6/16/2022 at 4:51 PM CDT. Marvin Cisneros am scribing for Alexia Malone MD. Electronically signed by Lizette Osman, RN on 6/16/2022 at 10:46 AM CDT. I, Dr Ollie Field, personally performed the services described in this documentation as scribed by Lizette Osman, MAO in my presence and is both accurate and complete. I have seen, examined and reviewed this patient medication list, appropriate labs and imaging studies. I reviewed relevant medical records and others physicians notes. I discussed the plans of care with the patient. I answered all the questions to the patients satisfaction. I have also reviewed the chief complaint (CC) and part of the history (History of Present Illness (HPI), Past Family Social History [facility]), or Review of Systems (ROS) and made changes when appropriated.        (Please note that portions of this note were completed with a voice recognition program. Efforts were made to edit the dictations but occasionally words are mis-transcribed.)    Electronically signed by Alexia Malone MD on 6/16/2022 at 11:51 AM

## 2022-06-16 ENCOUNTER — OFFICE VISIT (OUTPATIENT)
Dept: HEMATOLOGY | Age: 72
End: 2022-06-16
Payer: MEDICARE

## 2022-06-16 ENCOUNTER — HOSPITAL ENCOUNTER (OUTPATIENT)
Dept: INFUSION THERAPY | Age: 72
Discharge: HOME | End: 2022-06-16
Payer: MEDICARE

## 2022-06-16 VITALS
DIASTOLIC BLOOD PRESSURE: 84 MMHG | WEIGHT: 159.9 LBS | HEIGHT: 67 IN | SYSTOLIC BLOOD PRESSURE: 168 MMHG | TEMPERATURE: 97.7 F | BODY MASS INDEX: 25.1 KG/M2 | HEART RATE: 90 BPM

## 2022-06-16 DIAGNOSIS — C85.10 LARGE B-CELL LYMPHOMA (HCC): ICD-10-CM

## 2022-06-16 DIAGNOSIS — T45.1X5D ADVERSE EFFECT OF CHEMOTHERAPY, SUBSEQUENT ENCOUNTER: ICD-10-CM

## 2022-06-16 DIAGNOSIS — T45.1X5A ANTINEOPLASTIC CHEMOTHERAPY INDUCED ANEMIA: ICD-10-CM

## 2022-06-16 DIAGNOSIS — C83.30 DIFFUSE LARGE B-CELL LYMPHOMA, UNSPECIFIED BODY REGION (HCC): Primary | ICD-10-CM

## 2022-06-16 DIAGNOSIS — Z01.818 EXAMINATION PRIOR TO CHEMOTHERAPY: ICD-10-CM

## 2022-06-16 DIAGNOSIS — Z51.12 ENCOUNTER FOR ANTINEOPLASTIC IMMUNOTHERAPY: ICD-10-CM

## 2022-06-16 DIAGNOSIS — C85.98 LYMPHOMA OF LYMPH NODES OF MULTIPLE SITES (HCC): ICD-10-CM

## 2022-06-16 DIAGNOSIS — D64.81 ANTINEOPLASTIC CHEMOTHERAPY INDUCED ANEMIA: ICD-10-CM

## 2022-06-16 DIAGNOSIS — Z51.11 CHEMOTHERAPY MANAGEMENT, ENCOUNTER FOR: ICD-10-CM

## 2022-06-16 DIAGNOSIS — Z71.89 CARE PLAN DISCUSSED WITH PATIENT: ICD-10-CM

## 2022-06-16 LAB
ALBUMIN SERPL-MCNC: 4.1 G/DL (ref 3.5–5.2)
ALP BLD-CCNC: 82 U/L (ref 40–130)
ALT SERPL-CCNC: 16 U/L (ref 21–72)
ANION GAP SERPL CALCULATED.3IONS-SCNC: 11 MMOL/L (ref 7–19)
AST SERPL-CCNC: 28 U/L (ref 17–59)
BILIRUB SERPL-MCNC: 0.4 MG/DL (ref 0.2–1.3)
BUN BLDV-MCNC: 19 MG/DL (ref 9–20)
CALCIUM SERPL-MCNC: 8.6 MG/DL (ref 8.4–10.2)
CHLORIDE BLD-SCNC: 103 MMOL/L (ref 98–111)
CO2: 25 MMOL/L (ref 22–29)
CREAT SERPL-MCNC: 0.8 MG/DL (ref 0.6–1.2)
GFR NON-AFRICAN AMERICAN: >60
GLOBULIN: 2.7 G/DL
GLUCOSE BLD-MCNC: 111 MG/DL (ref 74–106)
HCT VFR BLD CALC: 36.7 % (ref 40.1–51)
HEMOGLOBIN: 11.9 G/DL (ref 13.7–17.5)
LYMPHOCYTES ABSOLUTE: 0.64 K/UL (ref 1.18–3.74)
LYMPHOCYTES RELATIVE PERCENT: 8.9 % (ref 19.3–53.1)
MCH RBC QN AUTO: 31.5 PG (ref 25.7–32.2)
MCHC RBC AUTO-ENTMCNC: 32.4 G/DL (ref 32.3–36.5)
MCV RBC AUTO: 97.1 FL (ref 79–92.2)
MONOCYTES ABSOLUTE: 0.76 K/UL (ref 0.24–0.82)
MONOCYTES RELATIVE PERCENT: 10.5 % (ref 4.7–12.5)
NEUTROPHILS ABSOLUTE: 5.57 K/UL (ref 1.56–6.13)
NEUTROPHILS RELATIVE PERCENT: 77.2 % (ref 34–71.1)
PDW BLD-RTO: 18.9 % (ref 11.6–14.4)
PLATELET # BLD: 252 K/UL (ref 163–337)
PMV BLD AUTO: 9.7 FL (ref 7.4–10.4)
POTASSIUM SERPL-SCNC: 4.1 MMOL/L (ref 3.5–5.1)
RBC # BLD: 3.78 M/UL (ref 4.63–6.08)
SODIUM BLD-SCNC: 139 MMOL/L (ref 137–145)
TOTAL PROTEIN: 6.8 G/DL (ref 6.3–8.2)
WBC # BLD: 7.22 K/UL (ref 4.23–9.07)

## 2022-06-16 PROCEDURE — 2580000003 HC RX 258: Performed by: INTERNAL MEDICINE

## 2022-06-16 PROCEDURE — 96417 CHEMO IV INFUS EACH ADDL SEQ: CPT

## 2022-06-16 PROCEDURE — 80053 COMPREHEN METABOLIC PANEL: CPT

## 2022-06-16 PROCEDURE — 96415 CHEMO IV INFUSION ADDL HR: CPT

## 2022-06-16 PROCEDURE — 3017F COLORECTAL CA SCREEN DOC REV: CPT | Performed by: INTERNAL MEDICINE

## 2022-06-16 PROCEDURE — 1111F DSCHRG MED/CURRENT MED MERGE: CPT | Performed by: INTERNAL MEDICINE

## 2022-06-16 PROCEDURE — 96375 TX/PRO/DX INJ NEW DRUG ADDON: CPT

## 2022-06-16 PROCEDURE — 1123F ACP DISCUSS/DSCN MKR DOCD: CPT | Performed by: INTERNAL MEDICINE

## 2022-06-16 PROCEDURE — 1036F TOBACCO NON-USER: CPT | Performed by: INTERNAL MEDICINE

## 2022-06-16 PROCEDURE — 6370000000 HC RX 637 (ALT 250 FOR IP): Performed by: INTERNAL MEDICINE

## 2022-06-16 PROCEDURE — G8417 CALC BMI ABV UP PARAM F/U: HCPCS | Performed by: INTERNAL MEDICINE

## 2022-06-16 PROCEDURE — 6360000002 HC RX W HCPCS: Performed by: INTERNAL MEDICINE

## 2022-06-16 PROCEDURE — 96367 TX/PROPH/DG ADDL SEQ IV INF: CPT

## 2022-06-16 PROCEDURE — 85025 COMPLETE CBC W/AUTO DIFF WBC: CPT

## 2022-06-16 PROCEDURE — 96377 APPLICATON ON-BODY INJECTOR: CPT

## 2022-06-16 PROCEDURE — 96413 CHEMO IV INFUSION 1 HR: CPT

## 2022-06-16 PROCEDURE — 99214 OFFICE O/P EST MOD 30 MIN: CPT | Performed by: INTERNAL MEDICINE

## 2022-06-16 PROCEDURE — G8428 CUR MEDS NOT DOCUMENT: HCPCS | Performed by: INTERNAL MEDICINE

## 2022-06-16 RX ORDER — SODIUM CHLORIDE 9 MG/ML
25 INJECTION, SOLUTION INTRAVENOUS PRN
Status: CANCELLED | OUTPATIENT
Start: 2022-06-16

## 2022-06-16 RX ORDER — HEPARIN SODIUM (PORCINE) LOCK FLUSH IV SOLN 100 UNIT/ML 100 UNIT/ML
500 SOLUTION INTRAVENOUS PRN
Status: DISCONTINUED | OUTPATIENT
Start: 2022-06-16 | End: 2022-06-17 | Stop reason: HOSPADM

## 2022-06-16 RX ORDER — ACETAMINOPHEN 325 MG/1
650 TABLET ORAL
Status: CANCELLED | OUTPATIENT
Start: 2022-06-16

## 2022-06-16 RX ORDER — SODIUM CHLORIDE 9 MG/ML
20 INJECTION, SOLUTION INTRAVENOUS ONCE
Status: CANCELLED | OUTPATIENT
Start: 2022-06-16 | End: 2022-06-16

## 2022-06-16 RX ORDER — EPINEPHRINE 1 MG/ML
0.3 INJECTION, SOLUTION, CONCENTRATE INTRAVENOUS PRN
Status: CANCELLED | OUTPATIENT
Start: 2022-06-16

## 2022-06-16 RX ORDER — DEXAMETHASONE SODIUM PHOSPHATE 10 MG/ML
10 INJECTION, SOLUTION INTRAMUSCULAR; INTRAVENOUS ONCE
Status: COMPLETED | OUTPATIENT
Start: 2022-06-16 | End: 2022-06-16

## 2022-06-16 RX ORDER — DIPHENHYDRAMINE HYDROCHLORIDE 50 MG/ML
25 INJECTION INTRAMUSCULAR; INTRAVENOUS ONCE
Status: CANCELLED | OUTPATIENT
Start: 2022-06-16

## 2022-06-16 RX ORDER — SODIUM CHLORIDE 0.9 % (FLUSH) 0.9 %
5-40 SYRINGE (ML) INJECTION PRN
Status: CANCELLED | OUTPATIENT
Start: 2022-06-16

## 2022-06-16 RX ORDER — ACETAMINOPHEN 500 MG
1000 TABLET ORAL ONCE
Status: COMPLETED | OUTPATIENT
Start: 2022-06-16 | End: 2022-06-16

## 2022-06-16 RX ORDER — FAMOTIDINE 10 MG/ML
20 INJECTION, SOLUTION INTRAVENOUS
Status: CANCELLED | OUTPATIENT
Start: 2022-06-16

## 2022-06-16 RX ORDER — ACETAMINOPHEN 325 MG/1
1000 TABLET ORAL ONCE
Status: CANCELLED | OUTPATIENT
Start: 2022-06-16

## 2022-06-16 RX ORDER — PALONOSETRON 0.05 MG/ML
0.25 INJECTION, SOLUTION INTRAVENOUS ONCE
Status: COMPLETED | OUTPATIENT
Start: 2022-06-16 | End: 2022-06-16

## 2022-06-16 RX ORDER — HEPARIN SODIUM (PORCINE) LOCK FLUSH IV SOLN 100 UNIT/ML 100 UNIT/ML
500 SOLUTION INTRAVENOUS PRN
Status: CANCELLED | OUTPATIENT
Start: 2022-06-16

## 2022-06-16 RX ORDER — DIPHENHYDRAMINE HYDROCHLORIDE 50 MG/ML
25 INJECTION INTRAMUSCULAR; INTRAVENOUS ONCE
Status: COMPLETED | OUTPATIENT
Start: 2022-06-16 | End: 2022-06-16

## 2022-06-16 RX ORDER — DIPHENHYDRAMINE HYDROCHLORIDE 50 MG/ML
50 INJECTION INTRAMUSCULAR; INTRAVENOUS
Status: CANCELLED | OUTPATIENT
Start: 2022-06-16

## 2022-06-16 RX ORDER — SODIUM CHLORIDE 9 MG/ML
20 INJECTION, SOLUTION INTRAVENOUS ONCE
Status: COMPLETED | OUTPATIENT
Start: 2022-06-16 | End: 2022-06-17

## 2022-06-16 RX ORDER — ALBUTEROL SULFATE 90 UG/1
4 AEROSOL, METERED RESPIRATORY (INHALATION) PRN
Status: CANCELLED | OUTPATIENT
Start: 2022-06-16

## 2022-06-16 RX ORDER — PALONOSETRON 0.05 MG/ML
0.25 INJECTION, SOLUTION INTRAVENOUS ONCE
Status: CANCELLED | OUTPATIENT
Start: 2022-06-16 | End: 2022-06-16

## 2022-06-16 RX ORDER — SODIUM CHLORIDE 9 MG/ML
5-40 INJECTION INTRAVENOUS PRN
Status: CANCELLED | OUTPATIENT
Start: 2022-06-16

## 2022-06-16 RX ORDER — SODIUM CHLORIDE 0.9 % (FLUSH) 0.9 %
5-40 SYRINGE (ML) INJECTION PRN
Status: DISCONTINUED | OUTPATIENT
Start: 2022-06-16 | End: 2022-06-17 | Stop reason: HOSPADM

## 2022-06-16 RX ORDER — MEPERIDINE HYDROCHLORIDE 50 MG/ML
12.5 INJECTION INTRAMUSCULAR; INTRAVENOUS; SUBCUTANEOUS PRN
Status: CANCELLED | OUTPATIENT
Start: 2022-06-16

## 2022-06-16 RX ORDER — ONDANSETRON 2 MG/ML
8 INJECTION INTRAMUSCULAR; INTRAVENOUS
Status: CANCELLED | OUTPATIENT
Start: 2022-06-16

## 2022-06-16 RX ORDER — SODIUM CHLORIDE 9 MG/ML
INJECTION, SOLUTION INTRAVENOUS CONTINUOUS
Status: CANCELLED | OUTPATIENT
Start: 2022-06-16

## 2022-06-16 RX ADMIN — ACETAMINOPHEN 1000 MG: 500 TABLET ORAL at 09:50

## 2022-06-16 RX ADMIN — PEGFILGRASTIM 6 MG: KIT SUBCUTANEOUS at 14:22

## 2022-06-16 RX ADMIN — SODIUM CHLORIDE, PRESERVATIVE FREE 10 ML: 5 INJECTION INTRAVENOUS at 14:22

## 2022-06-16 RX ADMIN — FOSAPREPITANT 150 MG: 150 INJECTION, POWDER, LYOPHILIZED, FOR SOLUTION INTRAVENOUS at 09:57

## 2022-06-16 RX ADMIN — CYCLOPHOSPHAMIDE 1380 MG: 200 INJECTION, SOLUTION INTRAVENOUS at 12:22

## 2022-06-16 RX ADMIN — DEXAMETHASONE SODIUM PHOSPHATE 10 MG: 10 INJECTION, SOLUTION INTRAMUSCULAR; INTRAVENOUS at 09:51

## 2022-06-16 RX ADMIN — VINCRISTINE SULFATE 2 MG: 1 INJECTION, SOLUTION INTRAVENOUS at 14:04

## 2022-06-16 RX ADMIN — SODIUM CHLORIDE 20 ML/HR: 9 INJECTION, SOLUTION INTRAVENOUS at 09:49

## 2022-06-16 RX ADMIN — SODIUM CHLORIDE 92 MG: 9 INJECTION, SOLUTION INTRAVENOUS at 12:56

## 2022-06-16 RX ADMIN — SODIUM CHLORIDE 690 MG: 0.9 INJECTION, SOLUTION INTRAVENOUS at 10:36

## 2022-06-16 RX ADMIN — HEPARIN 500 UNITS: 100 SYRINGE at 14:22

## 2022-06-16 RX ADMIN — PALONOSETRON 0.25 MG: 0.05 INJECTION, SOLUTION INTRAVENOUS at 09:51

## 2022-06-16 RX ADMIN — DIPHENHYDRAMINE HYDROCHLORIDE 25 MG: 50 INJECTION, SOLUTION INTRAMUSCULAR; INTRAVENOUS at 09:51

## 2022-06-16 ASSESSMENT — PAIN SCALES - GENERAL: PAINLEVEL_OUTOF10: 0

## 2022-06-27 RX ORDER — AMIODARONE HYDROCHLORIDE 200 MG/1
200 TABLET ORAL DAILY
Qty: 90 TABLET | Refills: 1 | Status: SHIPPED | OUTPATIENT
Start: 2022-06-27 | End: 2022-07-19 | Stop reason: SINTOL

## 2022-07-06 NOTE — PROGRESS NOTES
MEDICAL ONCOLOGY PROGRESS NOTE    Pt Name: Johnny Kang  MRN: [id number]  YOB: 1950  Date of evaluation: 7/7/2022  History Obtained From:  patient, electronic medical record    HISTORY OF PRESENT ILLNESS:  The patient has a diagnosis of diffuse large B-cell lymphoma, ABC phenotype. Patient is current on treatment with R-CHOP. Treatment started 4/8/2022. The patient has received 4 cycles of treatment. He is on Xarelto for a prior history of A. fib and also aspirin. Denies any bleeding. I noticed that his LFTs were 3-4 times elevated today. The patient has been started on amiodarone in the hospital.  I messaged cardiology about this. The patient is also going to contact them. Diagnosis  · Diffuse large B-cell lymphoma, March 2022  · c-Myc, BCL6 and BCL2 rearrangement pending  · Stage IIIB    Treatment summary  · 4/8/2022- Initiated R-CHOP + GCSF every 3 weeks x 6 cycles    Hematology history  Nile Garcia was first seen by me on 3/24/2022. The patient was referred by his primary care provider for findings of generalized lymphadenopathy. The patient has had symptoms of weight loss, night sweats and low-grade fever. This seems has been going on for many months now. Patient had Covid 19 infection last year. He had CT scans that showed generalized adenopathy. · 3/22/2022-CT soft tissue neck showed findings of cervical adenopathy identified, including a right level 3 node measuring 1.6 cm in greatest axial diameter (series 4-image 95). There is a left level 5 lymph node measuring 1.7 cm on axial image 102. Multiple enlarged left level 4 nodes, including a 2.3 cm node on axial image 114. Adenopathy extends down into the left supraclavicular fossa and there is bulky adenopathy also appreciated in the upper mediastinum. · 3/22/2022-CT chest with contrast showed findings of mediastinal adenopathy. Subcarinal lymph nodes are present. Right hilar lymph nodes are present.  Left para-aortic adenopathy is present in the abdomen. Concern for lymphoma. · 3/22/2002-CT abdomen/pelvis showed spleen is enlarged measuring 14 cm craniocaudal dimension. Bilateral renal cysts including dominant 5.4 cm RIGHT upper pole exophytic renal cyst. 3 mm nonobstructing LEFT lower pole renal calculus. Prostate is mildly enlarged measuring 4.8 cm transverse dimension. Bulky retroperitoneal lymphadenopathy is noted. Noted conglomerate in the LEFT periaortic region on image 34 series 4 measures 6.0 x 3.8 cm. Enlarged gastrohepatic ligament lymph nodes with reference node on image 23 measuring 14 mm short axis dimension. No enlarged pelvic or inguinal lymph nodes identified. Bilateral chronic L5 pars defects with grade 1 anterolisthesis of L5 on S1. No acute osseous finding. · 3/24/2022- (H), hemoglobin 10.1/MCV 93, platelets 774,309, WBC 8.7  · 3/24/2022-she was first seen by me. Recommended excisional biopsy left supraclavicular lymph node. · 3/24/2022  B2M 2.8, Uric Acid 3.6,   · 3/28/2022 Left Cervical Lymphadenopathy (BHP): The dominant nodes are seen in the left level 4  and left level 5 neck as well as in the left supraclavicular fossa and upper mediastinum. Degenerative spinal stenosis. Atheromatous calcification in the carotid bulbs. · 3/28/2022-excisional anibal biopsy. Final pathology pending. Preliminary results suggestive of high-grade lymphoma. Recommend allopurinol 300 mg p.o. daily. Recommend a PET scan and bone marrow biopsy. · 4/4/2022-IHC studies from excisional node biopsy consistent with diffuse large B-cell lymphoma, ABC phenotype. BCL2, BCL6 and c-Myc rearrangement in process. Recommended R-CHOP x6 cycles. Started on allopurinol. · 4/6/22 Bone marrow ([facility]): BONE MARROW: Normocellular (30-35% cellular) marrow with multilineage hematopoiesis. Negative for lymphoid infiltrates. Negative for dysplasia, no increase in blasts. Storage iron present, no ring sideroblasts.  FLOW CYTOMETRY: No B-cell monoclonality and no T-cell aberrant antigenic expression. The blasts are not increa we need to sed. CYTOGENETICS: Normal male karyotype. · 4/7/22 PET CT SKULL BASE TO MID THIGH  Extensive lymphadenopathy consistent with lymphoma. Many of these nodes are extremely metabolically active including SUV measurements of greater than 40-50 in some of the nodes. There is extensive adenopathy in the lower cervical chains including level 3, 4 and 5 nodes. Supraclavicular lymphadenopathy is present with extension into the superior mediastinum. Both anterior and middle mediastinal adenopathy as well as right hilar adenopathy is noted within the chest. A right retrocrural node, gastrohepatic ligament nodes and bulky left para-aortic nodes are also present all demonstrating intense abnormal metabolic activity. · 4/8/2022- Initiated R-CHOP + GCSF every 3 weeks x 6 cycles  · 5/12/2022 2D Echocardiogram Left ventricle normal chamber size and thickness  Preserved systolic function with estimated ejection fraction of 54%  However, GLS is slightly decreased especially the apex. This is more sensitive to predict chemotherapy induced cardiomyopathy then ejection  fraction  No regional wall motion abnormality Mitral annulus calcification with moderate degree of mitral leaflet  thickening and normal mobility  Bicuspid aortic valve with gradient of 10 mm. Judging from stroke-volume  index which is 27.7, which is reduced. This may represent low gradient, normal ejection fraction, low stroke-volume significant aortic stenosis. If patient is symptomatic, please refer to cardiology for ARIADNA  · 5/16/2022 Echo ARIADNA LVEF 55%. Mildly thickened aortic valve leaflets with preserved leaflet mobility. Moderate TR. · 6/10/2022 CT Chest WO Contrast Chronic fibrosing interstitial lung disease - fibrotic NSIP versus probable UIP. Etiology could be drug induced.  Few subcentimeter sized mediastinal lymph nodes are seen at bilateral upper paratracheal, bilateral lower paratracheal and subcarinal location. Multiple small liver cyst.Cholelithiasis without cholecystitis. Right renal simple cyst.Degenerative changes in the spine. Multiple subcentimeter bilateral nonobstructive calyceal calcifications. Recommendation:Follow up as clinically indicated. · 6/10/2022 CT Abd/Pelvis WO Contrast There is decrease in the size of retroperitoneal lymphadenopathy compared to prior study, suggestive of response to treatment. Cholelithiasis without evidence of cholecystitis. Multiple simple liver cysts. Recommendation: Follow up as clinically indicated. ·  6/10/2022 CT Soft Tissue Neck WO Contrast Few enlarge nodes are noted in left level IV. Few sub cm sized noted in level Ia and bilateral level Ib. Recommendation:Follow up as clinically indicated. · 6/16/2022-completion 3 cycles R-CHOP. CT chest abdomen pelvis and soft tissue neck showed interval response to treatment. · 7/7/2022-cycle #5 R-CHOP with 25% dose reduction of Adriamycin due to elevated LFTs 3-4 times. Normal bilirubin. Low-dose correction was increasing her cyclophosphamide. Messaged cardiology about amiodarone. I believe this is related to him being on amiodarone.     Past Medical History:    Past Medical History:   Diagnosis Date    Cancer [facility])     lymphoma    Chronic neck pain     Herniated disc, cervical     HTN (hypertension)        Past Surgical History:    Past Surgical History:   Procedure Laterality Date    LYMPH NODE BIOPSY      left side of neck    PORT SURGERY N/A 4/8/2022    SINGLE LUMEN PORT PLACEMENT WITH US & FLUORO performed by Reza Zarate MD at [place] [address] History:    Marital status:  Smoking status:Former smoker, Quit more 25 years ago  ETOH status:No   Resides:[place]    Family History:   Family History   Problem Relation Age of Onset    Cancer Mother         lymphoma    Heart Disease Father        Current Hospital Medications:    Current Once Jing Israel MD        palonosetron (ALOXI) injection 0.25 mg  0.25 mg IntraVENous Once Jing Israel MD        dexamethasone (PF) (DECADRON) injection 10 mg  10 mg IntraVENous Once Jing Israel MD        diphenhydrAMINE (BENADRYL) injection 25 mg  25 mg IntraVENous Once Jing Israel MD        acetaminophen (TYLENOL) tablet 1,000 mg  1,000 mg Oral Once Jing Israel MD        pegfilgrastim (NEULASTA) on-body injector 6 mg  6 mg SubCUTAneous Once Jing Israel MD        riTUXimab-pvvr (RUXIENCE) 690 mg in sodium chloride 0.9 % 500 mL chemo IVPB  375 mg/m2 (Treatment Plan Recorded) IntraVENous Once Jing Israel MD        cyclophosphamide (CYTOXAN) 1,380 mg in sodium chloride 0.9 % 250 mL chemo IVPB  750 mg/m2 (Treatment Plan Recorded) IntraVENous Once Jing Israel MD        DOXOrubicin HCl (ADRIAMYCIN) 70 mg in sodium chloride 0.9 % 100 mL chemo IVPB  37.5 mg/m2 (Treatment Plan Recorded) IntraVENous Once Jing Israel MD        vinCRIStine (ONCOVIN) 2 mg in sodium chloride 0.9 % 50 mL chemo IVPB  2 mg IntraVENous Once Jing Israel MD        sodium chloride flush 0.9 % injection 5-40 mL  5-40 mL IntraVENous PRN Jing Israel MD        heparin flush 100 UNIT/ML injection 500 Units  500 Units IntraCATHeter PRN Jing Israel MD           Allergies: No Known Allergies      Subjective    REVIEW OF SYSTEMS:   CONSTITUTIONAL: no fever, no night sweats, fatigue; hair loss  HEENT: no blurring of vision, no double vision, no hearing difficulty, no tinnitus, no ulceration, no dysplasia, no epistaxis;   LUNGS: no cough, no hemoptysis, no wheeze,  no shortness of breath;  CARDIOVASCULAR: no palpitation, no chest pain, no shortness of breath;  GI: no abdominal pain, no nausea, no vomiting, no diarrhea, no constipation;  KARINA: no dysuria, no hematuria, no frequency or urgency, no nephrolithiasis;  MUSCULOSKELETAL: no joint pain, no swelling, no stiffness;  ENDOCRINE: no polyuria, no polydipsia, no cold or heat intolerance;  HEMATOLOGY: no easy bruising or bleeding, no history of clotting disorder;  DERMATOLOGY: no skin rash, no eczema, no pruritus;  PSYCHIATRY: no depression, no anxiety, no panic attacks, no suicidal ideation, no homicidal ideation;  NEUROLOGY: no syncope, no seizures, no numbness or tingling of hands, no numbness or tingling of feet, no paresis;       Objective   BP (!) 178/85   Pulse 87   Temp 97.7 °F (36.5 °C)   Resp 18   Ht 5' 7\" (1.702 m)   Wt 160 lb 14.4 oz (73 kg)   SpO2 97%   BMI 25.20 kg/m²     PHYSICAL EXAM:  CONSTITUTIONAL: Alert, appropriate, no acute distress  EYES: Non icteric, EOM intact, pupils equal round   ENT: Mucus membranes moist, no oral pharyngeal lesions, external inspection of ears and nose are normal.  NECK: Supple, no masses. No palpable thyroid mass  CHEST/LUNGS: CTA bilaterally, normal respiratory effort   CARDIOVASCULAR: RRR, no murmurs. No lower extremity edema  ABDOMEN: soft non-tender, active bowel sounds, no HSM. No palpable masses  EXTREMITIES: warm, full ROM in all 4 extremities, no focal weakness.   SKIN: warm, dry with no rashes or lesions  LYMPH: No cervical, clavicular, axillary, or inguinal lymphadenopathy  NEUROLOGIC: follows commands, non focal     LABORATORY RESULTS REVIEWED/ANALYZED BY ME:  Lab Results   Component Value Date    WBC 4.90 07/07/2022    HGB 12.2 (L) 07/07/2022    HCT 37.2 (L) 07/07/2022    MCV 98.7 (H) 07/07/2022     07/07/2022     Lab Results   Component Value Date    NEUTROABS 3.19 07/07/2022     Lab Results   Component Value Date     07/07/2022    K 4.0 07/07/2022     07/07/2022    CO2 26 07/07/2022    BUN 19 07/07/2022    CREATININE 0.8 07/07/2022    GLUCOSE 110 (H) 07/07/2022    CALCIUM 9.2 07/07/2022    PROT 7.2 07/07/2022    LABALBU 4.3 07/07/2022    BILITOT 0.5 07/07/2022    ALKPHOS 142 (H) 07/07/2022     (H) 07/07/2022  (H) 07/07/2022    LABGLOM >60 07/07/2022    GFRAA >59 05/18/2022    AGRATIO 1.5 09/19/2016    GLOB 2.9 07/07/2022         RADIOLOGY STUDIES REPORT REVIEWED/INTERPRETED BY ME:  None      ASSESSMENT:  #Diffuse large B-cell lymphoma, stage IIIB, ABC phenotype (c-Myc(-), BCL-2 (+), BCL6(-)rearrangement)  -3/28/2022-excisional node biopsy by Dr. Tesha Sanches, [facility].  -Path consistent with ABC phenotype DLBCL: Lymphoma as per Dr. Isom Kayser. -PET scan and bone marrow biopsy-ordered today on 4/4/2022.  -Persistent B symptoms.  - PET scan/bone marrow biopsy on 4/4/2022. (Negative for lymphoma involvement)  Extensive lymphadenopathy consistent with lymphoma. Many of these nodes are extremely metabolically active including SUV measurements of greater than 40-50 in some of the nodes. There is extensive adenopathy in the lower cervical chains including level 3, 4 and 5 nodes. Supraclavicular lymphadenopathy is present with extension into the superior mediastinum. Both anterior and middle mediastinal adenopathy as well as right hilar adenopathy is noted within the chest. A right retrocrural node, gastrohepatic ligament nodes and bulky left para-aortic nodes are also present all demonstrating intense abnormal metabolic activity. .      Recommended:  -R-CHOP x6 cycles  G-CSF support  6/10/2022-CT neck/C/A/P-interval response to treatment after 3 cycles. Proceed cycle #5 R-CHOP with dose reduction of Adriamycin due to LFTs elevation. Normal bilirubin. Treatment related toxicity-fatigue, anemia, abnormal LFTs    Pulmonary interstitial fibrosis-I reviewed the CT scan with the patient. He has complaints of short of breath on exertion. He has findings of interstitial fibrosis. I offered a pulmonology consultation but he wants to defer this at this time. Transaminitis-unknown etiology, may be amiodarone. I believe that chemotherapy is less likely.   The amiodarone is a new medication for him over the last month or 2.  Normal bilirubin.  -Dose correction Adriamycin-decreased by 25%  -Cyclophosphamide- dose reduction is not likely required  -Vincristine- keep the same dose. Normal bilirubin. PLAN:  · RTC with MD in treatment room 3 weeks   · C#5/6 R-CHOP + GCSF today and every 3 weeks  · CBC CMP TSH today  · Repeat PET scan after C# 6  · Continue Allopurinol 300mg daily  · Continue follow-up with [facility]/Dr Mazin Amezquita  · Consider Pulmonology referral in the future when treatments complete  · Message cardiology about amiodarone and possible liver induced toxicity. We will await further recommendation from them. Nate Mcintyre am pre charting  as Medical Assistant for Hong Llanes MD. Electronically signed by Melo Ty MA on 7/7/2022 at 11:37 AM CDT. Ayah Rosales am scribing for Hong Llanes MD. Electronically signed by Bao Sanford RN on 7/7/2022 at 8:35 AM CDT. I, Dr Angelina Gutierrez, personally performed the services described in this documentation as scribed by Bao Sanford RN in my presence and is both accurate and complete. I have seen, examined and reviewed this patient medication list, appropriate labs and imaging studies. I reviewed relevant medical records and others physicians notes. I discussed the plans of care with the patient. I answered all the questions to the patients satisfaction. I have also reviewed the chief complaint (CC) and part of the history (History of Present Illness (HPI), Past Family Social History [facility]), or Review of Systems (ROS) and made changes when appropriated. (Please note that portions of this note were completed with a voice recognition program. Efforts were made to edit the dictations but occasionally words are mis-transcribed. )Electronically signed by Hong Llanes MD on 7/7/2022 at 9:04 AM

## 2022-07-07 ENCOUNTER — HOSPITAL ENCOUNTER (OUTPATIENT)
Dept: INFUSION THERAPY | Age: 72
Discharge: HOME | End: 2022-07-07
Payer: MEDICARE

## 2022-07-07 ENCOUNTER — OFFICE VISIT (OUTPATIENT)
Dept: HEMATOLOGY | Age: 72
End: 2022-07-07
Payer: MEDICARE

## 2022-07-07 VITALS
HEIGHT: 67 IN | HEART RATE: 87 BPM | RESPIRATION RATE: 18 BRPM | BODY MASS INDEX: 25.25 KG/M2 | SYSTOLIC BLOOD PRESSURE: 178 MMHG | TEMPERATURE: 97.7 F | DIASTOLIC BLOOD PRESSURE: 85 MMHG | OXYGEN SATURATION: 97 % | WEIGHT: 160.9 LBS

## 2022-07-07 DIAGNOSIS — T50.905A DRUG-INDUCED HEPATIC TOXICITY: ICD-10-CM

## 2022-07-07 DIAGNOSIS — D64.81 ANTINEOPLASTIC CHEMOTHERAPY INDUCED ANEMIA: ICD-10-CM

## 2022-07-07 DIAGNOSIS — C85.98 LYMPHOMA OF LYMPH NODES OF MULTIPLE SITES (HCC): ICD-10-CM

## 2022-07-07 DIAGNOSIS — Z51.11 CHEMOTHERAPY MANAGEMENT, ENCOUNTER FOR: ICD-10-CM

## 2022-07-07 DIAGNOSIS — C83.30 DIFFUSE LARGE B-CELL LYMPHOMA, UNSPECIFIED BODY REGION (HCC): Primary | ICD-10-CM

## 2022-07-07 DIAGNOSIS — Z51.12 ENCOUNTER FOR ANTINEOPLASTIC IMMUNOTHERAPY: ICD-10-CM

## 2022-07-07 DIAGNOSIS — C85.10 LARGE B-CELL LYMPHOMA (HCC): ICD-10-CM

## 2022-07-07 DIAGNOSIS — Z71.89 CARE PLAN DISCUSSED WITH PATIENT: ICD-10-CM

## 2022-07-07 DIAGNOSIS — K71.6 DRUG-INDUCED HEPATIC TOXICITY: ICD-10-CM

## 2022-07-07 DIAGNOSIS — Z01.818 EXAMINATION PRIOR TO CHEMOTHERAPY: ICD-10-CM

## 2022-07-07 DIAGNOSIS — R53.0 NEOPLASTIC MALIGNANT RELATED FATIGUE: ICD-10-CM

## 2022-07-07 DIAGNOSIS — T45.1X5D ADVERSE EFFECT OF CHEMOTHERAPY, SUBSEQUENT ENCOUNTER: ICD-10-CM

## 2022-07-07 DIAGNOSIS — T45.1X5A ANTINEOPLASTIC CHEMOTHERAPY INDUCED ANEMIA: ICD-10-CM

## 2022-07-07 LAB
ALBUMIN SERPL-MCNC: 4.3 G/DL (ref 3.5–5.2)
ALP BLD-CCNC: 142 U/L (ref 40–130)
ALT SERPL-CCNC: 274 U/L (ref 21–72)
ANION GAP SERPL CALCULATED.3IONS-SCNC: 12 MMOL/L (ref 7–19)
AST SERPL-CCNC: 131 U/L (ref 17–59)
BILIRUB SERPL-MCNC: 0.5 MG/DL (ref 0.2–1.3)
BUN BLDV-MCNC: 19 MG/DL (ref 9–20)
CALCIUM SERPL-MCNC: 9.2 MG/DL (ref 8.4–10.2)
CHLORIDE BLD-SCNC: 102 MMOL/L (ref 98–111)
CO2: 26 MMOL/L (ref 22–29)
CREAT SERPL-MCNC: 0.8 MG/DL (ref 0.6–1.2)
GFR NON-AFRICAN AMERICAN: >60
GLOBULIN: 2.9 G/DL
GLUCOSE BLD-MCNC: 110 MG/DL (ref 74–106)
HCT VFR BLD CALC: 37.2 % (ref 40.1–51)
HEMOGLOBIN: 12.2 G/DL (ref 13.7–17.5)
LYMPHOCYTES ABSOLUTE: 0.73 K/UL (ref 1.18–3.74)
LYMPHOCYTES RELATIVE PERCENT: 14.9 % (ref 19.3–53.1)
MCH RBC QN AUTO: 32.4 PG (ref 25.7–32.2)
MCHC RBC AUTO-ENTMCNC: 32.8 G/DL (ref 32.3–36.5)
MCV RBC AUTO: 98.7 FL (ref 79–92.2)
MONOCYTES ABSOLUTE: 0.84 K/UL (ref 0.24–0.82)
MONOCYTES RELATIVE PERCENT: 17.1 % (ref 4.7–12.5)
NEUTROPHILS ABSOLUTE: 3.19 K/UL (ref 1.56–6.13)
NEUTROPHILS RELATIVE PERCENT: 65.2 % (ref 34–71.1)
PDW BLD-RTO: 18.6 % (ref 11.6–14.4)
PLATELET # BLD: 226 K/UL (ref 163–337)
PMV BLD AUTO: 9.7 FL (ref 7.4–10.4)
POTASSIUM SERPL-SCNC: 4 MMOL/L (ref 3.5–5.1)
RBC # BLD: 3.77 M/UL (ref 4.63–6.08)
SODIUM BLD-SCNC: 140 MMOL/L (ref 137–145)
TOTAL PROTEIN: 7.2 G/DL (ref 6.3–8.2)
TSH SERPL DL<=0.05 MIU/L-ACNC: 2.66 UIU/ML (ref 0.27–4.2)
WBC # BLD: 4.9 K/UL (ref 4.23–9.07)

## 2022-07-07 PROCEDURE — 80053 COMPREHEN METABOLIC PANEL: CPT

## 2022-07-07 PROCEDURE — 96367 TX/PROPH/DG ADDL SEQ IV INF: CPT

## 2022-07-07 PROCEDURE — 99214 OFFICE O/P EST MOD 30 MIN: CPT | Performed by: INTERNAL MEDICINE

## 2022-07-07 PROCEDURE — 96377 APPLICATON ON-BODY INJECTOR: CPT

## 2022-07-07 PROCEDURE — 96415 CHEMO IV INFUSION ADDL HR: CPT

## 2022-07-07 PROCEDURE — 85025 COMPLETE CBC W/AUTO DIFF WBC: CPT

## 2022-07-07 PROCEDURE — 96417 CHEMO IV INFUS EACH ADDL SEQ: CPT

## 2022-07-07 PROCEDURE — G8417 CALC BMI ABV UP PARAM F/U: HCPCS | Performed by: INTERNAL MEDICINE

## 2022-07-07 PROCEDURE — 1036F TOBACCO NON-USER: CPT | Performed by: INTERNAL MEDICINE

## 2022-07-07 PROCEDURE — 6360000002 HC RX W HCPCS: Performed by: INTERNAL MEDICINE

## 2022-07-07 PROCEDURE — 1123F ACP DISCUSS/DSCN MKR DOCD: CPT | Performed by: INTERNAL MEDICINE

## 2022-07-07 PROCEDURE — 96411 CHEMO IV PUSH ADDL DRUG: CPT

## 2022-07-07 PROCEDURE — 96413 CHEMO IV INFUSION 1 HR: CPT

## 2022-07-07 PROCEDURE — 2580000003 HC RX 258: Performed by: INTERNAL MEDICINE

## 2022-07-07 PROCEDURE — G8428 CUR MEDS NOT DOCUMENT: HCPCS | Performed by: INTERNAL MEDICINE

## 2022-07-07 PROCEDURE — 96375 TX/PRO/DX INJ NEW DRUG ADDON: CPT

## 2022-07-07 PROCEDURE — 3017F COLORECTAL CA SCREEN DOC REV: CPT | Performed by: INTERNAL MEDICINE

## 2022-07-07 PROCEDURE — 6370000000 HC RX 637 (ALT 250 FOR IP): Performed by: INTERNAL MEDICINE

## 2022-07-07 RX ORDER — ONDANSETRON 2 MG/ML
8 INJECTION INTRAMUSCULAR; INTRAVENOUS
Status: CANCELLED | OUTPATIENT
Start: 2022-07-07

## 2022-07-07 RX ORDER — MEPERIDINE HYDROCHLORIDE 25 MG/ML
12.5 INJECTION INTRAMUSCULAR; INTRAVENOUS; SUBCUTANEOUS PRN
Status: CANCELLED | OUTPATIENT
Start: 2022-07-07

## 2022-07-07 RX ORDER — ALBUTEROL SULFATE 90 UG/1
4 AEROSOL, METERED RESPIRATORY (INHALATION) PRN
Status: CANCELLED | OUTPATIENT
Start: 2022-07-07

## 2022-07-07 RX ORDER — DEXAMETHASONE SODIUM PHOSPHATE 10 MG/ML
10 INJECTION, SOLUTION INTRAMUSCULAR; INTRAVENOUS ONCE
Status: CANCELLED | OUTPATIENT
Start: 2022-07-07 | End: 2022-07-07

## 2022-07-07 RX ORDER — SODIUM CHLORIDE 9 MG/ML
INJECTION, SOLUTION INTRAVENOUS CONTINUOUS
Status: CANCELLED | OUTPATIENT
Start: 2022-07-07

## 2022-07-07 RX ORDER — ACETAMINOPHEN 500 MG
1000 TABLET ORAL ONCE
Status: COMPLETED | OUTPATIENT
Start: 2022-07-07 | End: 2022-07-07

## 2022-07-07 RX ORDER — PALONOSETRON 0.05 MG/ML
0.25 INJECTION, SOLUTION INTRAVENOUS ONCE
Status: COMPLETED | OUTPATIENT
Start: 2022-07-07 | End: 2022-07-07

## 2022-07-07 RX ORDER — DIPHENHYDRAMINE HYDROCHLORIDE 50 MG/ML
25 INJECTION INTRAMUSCULAR; INTRAVENOUS ONCE
Status: COMPLETED | OUTPATIENT
Start: 2022-07-07 | End: 2022-07-07

## 2022-07-07 RX ORDER — SODIUM CHLORIDE 9 MG/ML
20 INJECTION, SOLUTION INTRAVENOUS ONCE
Status: CANCELLED | OUTPATIENT
Start: 2022-07-07 | End: 2022-07-07

## 2022-07-07 RX ORDER — SODIUM CHLORIDE 0.9 % (FLUSH) 0.9 %
5-40 SYRINGE (ML) INJECTION PRN
Status: CANCELLED | OUTPATIENT
Start: 2022-07-07

## 2022-07-07 RX ORDER — SODIUM CHLORIDE 0.9 % (FLUSH) 0.9 %
5-40 SYRINGE (ML) INJECTION PRN
Status: DISCONTINUED | OUTPATIENT
Start: 2022-07-07 | End: 2022-07-08 | Stop reason: HOSPADM

## 2022-07-07 RX ORDER — DEXAMETHASONE SODIUM PHOSPHATE 10 MG/ML
10 INJECTION, SOLUTION INTRAMUSCULAR; INTRAVENOUS ONCE
Status: COMPLETED | OUTPATIENT
Start: 2022-07-07 | End: 2022-07-07

## 2022-07-07 RX ORDER — PALONOSETRON 0.05 MG/ML
0.25 INJECTION, SOLUTION INTRAVENOUS ONCE
Status: CANCELLED | OUTPATIENT
Start: 2022-07-07 | End: 2022-07-07

## 2022-07-07 RX ORDER — DIPHENHYDRAMINE HYDROCHLORIDE 50 MG/ML
25 INJECTION INTRAMUSCULAR; INTRAVENOUS ONCE
Status: CANCELLED | OUTPATIENT
Start: 2022-07-07

## 2022-07-07 RX ORDER — HEPARIN SODIUM (PORCINE) LOCK FLUSH IV SOLN 100 UNIT/ML 100 UNIT/ML
500 SOLUTION INTRAVENOUS PRN
Status: DISCONTINUED | OUTPATIENT
Start: 2022-07-07 | End: 2022-07-08 | Stop reason: HOSPADM

## 2022-07-07 RX ORDER — ACETAMINOPHEN 325 MG/1
650 TABLET ORAL
Status: CANCELLED | OUTPATIENT
Start: 2022-07-07

## 2022-07-07 RX ORDER — ACETAMINOPHEN 500 MG
1000 TABLET ORAL ONCE
Status: CANCELLED | OUTPATIENT
Start: 2022-07-07

## 2022-07-07 RX ORDER — SODIUM CHLORIDE 9 MG/ML
25 INJECTION, SOLUTION INTRAVENOUS PRN
Status: CANCELLED | OUTPATIENT
Start: 2022-07-07

## 2022-07-07 RX ORDER — DIPHENHYDRAMINE HYDROCHLORIDE 50 MG/ML
50 INJECTION INTRAMUSCULAR; INTRAVENOUS
Status: CANCELLED | OUTPATIENT
Start: 2022-07-07

## 2022-07-07 RX ORDER — EPINEPHRINE 1 MG/ML
0.3 INJECTION, SOLUTION, CONCENTRATE INTRAVENOUS PRN
Status: CANCELLED | OUTPATIENT
Start: 2022-07-07

## 2022-07-07 RX ORDER — SODIUM CHLORIDE 9 MG/ML
5-40 INJECTION INTRAVENOUS PRN
Status: CANCELLED | OUTPATIENT
Start: 2022-07-07

## 2022-07-07 RX ORDER — SODIUM CHLORIDE 9 MG/ML
20 INJECTION, SOLUTION INTRAVENOUS ONCE
Status: COMPLETED | OUTPATIENT
Start: 2022-07-07 | End: 2022-07-07

## 2022-07-07 RX ORDER — HEPARIN SODIUM (PORCINE) LOCK FLUSH IV SOLN 100 UNIT/ML 100 UNIT/ML
500 SOLUTION INTRAVENOUS PRN
Status: CANCELLED | OUTPATIENT
Start: 2022-07-07

## 2022-07-07 RX ADMIN — SODIUM CHLORIDE 20 ML/HR: 9 INJECTION, SOLUTION INTRAVENOUS at 09:26

## 2022-07-07 RX ADMIN — CYCLOPHOSPHAMIDE 1380 MG: 200 INJECTION, SOLUTION INTRAVENOUS at 11:40

## 2022-07-07 RX ADMIN — DEXAMETHASONE SODIUM PHOSPHATE 10 MG: 10 INJECTION, SOLUTION INTRAMUSCULAR; INTRAVENOUS at 09:18

## 2022-07-07 RX ADMIN — FOSAPREPITANT 150 MG: 150 INJECTION, POWDER, LYOPHILIZED, FOR SOLUTION INTRAVENOUS at 09:25

## 2022-07-07 RX ADMIN — HEPARIN 500 UNITS: 100 SYRINGE at 13:35

## 2022-07-07 RX ADMIN — ACETAMINOPHEN 1000 MG: 500 TABLET ORAL at 09:18

## 2022-07-07 RX ADMIN — SODIUM CHLORIDE 690 MG: 9 INJECTION, SOLUTION INTRAVENOUS at 10:01

## 2022-07-07 RX ADMIN — SODIUM CHLORIDE 70 MG: 9 INJECTION, SOLUTION INTRAVENOUS at 12:15

## 2022-07-07 RX ADMIN — PALONOSETRON 0.25 MG: 0.05 INJECTION, SOLUTION INTRAVENOUS at 09:18

## 2022-07-07 RX ADMIN — PEGFILGRASTIM 6 MG: KIT SUBCUTANEOUS at 13:20

## 2022-07-07 RX ADMIN — VINCRISTINE SULFATE 2 MG: 1 INJECTION, SOLUTION INTRAVENOUS at 13:18

## 2022-07-07 RX ADMIN — DIPHENHYDRAMINE HYDROCHLORIDE 25 MG: 50 INJECTION, SOLUTION INTRAMUSCULAR; INTRAVENOUS at 09:18

## 2022-07-07 RX ADMIN — SODIUM CHLORIDE, PRESERVATIVE FREE 10 ML: 5 INJECTION INTRAVENOUS at 13:35

## 2022-07-08 ENCOUNTER — PATIENT MESSAGE (OUTPATIENT)
Dept: CARDIOLOGY CLINIC | Age: 72
End: 2022-07-08

## 2022-07-08 NOTE — TELEPHONE ENCOUNTER
From: Memo Sawyer  To: Dr. Funmi Hernandez: 7/8/2022 9:23 AM CDT  Subject: Amiodarone    Dr. Rambo Ventura asked me to contact you about the Amiodarone because my liver enzymes were off. I really want to defeat the cancer without adding more problems. I monitor my pulse daily and even after heavy exertion it falls back in place.      Anusha Lopez

## 2022-07-11 ENCOUNTER — TELEPHONE (OUTPATIENT)
Dept: CARDIOLOGY CLINIC | Age: 72
End: 2022-07-11

## 2022-07-12 NOTE — TELEPHONE ENCOUNTER
Per Dr. Sandeep Rdz discussed with the Pt to d/c his amio and to schedule an lani to see Dr. Sandeep Rdz in the office to discuss sotalol. PT voiced his understanding and stated that he stopped his amiodarone on 7/8/22. Appt made for 7/19/22 at 9:15am. Pt voiced understanding.

## 2022-07-18 ENCOUNTER — TELEPHONE (OUTPATIENT)
Dept: CARDIOLOGY CLINIC | Age: 72
End: 2022-07-18

## 2022-07-19 ENCOUNTER — OFFICE VISIT (OUTPATIENT)
Dept: CARDIOLOGY CLINIC | Age: 72
End: 2022-07-19
Payer: MEDICARE

## 2022-07-19 VITALS
HEART RATE: 88 BPM | DIASTOLIC BLOOD PRESSURE: 72 MMHG | HEIGHT: 67 IN | WEIGHT: 158 LBS | OXYGEN SATURATION: 98 % | BODY MASS INDEX: 24.8 KG/M2 | SYSTOLIC BLOOD PRESSURE: 130 MMHG

## 2022-07-19 DIAGNOSIS — I48.92 ATRIAL FLUTTER WITH RAPID VENTRICULAR RESPONSE (HCC): Primary | ICD-10-CM

## 2022-07-19 PROCEDURE — 1123F ACP DISCUSS/DSCN MKR DOCD: CPT | Performed by: INTERNAL MEDICINE

## 2022-07-19 PROCEDURE — G8427 DOCREV CUR MEDS BY ELIG CLIN: HCPCS | Performed by: INTERNAL MEDICINE

## 2022-07-19 PROCEDURE — 3017F COLORECTAL CA SCREEN DOC REV: CPT | Performed by: INTERNAL MEDICINE

## 2022-07-19 PROCEDURE — 1036F TOBACCO NON-USER: CPT | Performed by: INTERNAL MEDICINE

## 2022-07-19 PROCEDURE — 99213 OFFICE O/P EST LOW 20 MIN: CPT | Performed by: INTERNAL MEDICINE

## 2022-07-19 PROCEDURE — 93000 ELECTROCARDIOGRAM COMPLETE: CPT | Performed by: INTERNAL MEDICINE

## 2022-07-19 PROCEDURE — G8420 CALC BMI NORM PARAMETERS: HCPCS | Performed by: INTERNAL MEDICINE

## 2022-07-19 NOTE — PROGRESS NOTES
Resource Strain: Not on file   Food Insecurity: Not on file   Transportation Needs: Not on file   Physical Activity: Not on file   Stress: Not on file   Social Connections: Not on file   Intimate Partner Violence: Not on file   Housing Stability: Not on file       No Known Allergies      Current Outpatient Medications:     dilTIAZem (CARDIZEM CD) 120 MG extended release capsule, Take 1 capsule by mouth daily, Disp: 30 capsule, Rfl: 5    rivaroxaban (XARELTO) 20 MG TABS tablet, Take 1 tablet by mouth daily (with breakfast), Disp: 30 tablet, Rfl: 5    predniSONE (DELTASONE) 50 MG tablet, TAKE 2 TABLETS BY MOUTH ONCE DAILY FOR 5 DAYS, Disp: , Rfl:     lidocaine-prilocaine (EMLA) 2.5-2.5 % cream, Apply topically to port area and cover with plastic wrap one hour prior to treatment. , Disp: 1 each, Rfl: 1    ondansetron (ZOFRAN) 4 MG tablet, Take 1 tablet by mouth every 8 hours as needed for Nausea or Vomiting, Disp: 30 tablet, Rfl: 1    allopurinol (ZYLOPRIM) 300 MG tablet, Take 1 tablet by mouth daily, Disp: 30 tablet, Rfl: 5    turmeric 500 MG CAPS, Take by mouth in the morning, at noon, and at bedtime , Disp: , Rfl:     zinc gluconate 50 MG tablet, Take 50 mg by mouth daily, Disp: , Rfl:     ferrous sulfate (IRON 325) 325 (65 Fe) MG tablet, Take 325 mg by mouth daily (with breakfast), Disp: , Rfl:     KRILL OIL PO, Take by mouth, Disp: , Rfl:     Red Yeast Rice Extract (RED YEAST RICE PO), Take by mouth, Disp: , Rfl:     Probiotic Product (PROBIOTIC-10 PO), Take by mouth, Disp: , Rfl:     Zn-Pyg Afri-Nettle-Saw Palmet (SAW PALMETTO COMPLEX PO), Take by mouth, Disp: , Rfl:     Multiple Vitamins-Minerals (THERAPEUTIC MULTIVITAMIN-MINERALS) tablet, Take 1 tablet by mouth daily, Disp: , Rfl:     Vitamin D (CHOLECALCIFEROL) 1000 UNITS CAPS capsule, Take 1,000 Units by mouth daily , Disp: , Rfl:     niacin 500 MG CR capsule, Take 500 mg by mouth nightly , Disp: , Rfl:     aspirin 81 MG tablet, Take 81 mg by mouth daily, Disp: , Rfl:     PE:  Vitals:    07/19/22 0916   BP: 130/72   Pulse: 88   SpO2: 98%   Weight: 158 lb (71.7 kg)   Height: 5' 7\" (1.702 m)       Estimated body mass index is 24.75 kg/m² as calculated from the following:    Height as of this encounter: 5' 7\" (1.702 m). Weight as of this encounter: 158 lb (71.7 kg). Constitutional: He is oriented to person, place, and time. He appears well-developed and well-nourished in no acute distress. Neck:  Neck supple without JVD present. No trachea deviation present. No thyromegaly present. Eyes:Conjunctivae and EOM are normal. Pupils equal and reactive to light. ENT:Hearing appears normal.conjunctiva and lids are normal, ears and nose appear normal.  Cardiovascular: Normal rate, S1-S2 regular rhythm, normal heart sounds. 2 out of 6 holosystolic murmur ascultated. No gallop and no friction rub. No carotid bruits. No peripheral edema. Pulmonary/Chest:  Lungs clear to auscultation bilaterally without evidence of respiratory distress. He without wheezes. He without rales or ronchi. Musculoskeletal: Normal range of motion. Gait is normal  Head is normocephalic and atraumatic. Skin: Skin is warm and dry without rash or pallor. Psychiatric:He is alert and oriented to person, place, and time. He has a normal mood and affect. His behavior is normal. Thought content normal.       Lab Results   Component Value Date/Time    CREATININE 0.8 07/07/2022 08:15 AM    CREATININE 0.8 06/16/2022 09:07 AM    CREATININE 0.8 05/26/2022 08:18 AM    HGB 12.2 07/07/2022 08:35 AM    HGB 11.9 06/16/2022 09:08 AM    HGB 11.6 05/26/2022 08:27 AM    PROBNP 598 05/16/2022 05:05 PM           ECG 07/19/22    Sinus rhythm         Assessment, Recommendations, & Plan:  70 y.o. male with atrial fibrillation. His amiodarone was stopped due to elevated liver enzymes and he does not wish to initiate sotalol at this time. We will continue the diltiazem and he is protected with rivaroxaban. If A. fib returns and he is symptomatic with that consider addition of sotalol at that time his QT interval is normal        Disposition - RTC in December    Please do not hesitate to contact me for any questions or concerns. Dr. Jaylan Brink  Electrophysiology and Cardiology  [facility] [place], Cardiology  [phone]

## 2022-07-20 NOTE — ED NOTES
Verified with dorcas Flores to increase diltiazem gtt at this time.       Patrice Granados RN  05/16/22 3580 . .

## 2022-07-22 ENCOUNTER — NURSE ONLY (OUTPATIENT)
Dept: PRIMARY CARE CLINIC | Age: 72
End: 2022-07-22

## 2022-07-22 ENCOUNTER — PATIENT MESSAGE (OUTPATIENT)
Dept: PRIMARY CARE CLINIC | Age: 72
End: 2022-07-22

## 2022-07-22 DIAGNOSIS — C85.98 LYMPHOMA OF LYMPH NODES OF MULTIPLE REGIONS, UNSPECIFIED LYMPHOMA TYPE (HCC): Primary | ICD-10-CM

## 2022-07-22 DIAGNOSIS — C85.98 LYMPHOMA OF LYMPH NODES OF MULTIPLE REGIONS, UNSPECIFIED LYMPHOMA TYPE (HCC): ICD-10-CM

## 2022-07-22 LAB
ALBUMIN SERPL-MCNC: 4.1 G/DL (ref 3.5–5.2)
ALP BLD-CCNC: 125 U/L (ref 40–130)
ALT SERPL-CCNC: 13 U/L (ref 5–41)
ANION GAP SERPL CALCULATED.3IONS-SCNC: 13 MMOL/L (ref 7–19)
AST SERPL-CCNC: 20 U/L (ref 5–40)
BILIRUB SERPL-MCNC: 0.3 MG/DL (ref 0.2–1.2)
BUN BLDV-MCNC: 17 MG/DL (ref 8–23)
CALCIUM SERPL-MCNC: 9.2 MG/DL (ref 8.8–10.2)
CHLORIDE BLD-SCNC: 101 MMOL/L (ref 98–111)
CO2: 24 MMOL/L (ref 22–29)
CREAT SERPL-MCNC: 1 MG/DL (ref 0.5–1.2)
GFR AFRICAN AMERICAN: >59
GFR NON-AFRICAN AMERICAN: >60
GLUCOSE BLD-MCNC: 91 MG/DL (ref 74–109)
POTASSIUM SERPL-SCNC: 4.2 MMOL/L (ref 3.5–5)
SODIUM BLD-SCNC: 138 MMOL/L (ref 136–145)
TOTAL PROTEIN: 7 G/DL (ref 6.6–8.7)

## 2022-07-22 NOTE — TELEPHONE ENCOUNTER
From: Braeden Watson  To: Manuel Reyna  Sent: 7/22/2022 9:09 AM CDT  Subject: Liver enzymes    Can I get a blood draw for AST and ALT to see if it has improved? My next treatment is Thursday, I would like to know ahead of time, so I don't get all hooked up and can't proceed.     Margareth Kingston

## 2022-07-27 NOTE — PROGRESS NOTES
MEDICAL ONCOLOGY PROGRESS NOTE    Pt Name: Allyson Dean  MRN: [id number]  YOB: 1950  Date of evaluation: 7/28/2022  History Obtained From:  patient, electronic medical record    HISTORY OF PRESENT ILLNESS:  The patient has a diagnosis of diffuse large B-cell lymphoma, ABC phenotype. Patient is current on treatment with R-CHOP. Treatment started 4/8/2022. The patient has received 4 cycles of treatment. He is on Xarelto for a prior history of A. fib and also aspirin. Denies any bleeding. No new complaints. Diagnosis  Diffuse large B-cell lymphoma, March 2022  c-Myc, BCL6 and BCL2 rearrangement pending  Stage IIIB    Treatment summary  4/8/2022- 7/28/22 Completed R-CHOP + GCSF every 3 weeks x 6 cycles    Hematology history  Willow Griffin was first seen by me on 3/24/2022. The patient was referred by his primary care provider for findings of generalized lymphadenopathy. The patient has had symptoms of weight loss, night sweats and low-grade fever. This seems has been going on for many months now. Patient had Covid 19 infection last year. He had CT scans that showed generalized adenopathy. 3/22/2022-CT soft tissue neck showed findings of cervical adenopathy identified, including a right level 3 node measuring 1.6 cm in greatest axial diameter (series 4-image 95). There is a left level 5 lymph node measuring 1.7 cm on axial image 102. Multiple enlarged left level 4 nodes, including a 2.3 cm node on axial image 114. Adenopathy extends down into the left supraclavicular fossa and there is bulky adenopathy also appreciated in the upper mediastinum. 3/22/2022-CT chest with contrast showed findings of mediastinal adenopathy. Subcarinal lymph nodes are present. Right hilar lymph nodes are present. Left para-aortic adenopathy is present in the abdomen. Concern for lymphoma. 3/22/2002-CT abdomen/pelvis showed spleen is enlarged measuring 14 cm craniocaudal dimension.  Bilateral renal cysts including dominant 5.4 cm RIGHT upper pole exophytic renal cyst. 3 mm nonobstructing LEFT lower pole renal calculus. Prostate is mildly enlarged measuring 4.8 cm transverse dimension. Bulky retroperitoneal lymphadenopathy is noted. Noted conglomerate in the LEFT periaortic region on image 34 series 4 measures 6.0 x 3.8 cm. Enlarged gastrohepatic ligament lymph nodes with reference node on image 23 measuring 14 mm short axis dimension. No enlarged pelvic or inguinal lymph nodes identified. Bilateral chronic L5 pars defects with grade 1 anterolisthesis of L5 on S1. No acute osseous finding. 3/24/2022- (H), hemoglobin 10.1/MCV 93, platelets 287,074, WBC 8.7  3/24/2022-she was first seen by me. Recommended excisional biopsy left supraclavicular lymph node. 3/24/2022  B2M 2.8, Uric Acid 3.6,   3/28/2022 Left Cervical Lymphadenopathy (BHP): The dominant nodes are seen in the left level 4  and left level 5 neck as well as in the left supraclavicular fossa and upper mediastinum. Degenerative spinal stenosis. Atheromatous calcification in the carotid bulbs. 3/28/2022-excisional anibal biopsy. Final pathology pending. Preliminary results suggestive of high-grade lymphoma. Recommend allopurinol 300 mg p.o. daily. Recommend a PET scan and bone marrow biopsy. 4/4/2022-IHC studies from excisional node biopsy consistent with diffuse large B-cell lymphoma, ABC phenotype. BCL2, BCL6 and c-Myc rearrangement in process. Recommended R-CHOP x6 cycles. Started on allopurinol. 4/6/22 Bone marrow ([facility]): BONE MARROW: Normocellular (30-35% cellular) marrow with multilineage hematopoiesis. Negative for lymphoid infiltrates. Negative for dysplasia, no increase in blasts. Storage iron present, no ring sideroblasts. FLOW CYTOMETRY: No B-cell monoclonality and no T-cell aberrant antigenic expression. The blasts are not increa we need to sed. CYTOGENETICS: Normal male karyotype.    4/7/22 PET CT SKULL BASE TO MID THIGH Extensive lymphadenopathy consistent with lymphoma. Many of these nodes are extremely metabolically active including SUV measurements of greater than 40-50 in some of the nodes. There is extensive adenopathy in the lower cervical chains including level 3, 4 and 5 nodes. Supraclavicular lymphadenopathy is present with extension into the superior mediastinum. Both anterior and middle mediastinal adenopathy as well as right hilar adenopathy is noted within the chest. A right retrocrural node, gastrohepatic ligament nodes and bulky left para-aortic nodes are also present all demonstrating intense abnormal metabolic activity. 4/8/2022- Initiated R-CHOP + GCSF every 3 weeks x 6 cycles  5/12/2022 2D Echocardiogram Left ventricle normal chamber size and thickness  Preserved systolic function with estimated ejection fraction of 54%  However, GLS is slightly decreased especially the apex. This is more sensitive to predict chemotherapy induced cardiomyopathy then ejection  fraction  No regional wall motion abnormality Mitral annulus calcification with moderate degree of mitral leaflet  thickening and normal mobility  Bicuspid aortic valve with gradient of 10 mm. Judging from stroke-volume  index which is 27.7, which is reduced. This may represent low gradient, normal ejection fraction, low stroke-volume significant aortic stenosis. If patient is symptomatic, please refer to cardiology for ARIADNA  5/16/2022 Echo ARIADNA LVEF 55%. Mildly thickened aortic valve leaflets with preserved leaflet mobility. Moderate TR.  6/10/2022 CT Chest WO Contrast Chronic fibrosing interstitial lung disease - fibrotic NSIP versus probable UIP. Etiology could be drug induced. Few subcentimeter sized mediastinal lymph nodes are seen at bilateral upper paratracheal, bilateral lower paratracheal and subcarinal location. Multiple small liver cyst.Cholelithiasis without cholecystitis. Right renal simple cyst.Degenerative changes in the spine. Multiple subcentimeter bilateral nonobstructive calyceal calcifications. Recommendation:Follow up as clinically indicated. 6/10/2022 CT Abd/Pelvis WO Contrast There is decrease in the size of retroperitoneal lymphadenopathy compared to prior study, suggestive of response to treatment. Cholelithiasis without evidence of cholecystitis. Multiple simple liver cysts. Recommendation: Follow up as clinically indicated. 6/10/2022 CT Soft Tissue Neck WO Contrast Few enlarge nodes are noted in left level IV. Few sub cm sized noted in level Ia and bilateral level Ib. Recommendation:Follow up as clinically indicated. 6/16/2022-completion 3 cycles R-CHOP. CT chest abdomen pelvis and soft tissue neck showed interval response to treatment. 7/7/2022-cycle #5 R-CHOP with 25% dose reduction of Adriamycin due to elevated LFTs 3-4 times. Normal bilirubin. Low-dose correction was increasing her cyclophosphamide. Messaged cardiology about amiodarone. I believe this is related to him being on amiodarone. 4/8/2022- 7/28/22 Completed R-CHOP + GCSF every 3 weeks x 6 cycles.      Past Medical History:    Past Medical History:   Diagnosis Date    Cancer ([facility] [address]     lymphoma    Chronic neck pain     Herniated disc, cervical     HTN (hypertension)        Past Surgical History:    Past Surgical History:   Procedure Laterality Date    LYMPH NODE BIOPSY      left side of neck    PORT SURGERY N/A 4/8/2022    SINGLE LUMEN PORT PLACEMENT WITH US & FLUORO performed by Fernando Barraza MD at [place] [id number] History:    Marital status:  Smoking status:Former smoker, Quit more 25 years ago  ETOH status:No   Resides:[place]    Family History:   Family History   Problem Relation Age of Onset    Cancer Mother         lymphoma    Heart Disease Father        Current Hospital Medications:    Current Outpatient Medications   Medication Sig Dispense Refill    dilTIAZem (CARDIZEM CD) 120 MG extended release capsule Take 1 capsule by mouth daily 30 capsule 5 rivaroxaban (XARELTO) 20 MG TABS tablet Take 1 tablet by mouth daily (with breakfast) 30 tablet 5    predniSONE (DELTASONE) 50 MG tablet TAKE 2 TABLETS BY MOUTH ONCE DAILY FOR 5 DAYS      lidocaine-prilocaine (EMLA) 2.5-2.5 % cream Apply topically to port area and cover with plastic wrap one hour prior to treatment. 1 each 1    ondansetron (ZOFRAN) 4 MG tablet Take 1 tablet by mouth every 8 hours as needed for Nausea or Vomiting 30 tablet 1    allopurinol (ZYLOPRIM) 300 MG tablet Take 1 tablet by mouth daily 30 tablet 5    turmeric 500 MG CAPS Take by mouth in the morning, at noon, and at bedtime       zinc gluconate 50 MG tablet Take 50 mg by mouth daily      ferrous sulfate (IRON 325) 325 (65 Fe) MG tablet Take 325 mg by mouth daily (with breakfast)      KRILL OIL PO Take by mouth      Red Yeast Rice Extract (RED YEAST RICE PO) Take by mouth      Probiotic Product (PROBIOTIC-10 PO) Take by mouth      Zn-Pyg Afri-Nettle-Saw Palmet (SAW PALMETTO COMPLEX PO) Take by mouth      Multiple Vitamins-Minerals (THERAPEUTIC MULTIVITAMIN-MINERALS) tablet Take 1 tablet by mouth daily      Vitamin D (CHOLECALCIFEROL) 1000 UNITS CAPS capsule Take 1,000 Units by mouth daily       niacin 500 MG CR capsule Take 500 mg by mouth nightly       aspirin 81 MG tablet Take 81 mg by mouth daily       No current facility-administered medications for this visit.      Facility-Administered Medications Ordered in Other Visits   Medication Dose Route Frequency Provider Last Rate Last Admin    0.9 % sodium chloride infusion  20 mL/hr IntraVENous Once Adam Perez MD        fosaprepitant (EMEND) 150 mg in sodium chloride 0.9 % 250 mL IVPB  150 mg IntraVENous Once Adam Perez MD        palonosetron (ALOXI) injection 0.25 mg  0.25 mg IntraVENous Once Adam Perez MD        dexamethasone (PF) (DECADRON) injection 10 mg  10 mg IntraVENous Once Adam Perez MD        diphenhydrAMINE (BENADRYL) injection 25 mg  25 mg IntraVENous Once Adam Perez MD        acetaminophen (TYLENOL) tablet 1,000 mg  1,000 mg Oral Once Adam Perez MD        pegfilgrastim (NEULASTA) on-body injector 6 mg  6 mg SubCUTAneous Once Adam Perez MD        riTUXimab-pvvr (RUXIENCE) 690 mg in sodium chloride 0.9 % 500 mL chemo IVPB  375 mg/m2 (Treatment Plan Recorded) IntraVENous Once Adam Perez MD        cyclophosphamide (CYTOXAN) 1,380 mg in sodium chloride 0.9 % 250 mL chemo IVPB  750 mg/m2 (Treatment Plan Recorded) IntraVENous Once Adam Perez MD        DOXOrubicin HCl (ADRIAMYCIN) 92 mg in sodium chloride 0.9 % 100 mL chemo IVPB  50 mg/m2 (Treatment Plan Recorded) IntraVENous Once Adam Perez MD        vinCRIStine (ONCOVIN) 2 mg in sodium chloride 0.9 % 50 mL chemo IVPB  2 mg IntraVENous Once Adam Perez MD        sodium chloride flush 0.9 % injection 5-40 mL  5-40 mL IntraVENous PRN Adam Perez MD        heparin flush 100 UNIT/ML injection 500 Units  500 Units IntraCATHeter PRN Adam Perez MD           Allergies: No Known Allergies      Subjective    REVIEW OF SYSTEMS:   CONSTITUTIONAL: no fever, no night sweats, fatigue;  HEENT: no blurring of vision, no double vision, no hearing difficulty, no tinnitus, no ulceration, no dysplasia, no epistaxis;   LUNGS: no cough, no hemoptysis, no wheeze,  no shortness of breath;  CARDIOVASCULAR: no palpitation, no chest pain, no shortness of breath;  GI: no abdominal pain, no nausea, no vomiting, no diarrhea,  constipation;  KARINA: no dysuria, no hematuria, no frequency or urgency, no nephrolithiasis;  MUSCULOSKELETAL: no joint pain, no swelling, no stiffness;  ENDOCRINE: no polyuria, no polydipsia, no cold or heat intolerance;  HEMATOLOGY: no easy bruising or bleeding, no history of clotting disorder;  DERMATOLOGY: no skin rash, no eczema, no pruritus;  PSYCHIATRY: no depression, no anxiety, no panic attacks, no suicidal ideation, no homicidal ideation;  NEUROLOGY: no syncope, no seizures, no numbness or tingling of hands, no numbness or tingling of feet, no paresis;     Objective   BP (!) 167/82   Pulse 98   Temp 97.7 °F (36.5 °C)   Resp 18   Ht 5' 7\" (1.702 m)   Wt 160 lb 9.6 oz (72.8 kg)   SpO2 97%   BMI 25.15 kg/m²   Wt Readings from Last 3 Encounters:   07/28/22 160 lb 9.6 oz (72.8 kg)   07/19/22 158 lb (71.7 kg)   07/07/22 160 lb 14.4 oz (73 kg)       PHYSICAL EXAM:  CONSTITUTIONAL: Alert, appropriate, no acute distress  EYES: Non icteric, EOM intact, pupils equal round   ENT: Mucus membranes moist, no oral pharyngeal lesions, external inspection of ears and nose are normal.  NECK: Supple, no masses. No palpable thyroid mass  CHEST/LUNGS: CTA bilaterally, normal respiratory effort   CARDIOVASCULAR: RRR, no murmurs. No lower extremity edema  ABDOMEN: soft non-tender, active bowel sounds, no HSM. No palpable masses  EXTREMITIES: warm, full ROM in all 4 extremities, no focal weakness.   SKIN: warm, dry with no rashes or lesions  LYMPH: No cervical, clavicular, axillary, or inguinal lymphadenopathy  NEUROLOGIC: follows commands, non focal     LABORATORY RESULTS REVIEWED/ANALYZED BY ME:  Lab Results   Component Value Date    TSHFT4 1.95 05/16/2022    TSH 2.660 07/07/2022     Lab Results   Component Value Date    WBC 7.08 07/28/2022    HGB 12.1 (L) 07/28/2022    HCT 37.3 (L) 07/28/2022    .1 (H) 07/28/2022     07/28/2022     Lab Results   Component Value Date    NEUTROABS 5.53 07/28/2022     Lab Results   Component Value Date     07/28/2022    K 3.9 07/28/2022     07/28/2022    CO2 28 07/28/2022    BUN 14 07/28/2022    CREATININE 0.8 07/28/2022    GLUCOSE 115 (H) 07/28/2022    CALCIUM 9.4 07/28/2022    PROT 6.8 07/28/2022    LABALBU 4.4 07/28/2022    BILITOT 0.4 07/28/2022    ALKPHOS 84 07/28/2022    AST 24 07/28/2022    ALT 16 (L) 07/28/2022    LABGLOM >60 07/28/2022    GFRAA >59 07/22/2022    AGRATIO 1.5 09/19/2016    GLOB 2.4 07/28/2022       RADIOLOGY STUDIES REPORT REVIEWED/INTERPRETED BY ME:  None      ASSESSMENT:  #Diffuse large B-cell lymphoma, stage IIIB, ABC phenotype (c-Myc(-), BCL-2 (+), BCL6(-)rearrangement)  -3/28/2022-excisional node biopsy by Dr. Sebastian Alford, [facility].  -Path consistent with ABC phenotype DLBCL: Lymphoma as per Dr. Judy Escobedo. -PET scan and bone marrow biopsy-ordered today on 4/4/2022.  -Persistent B symptoms.  - PET scan/bone marrow biopsy on 4/4/2022. (Negative for lymphoma involvement)  Extensive lymphadenopathy consistent with lymphoma. Many of these nodes are extremely metabolically active including SUV measurements of greater than 40-50 in some of the nodes. There is extensive adenopathy in the lower cervical chains including level 3, 4 and 5 nodes. Supraclavicular lymphadenopathy is present with extension into the superior mediastinum. Both anterior and middle mediastinal adenopathy as well as right hilar adenopathy is noted within the chest. A right retrocrural node, gastrohepatic ligament nodes and bulky left para-aortic nodes are also present all demonstrating intense abnormal metabolic activity. .      Recommended:  -R-CHOP x6 cycles  G-CSF support  6/10/2022-CT neck/C/A/P-interval response to treatment after 3 cycles. Proceed cycle #6 R-CHOP with dose reduction of Adriamycin due to LFTs elevation. Normal bilirubin. Treatment related toxicity-fatigue, anemia, abnormal LFTs    Pulmonary interstitial fibrosis-I reviewed the CT scan with the patient. He has complaints of short of breath on exertion. He has findings of interstitial fibrosis. I offered a pulmonology consultation but he wants to defer this at this time.     PLAN:  RTC with MD 4 weeks   C#6/6 R-CHOP + GCSF today   CBC CMP TSH today  Repeat PET scan 4 weeks  Continue Allopurinol 300mg daily  Continue follow-up with [facility]/Dr Danika Enamorado  Consider Pulmonology referral in the future when treatments complete      I, Dianne Reddy, am pre charting  as Medical Assistant for Laura Rousseau MD. Electronically signed by Dianne Reddy MA on 7/28/2022 at 3:27 PM CDT. Radha Tran am scribing for Laura Rousseau MD. Electronically signed by Cat Conte RN on 7/28/2022 at 9:05 AM CDT. I, Dr Melani Kimble, personally performed the services described in this documentation as scribed by Cat Conte RN in my presence and is both accurate and complete. I have seen, examined and reviewed this patient medication list, appropriate labs and imaging studies. I reviewed relevant medical records and others physicians notes. I discussed the plans of care with the patient. I answered all the questions to the patients satisfaction. I have also reviewed the chief complaint (CC) and part of the history (History of Present Illness (HPI), Past Family Social History [facility]), or Review of Systems (ROS) and made changes when appropriated.        (Please note that portions of this note were completed with a voice recognition program. Efforts were made to edit the dictations but occasionally words are mis-transcribed.)    Electronically signed by Laura Rousseau MD on 7/28/2022 at 9:05 AM

## 2022-07-28 ENCOUNTER — OFFICE VISIT (OUTPATIENT)
Dept: HEMATOLOGY | Age: 72
End: 2022-07-28
Payer: MEDICARE

## 2022-07-28 ENCOUNTER — HOSPITAL ENCOUNTER (OUTPATIENT)
Dept: INFUSION THERAPY | Age: 72
Discharge: HOME | End: 2022-07-28
Payer: MEDICARE

## 2022-07-28 VITALS
BODY MASS INDEX: 25.21 KG/M2 | RESPIRATION RATE: 18 BRPM | TEMPERATURE: 97.7 F | OXYGEN SATURATION: 97 % | SYSTOLIC BLOOD PRESSURE: 167 MMHG | HEART RATE: 98 BPM | DIASTOLIC BLOOD PRESSURE: 82 MMHG | HEIGHT: 67 IN | WEIGHT: 160.6 LBS

## 2022-07-28 DIAGNOSIS — R53.0 NEOPLASTIC MALIGNANT RELATED FATIGUE: ICD-10-CM

## 2022-07-28 DIAGNOSIS — T45.1X5D ADVERSE EFFECT OF CHEMOTHERAPY, SUBSEQUENT ENCOUNTER: ICD-10-CM

## 2022-07-28 DIAGNOSIS — C85.10 LARGE B-CELL LYMPHOMA (HCC): ICD-10-CM

## 2022-07-28 DIAGNOSIS — Z71.89 CARE PLAN DISCUSSED WITH PATIENT: ICD-10-CM

## 2022-07-28 DIAGNOSIS — C83.30 DIFFUSE LARGE B-CELL LYMPHOMA, UNSPECIFIED BODY REGION (HCC): Primary | ICD-10-CM

## 2022-07-28 DIAGNOSIS — Z51.12 ENCOUNTER FOR ANTINEOPLASTIC IMMUNOTHERAPY: ICD-10-CM

## 2022-07-28 DIAGNOSIS — C85.98 LYMPHOMA OF LYMPH NODES OF MULTIPLE SITES (HCC): Primary | ICD-10-CM

## 2022-07-28 DIAGNOSIS — C83.30 DIFFUSE LARGE B-CELL LYMPHOMA, UNSPECIFIED BODY REGION (HCC): ICD-10-CM

## 2022-07-28 DIAGNOSIS — C85.98 LYMPHOMA OF LYMPH NODES OF MULTIPLE SITES (HCC): ICD-10-CM

## 2022-07-28 DIAGNOSIS — Z51.11 CHEMOTHERAPY MANAGEMENT, ENCOUNTER FOR: ICD-10-CM

## 2022-07-28 LAB
ALBUMIN SERPL-MCNC: 4.4 G/DL (ref 3.5–5.2)
ALP BLD-CCNC: 84 U/L (ref 40–130)
ALT SERPL-CCNC: 16 U/L (ref 21–72)
ANION GAP SERPL CALCULATED.3IONS-SCNC: 11 MMOL/L (ref 7–19)
AST SERPL-CCNC: 24 U/L (ref 17–59)
BILIRUB SERPL-MCNC: 0.4 MG/DL (ref 0.2–1.3)
BUN BLDV-MCNC: 14 MG/DL (ref 9–20)
CALCIUM SERPL-MCNC: 9.4 MG/DL (ref 8.4–10.2)
CHLORIDE BLD-SCNC: 103 MMOL/L (ref 98–111)
CO2: 28 MMOL/L (ref 22–29)
CREAT SERPL-MCNC: 0.8 MG/DL (ref 0.6–1.2)
GFR NON-AFRICAN AMERICAN: >60
GLOBULIN: 2.4 G/DL
GLUCOSE BLD-MCNC: 115 MG/DL (ref 74–106)
HCT VFR BLD CALC: 37.3 % (ref 40.1–51)
HEMOGLOBIN: 12.1 G/DL (ref 13.7–17.5)
LYMPHOCYTES ABSOLUTE: 0.6 K/UL (ref 1.18–3.74)
LYMPHOCYTES RELATIVE PERCENT: 8.5 % (ref 19.3–53.1)
MCH RBC QN AUTO: 32.8 PG (ref 25.7–32.2)
MCHC RBC AUTO-ENTMCNC: 32.4 G/DL (ref 32.3–36.5)
MCV RBC AUTO: 101.1 FL (ref 79–92.2)
MONOCYTES ABSOLUTE: 0.73 K/UL (ref 0.24–0.82)
MONOCYTES RELATIVE PERCENT: 10.3 % (ref 4.7–12.5)
NEUTROPHILS ABSOLUTE: 5.53 K/UL (ref 1.56–6.13)
NEUTROPHILS RELATIVE PERCENT: 78.1 % (ref 34–71.1)
PDW BLD-RTO: 16.9 % (ref 11.6–14.4)
PLATELET # BLD: 223 K/UL (ref 163–337)
PMV BLD AUTO: 9.1 FL (ref 7.4–10.4)
POTASSIUM SERPL-SCNC: 3.9 MMOL/L (ref 3.5–5.1)
RBC # BLD: 3.69 M/UL (ref 4.63–6.08)
SODIUM BLD-SCNC: 142 MMOL/L (ref 137–145)
TOTAL PROTEIN: 6.8 G/DL (ref 6.3–8.2)
WBC # BLD: 7.08 K/UL (ref 4.23–9.07)

## 2022-07-28 PROCEDURE — 96411 CHEMO IV PUSH ADDL DRUG: CPT

## 2022-07-28 PROCEDURE — 96415 CHEMO IV INFUSION ADDL HR: CPT

## 2022-07-28 PROCEDURE — 96367 TX/PROPH/DG ADDL SEQ IV INF: CPT

## 2022-07-28 PROCEDURE — 85025 COMPLETE CBC W/AUTO DIFF WBC: CPT

## 2022-07-28 PROCEDURE — G8428 CUR MEDS NOT DOCUMENT: HCPCS | Performed by: INTERNAL MEDICINE

## 2022-07-28 PROCEDURE — 1036F TOBACCO NON-USER: CPT | Performed by: INTERNAL MEDICINE

## 2022-07-28 PROCEDURE — G8417 CALC BMI ABV UP PARAM F/U: HCPCS | Performed by: INTERNAL MEDICINE

## 2022-07-28 PROCEDURE — 96377 APPLICATON ON-BODY INJECTOR: CPT

## 2022-07-28 PROCEDURE — 2580000003 HC RX 258: Performed by: INTERNAL MEDICINE

## 2022-07-28 PROCEDURE — 96375 TX/PRO/DX INJ NEW DRUG ADDON: CPT

## 2022-07-28 PROCEDURE — 6360000002 HC RX W HCPCS: Performed by: INTERNAL MEDICINE

## 2022-07-28 PROCEDURE — 3017F COLORECTAL CA SCREEN DOC REV: CPT | Performed by: INTERNAL MEDICINE

## 2022-07-28 PROCEDURE — 1123F ACP DISCUSS/DSCN MKR DOCD: CPT | Performed by: INTERNAL MEDICINE

## 2022-07-28 PROCEDURE — 6370000000 HC RX 637 (ALT 250 FOR IP): Performed by: INTERNAL MEDICINE

## 2022-07-28 PROCEDURE — 96417 CHEMO IV INFUS EACH ADDL SEQ: CPT

## 2022-07-28 PROCEDURE — 96413 CHEMO IV INFUSION 1 HR: CPT

## 2022-07-28 PROCEDURE — 80053 COMPREHEN METABOLIC PANEL: CPT

## 2022-07-28 PROCEDURE — 99214 OFFICE O/P EST MOD 30 MIN: CPT | Performed by: INTERNAL MEDICINE

## 2022-07-28 RX ORDER — EPINEPHRINE 1 MG/ML
0.3 INJECTION, SOLUTION, CONCENTRATE INTRAVENOUS PRN
Status: CANCELLED | OUTPATIENT
Start: 2022-07-28

## 2022-07-28 RX ORDER — SODIUM CHLORIDE 9 MG/ML
20 INJECTION, SOLUTION INTRAVENOUS ONCE
Status: CANCELLED | OUTPATIENT
Start: 2022-07-28 | End: 2022-07-28

## 2022-07-28 RX ORDER — SODIUM CHLORIDE 9 MG/ML
INJECTION, SOLUTION INTRAVENOUS CONTINUOUS
Status: CANCELLED | OUTPATIENT
Start: 2022-07-28

## 2022-07-28 RX ORDER — DIPHENHYDRAMINE HYDROCHLORIDE 50 MG/ML
50 INJECTION INTRAMUSCULAR; INTRAVENOUS
Status: CANCELLED | OUTPATIENT
Start: 2022-07-28

## 2022-07-28 RX ORDER — PALONOSETRON 0.05 MG/ML
0.25 INJECTION, SOLUTION INTRAVENOUS ONCE
Status: CANCELLED | OUTPATIENT
Start: 2022-07-28 | End: 2022-07-28

## 2022-07-28 RX ORDER — SODIUM CHLORIDE 0.9 % (FLUSH) 0.9 %
5-40 SYRINGE (ML) INJECTION PRN
Status: DISCONTINUED | OUTPATIENT
Start: 2022-07-28 | End: 2022-07-29 | Stop reason: HOSPADM

## 2022-07-28 RX ORDER — HEPARIN SODIUM (PORCINE) LOCK FLUSH IV SOLN 100 UNIT/ML 100 UNIT/ML
500 SOLUTION INTRAVENOUS PRN
Status: CANCELLED | OUTPATIENT
Start: 2022-07-28

## 2022-07-28 RX ORDER — SODIUM CHLORIDE 9 MG/ML
20 INJECTION, SOLUTION INTRAVENOUS ONCE
Status: COMPLETED | OUTPATIENT
Start: 2022-07-28 | End: 2022-07-29

## 2022-07-28 RX ORDER — MEPERIDINE HYDROCHLORIDE 50 MG/ML
12.5 INJECTION INTRAMUSCULAR; INTRAVENOUS; SUBCUTANEOUS PRN
Status: CANCELLED | OUTPATIENT
Start: 2022-07-28

## 2022-07-28 RX ORDER — SODIUM CHLORIDE 9 MG/ML
25 INJECTION, SOLUTION INTRAVENOUS PRN
Status: CANCELLED | OUTPATIENT
Start: 2022-07-28

## 2022-07-28 RX ORDER — ONDANSETRON 2 MG/ML
8 INJECTION INTRAMUSCULAR; INTRAVENOUS
Status: CANCELLED | OUTPATIENT
Start: 2022-07-28

## 2022-07-28 RX ORDER — HEPARIN SODIUM (PORCINE) LOCK FLUSH IV SOLN 100 UNIT/ML 100 UNIT/ML
500 SOLUTION INTRAVENOUS PRN
Status: DISCONTINUED | OUTPATIENT
Start: 2022-07-28 | End: 2022-07-29 | Stop reason: HOSPADM

## 2022-07-28 RX ORDER — DIPHENHYDRAMINE HYDROCHLORIDE 50 MG/ML
25 INJECTION INTRAMUSCULAR; INTRAVENOUS ONCE
Status: CANCELLED | OUTPATIENT
Start: 2022-07-28

## 2022-07-28 RX ORDER — ACETAMINOPHEN 325 MG/1
650 TABLET ORAL
Status: CANCELLED | OUTPATIENT
Start: 2022-07-28

## 2022-07-28 RX ORDER — DIPHENHYDRAMINE HYDROCHLORIDE 50 MG/ML
25 INJECTION INTRAMUSCULAR; INTRAVENOUS ONCE
Status: COMPLETED | OUTPATIENT
Start: 2022-07-28 | End: 2022-07-28

## 2022-07-28 RX ORDER — PALONOSETRON 0.05 MG/ML
0.25 INJECTION, SOLUTION INTRAVENOUS ONCE
Status: COMPLETED | OUTPATIENT
Start: 2022-07-28 | End: 2022-07-28

## 2022-07-28 RX ORDER — ALBUTEROL SULFATE 90 UG/1
4 AEROSOL, METERED RESPIRATORY (INHALATION) PRN
Status: CANCELLED | OUTPATIENT
Start: 2022-07-28

## 2022-07-28 RX ORDER — SODIUM CHLORIDE 0.9 % (FLUSH) 0.9 %
5-40 SYRINGE (ML) INJECTION PRN
Status: CANCELLED | OUTPATIENT
Start: 2022-07-28

## 2022-07-28 RX ORDER — ACETAMINOPHEN 500 MG
1000 TABLET ORAL ONCE
Status: COMPLETED | OUTPATIENT
Start: 2022-07-28 | End: 2022-07-28

## 2022-07-28 RX ORDER — FAMOTIDINE 10 MG/ML
20 INJECTION, SOLUTION INTRAVENOUS
Status: CANCELLED | OUTPATIENT
Start: 2022-07-28

## 2022-07-28 RX ORDER — ACETAMINOPHEN 325 MG/1
1000 TABLET ORAL ONCE
Status: CANCELLED | OUTPATIENT
Start: 2022-07-28

## 2022-07-28 RX ORDER — SODIUM CHLORIDE 9 MG/ML
5-40 INJECTION INTRAVENOUS PRN
Status: CANCELLED | OUTPATIENT
Start: 2022-07-28

## 2022-07-28 RX ORDER — DEXAMETHASONE SODIUM PHOSPHATE 10 MG/ML
10 INJECTION, SOLUTION INTRAMUSCULAR; INTRAVENOUS ONCE
Status: COMPLETED | OUTPATIENT
Start: 2022-07-28 | End: 2022-07-28

## 2022-07-28 RX ADMIN — PEGFILGRASTIM 6 MG: KIT SUBCUTANEOUS at 13:21

## 2022-07-28 RX ADMIN — SODIUM CHLORIDE, PRESERVATIVE FREE 10 ML: 5 INJECTION INTRAVENOUS at 13:35

## 2022-07-28 RX ADMIN — Medication 500 UNITS: at 13:35

## 2022-07-28 RX ADMIN — SODIUM CHLORIDE 690 MG: 9 INJECTION, SOLUTION INTRAVENOUS at 10:06

## 2022-07-28 RX ADMIN — SODIUM CHLORIDE 64 MG: 9 INJECTION, SOLUTION INTRAVENOUS at 12:16

## 2022-07-28 RX ADMIN — PALONOSETRON 0.25 MG: 0.05 INJECTION, SOLUTION INTRAVENOUS at 09:33

## 2022-07-28 RX ADMIN — ACETAMINOPHEN 1000 MG: 500 TABLET ORAL at 09:33

## 2022-07-28 RX ADMIN — SODIUM CHLORIDE 20 ML/HR: 9 INJECTION, SOLUTION INTRAVENOUS at 09:34

## 2022-07-28 RX ADMIN — DEXAMETHASONE SODIUM PHOSPHATE 10 MG: 10 INJECTION, SOLUTION INTRAMUSCULAR; INTRAVENOUS at 09:33

## 2022-07-28 RX ADMIN — CYCLOPHOSPHAMIDE 1380 MG: 200 INJECTION, SOLUTION INTRAVENOUS at 11:41

## 2022-07-28 RX ADMIN — FOSAPREPITANT 150 MG: 150 INJECTION, POWDER, LYOPHILIZED, FOR SOLUTION INTRAVENOUS at 09:34

## 2022-07-28 RX ADMIN — VINCRISTINE SULFATE 2 MG: 1 INJECTION, SOLUTION INTRAVENOUS at 13:20

## 2022-07-28 RX ADMIN — DIPHENHYDRAMINE HYDROCHLORIDE 25 MG: 50 INJECTION, SOLUTION INTRAMUSCULAR; INTRAVENOUS at 09:33

## 2022-08-18 ENCOUNTER — APPOINTMENT (OUTPATIENT)
Dept: INFUSION THERAPY | Age: 72
End: 2022-08-18
Payer: MEDICARE

## 2022-08-25 ENCOUNTER — OFFICE VISIT (OUTPATIENT)
Dept: PRIMARY CARE CLINIC | Age: 72
End: 2022-08-25
Payer: MEDICARE

## 2022-08-25 VITALS
DIASTOLIC BLOOD PRESSURE: 80 MMHG | TEMPERATURE: 98.4 F | HEART RATE: 83 BPM | SYSTOLIC BLOOD PRESSURE: 132 MMHG | WEIGHT: 162 LBS | OXYGEN SATURATION: 99 % | BODY MASS INDEX: 25.43 KG/M2 | HEIGHT: 67 IN

## 2022-08-25 DIAGNOSIS — Z13.1 SCREENING FOR DIABETES MELLITUS: ICD-10-CM

## 2022-08-25 DIAGNOSIS — I10 ESSENTIAL HYPERTENSION: ICD-10-CM

## 2022-08-25 DIAGNOSIS — Z13.220 ENCOUNTER FOR SCREENING FOR LIPID DISORDER: ICD-10-CM

## 2022-08-25 DIAGNOSIS — C85.98 LYMPHOMA OF LYMPH NODES OF MULTIPLE REGIONS, UNSPECIFIED LYMPHOMA TYPE (HCC): Primary | ICD-10-CM

## 2022-08-25 DIAGNOSIS — Z12.5 SCREENING PSA (PROSTATE SPECIFIC ANTIGEN): ICD-10-CM

## 2022-08-25 DIAGNOSIS — I48.91 ATRIAL FIBRILLATION, UNSPECIFIED TYPE (HCC): ICD-10-CM

## 2022-08-25 LAB
ALBUMIN SERPL-MCNC: 4.4 G/DL (ref 3.5–5.2)
ALP BLD-CCNC: 77 U/L (ref 40–130)
ALT SERPL-CCNC: 16 U/L (ref 5–41)
ANION GAP SERPL CALCULATED.3IONS-SCNC: 8 MMOL/L (ref 7–19)
AST SERPL-CCNC: 25 U/L (ref 5–40)
BILIRUB SERPL-MCNC: 0.3 MG/DL (ref 0.2–1.2)
BUN BLDV-MCNC: 17 MG/DL (ref 8–23)
CALCIUM SERPL-MCNC: 9.4 MG/DL (ref 8.8–10.2)
CHLORIDE BLD-SCNC: 103 MMOL/L (ref 98–111)
CHOLESTEROL, TOTAL: 209 MG/DL (ref 160–199)
CO2: 28 MMOL/L (ref 22–29)
CREAT SERPL-MCNC: 0.8 MG/DL (ref 0.5–1.2)
GFR AFRICAN AMERICAN: >59
GFR NON-AFRICAN AMERICAN: >60
GLUCOSE BLD-MCNC: 99 MG/DL (ref 74–109)
HDLC SERPL-MCNC: 46 MG/DL (ref 55–121)
LDL CHOLESTEROL CALCULATED: 134 MG/DL
POTASSIUM SERPL-SCNC: 5.1 MMOL/L (ref 3.5–5)
PROSTATE SPECIFIC ANTIGEN: 0.82 NG/ML (ref 0–4)
SODIUM BLD-SCNC: 139 MMOL/L (ref 136–145)
TOTAL PROTEIN: 6.9 G/DL (ref 6.6–8.7)
TRIGL SERPL-MCNC: 145 MG/DL (ref 0–149)

## 2022-08-25 PROCEDURE — 3017F COLORECTAL CA SCREEN DOC REV: CPT | Performed by: NURSE PRACTITIONER

## 2022-08-25 PROCEDURE — 99214 OFFICE O/P EST MOD 30 MIN: CPT | Performed by: NURSE PRACTITIONER

## 2022-08-25 PROCEDURE — 1036F TOBACCO NON-USER: CPT | Performed by: NURSE PRACTITIONER

## 2022-08-25 PROCEDURE — G8427 DOCREV CUR MEDS BY ELIG CLIN: HCPCS | Performed by: NURSE PRACTITIONER

## 2022-08-25 PROCEDURE — G8417 CALC BMI ABV UP PARAM F/U: HCPCS | Performed by: NURSE PRACTITIONER

## 2022-08-25 PROCEDURE — 1123F ACP DISCUSS/DSCN MKR DOCD: CPT | Performed by: NURSE PRACTITIONER

## 2022-08-25 RX ORDER — NYSTATIN 100000 U/G
OINTMENT TOPICAL
COMMUNITY
Start: 2022-08-01

## 2022-08-25 SDOH — ECONOMIC STABILITY: FOOD INSECURITY: WITHIN THE PAST 12 MONTHS, YOU WORRIED THAT YOUR FOOD WOULD RUN OUT BEFORE YOU GOT MONEY TO BUY MORE.: NEVER TRUE

## 2022-08-25 SDOH — ECONOMIC STABILITY: FOOD INSECURITY: WITHIN THE PAST 12 MONTHS, THE FOOD YOU BOUGHT JUST DIDN'T LAST AND YOU DIDN'T HAVE MONEY TO GET MORE.: NEVER TRUE

## 2022-08-25 ASSESSMENT — PATIENT HEALTH QUESTIONNAIRE - PHQ9
SUM OF ALL RESPONSES TO PHQ QUESTIONS 1-9: 0
1. LITTLE INTEREST OR PLEASURE IN DOING THINGS: 0
2. FEELING DOWN, DEPRESSED OR HOPELESS: 0
SUM OF ALL RESPONSES TO PHQ QUESTIONS 1-9: 0
SUM OF ALL RESPONSES TO PHQ9 QUESTIONS 1 & 2: 0

## 2022-08-25 ASSESSMENT — ENCOUNTER SYMPTOMS
RESPIRATORY NEGATIVE: 1
GASTROINTESTINAL NEGATIVE: 1
EYES NEGATIVE: 1
ALLERGIC/IMMUNOLOGIC NEGATIVE: 1

## 2022-08-25 ASSESSMENT — SOCIAL DETERMINANTS OF HEALTH (SDOH): HOW HARD IS IT FOR YOU TO PAY FOR THE VERY BASICS LIKE FOOD, HOUSING, MEDICAL CARE, AND HEATING?: NOT HARD AT ALL

## 2022-08-25 NOTE — PATIENT INSTRUCTIONS
Labs today ,  Continue with dr Yumiko Aguilar your blood pressure every a.m And once random during the day. Record them. The goal is top number under 140 and bottom number under 80. May go to coumadin if xarelto too expensive.    Discuss pnuemvax with Dr Silvia Briggs

## 2022-08-25 NOTE — PROGRESS NOTES
lymphoma    Heart Disease Father        Social History     Tobacco Use    Smoking status: Former     Packs/day: 2.00     Years: 15.00     Pack years: 30.00     Types: Cigarettes     Quit date: 26     Years since quittin.6    Smokeless tobacco: Never   Substance Use Topics    Alcohol use: Yes     Comment: Occasional       Current Outpatient Medications on File Prior to Visit   Medication Sig Dispense Refill    nystatin (MYCOSTATIN) 331841 UNIT/GM ointment APPLY OINTMENT TOPICALLY TWICE DAILY      rivaroxaban (XARELTO) 20 MG TABS tablet Take 1 tablet by mouth daily (with breakfast) 30 tablet 5    lidocaine-prilocaine (EMLA) 2.5-2.5 % cream Apply topically to port area and cover with plastic wrap one hour prior to treatment. 1 each 1    turmeric 500 MG CAPS Take by mouth in the morning, at noon, and at bedtime       zinc gluconate 50 MG tablet Take 50 mg by mouth daily      ferrous sulfate (IRON 325) 325 (65 Fe) MG tablet Take 325 mg by mouth daily (with breakfast)      KRILL OIL PO Take by mouth      Red Yeast Rice Extract (RED YEAST RICE PO) Take by mouth      Probiotic Product (PROBIOTIC-10 PO) Take by mouth      Zn-Pyg Afri-Nettle-Saw Palmet (SAW PALMETTO COMPLEX PO) Take by mouth      Multiple Vitamins-Minerals (THERAPEUTIC MULTIVITAMIN-MINERALS) tablet Take 1 tablet by mouth daily      Vitamin D (CHOLECALCIFEROL) 1000 UNITS CAPS capsule Take 1,000 Units by mouth daily       niacin 500 MG CR capsule Take 500 mg by mouth nightly       dilTIAZem (CARDIZEM CD) 120 MG extended release capsule Take 1 capsule by mouth daily (Patient not taking: Reported on 2022) 30 capsule 5     No current facility-administered medications on file prior to visit.      No Known Allergies    Health Maintenance   Topic Date Due    COVID-19 Vaccine (1) Never done    Pneumococcal 65+ years Vaccine (1 - PCV) Never done    Hepatitis C screen  Never done    Shingles vaccine (1 of 2) Never done    DTaP/Tdap/Td vaccine (1 - Tdap) 11/10/2022 (Originally 9/1/1969)    Flu vaccine (1) 09/01/2022    Annual Wellness Visit (AWV)  10/09/2022    Depression Screen  05/25/2023    Colorectal Cancer Screen  03/17/2026    Lipids  10/08/2026    AAA screen  Completed    Hepatitis A vaccine  Aged Out    Hepatitis B vaccine  Aged Out    Hib vaccine  Aged Out    Meningococcal (ACWY) vaccine  Aged Out       Subjective:      Review of Systems   Constitutional:  Positive for fatigue and unexpected weight change. HENT: Negative. Eyes: Negative. Respiratory: Negative. Cardiovascular: Negative. Gastrointestinal: Negative. Endocrine: Negative. Genitourinary: Negative. Musculoskeletal: Negative. Allergic/Immunologic: Negative. Neurological: Negative. Hematological: Negative. Psychiatric/Behavioral: Negative. Objective:     Physical Exam  Vitals and nursing note reviewed. Constitutional:       Appearance: He is well-developed. HENT:      Head: Normocephalic. Right Ear: External ear normal.      Left Ear: External ear normal.      Nose: Nose normal.   Cardiovascular:      Rate and Rhythm: Normal rate and regular rhythm. Heart sounds: Murmur heard. Pulmonary:      Effort: Pulmonary effort is normal.      Breath sounds: Normal breath sounds. Skin:     General: Skin is warm and dry. Capillary Refill: Capillary refill takes less than 2 seconds. Coloration: Skin is pale. Neurological:      General: No focal deficit present. Mental Status: He is alert and oriented to person, place, and time. Psychiatric:         Mood and Affect: Mood normal.         Behavior: Behavior normal.         Thought Content: Thought content normal.         Judgment: Judgment normal.     /80   Pulse 83   Temp 98.4 °F (36.9 °C)   Ht 5' 7\" (1.702 m)   Wt 162 lb (73.5 kg)   SpO2 99%   BMI 25.37 kg/m²     Assessment:       Diagnosis Orders   1.  Lymphoma of lymph nodes of multiple regions, unspecified lymphoma type ([facility]        2. Encounter for screening for lipid disorder  Lipid Panel      3. Screening for diabetes mellitus  Comprehensive Metabolic Panel      4. Screening PSA (prostate specific antigen)  PSA Screening      5. Atrial fibrillation, unspecified type ([facility]        6. Essential hypertension              Plan:   More than 50% of the time was spent counseling and coordinating care for a total time of 30min face to face. I tried to put him into Pneumovax 20 today because of his lymphoma he wants to wait till his PET scan next week and then talk to Dr. Hayden Bertrand about it. I told him before when he gets here it would be a good idea. I did discuss with him Coumadin could be given instead of Xarelto but we would have to monitor his blood. It would be cheaper. I want him to discuss coming off of the Cardizem with heart doctor. He is monitoring his blood pressure and his heart rate I told him to continue this. PDMP Monitoring:    Last PDMP Bart as Reviewed:  Review User Review Instant Review Result            Urine Drug Screenings (1 yr)    No resulted procedures found. Medication Contract and Consent for Opioid Use Documents Filed        No documents found                     Patient given educational materials -see patient instructions. Discussed use, benefit, and side effects of prescribed medications. All patient questions answered. Pt voiced understanding. Reviewed health maintenance. Instructed to continue currentmedications, diet and exercise. Patient agreed with treatment plan. Follow up as directed. MEDICATIONS:  No orders of the defined types were placed in this encounter. ORDERS:  Orders Placed This Encounter   Procedures    Comprehensive Metabolic Panel    Lipid Panel    PSA Screening       Follow-up:  Return in about 6 months (around 2/25/2023).     PATIENT INSTRUCTIONS:  Patient Instructions   Labs today ,  Continue with dr Hayden Bertrand  Monitor your blood pressure every a.m And once random during the day. Record them. The goal is top number under 140 and bottom number under 80. May go to coumadin if xarelto too expensive. Discuss pnuemvax with Dr Clara Johnson  Electronically signed by MICHELA West CNP on 8/25/2022 at 8:59 AM    EMR Dragon/transcription disclaimer:  Much of thisencounter note is electronic transcription/translation of spoken language to printed texts. The electronic translation of spoken language may be erroneous, or at times, nonsensical words or phrases may be inadvertentlytranscribed.   Although I have reviewed the note for such errors, some may still exist.

## 2022-08-29 ENCOUNTER — HOSPITAL ENCOUNTER (OUTPATIENT)
Dept: NUCLEAR MEDICINE | Age: 72
Discharge: HOME | End: 2022-08-31
Payer: MEDICARE

## 2022-08-29 DIAGNOSIS — C85.10 LARGE B-CELL LYMPHOMA (HCC): ICD-10-CM

## 2022-08-29 DIAGNOSIS — C83.30 DIFFUSE LARGE B-CELL LYMPHOMA, UNSPECIFIED BODY REGION (HCC): ICD-10-CM

## 2022-08-29 DIAGNOSIS — C85.98 LYMPHOMA OF LYMPH NODES OF MULTIPLE SITES (HCC): ICD-10-CM

## 2022-08-29 LAB
GLUCOSE BLD-MCNC: 88 MG/DL (ref 70–99)
PERFORMED ON: NORMAL

## 2022-08-29 PROCEDURE — 3430000000 HC RX DIAGNOSTIC RADIOPHARMACEUTICAL: Performed by: INTERNAL MEDICINE

## 2022-08-29 PROCEDURE — 82947 ASSAY GLUCOSE BLOOD QUANT: CPT

## 2022-08-29 PROCEDURE — A9552 F18 FDG: HCPCS | Performed by: INTERNAL MEDICINE

## 2022-08-29 PROCEDURE — 78815 PET IMAGE W/CT SKULL-THIGH: CPT

## 2022-08-29 RX ORDER — FLUDEOXYGLUCOSE F 18 200 MCI/ML
10 INJECTION, SOLUTION INTRAVENOUS
Status: COMPLETED | OUTPATIENT
Start: 2022-08-29 | End: 2022-08-29

## 2022-08-29 RX ADMIN — FLUDEOXYGLUCOSE F 18 10 MILLICURIE: 200 INJECTION, SOLUTION INTRAVENOUS at 14:16

## 2022-08-30 NOTE — PROGRESS NOTES
MEDICAL ONCOLOGY PROGRESS NOTE    Pt Name: Molly Ospina  MRN: [id number]  YOB: 1950  Date of evaluation: 8/31/2022  History Obtained From:  patient, electronic medical record    HISTORY OF PRESENT ILLNESS:  The patient has a diagnosis of diffuse large B-cell lymphoma, ABC phenotype. Patient is currently status post completion of 6 cycles R-CHOP with G-CSF support. Treatment started 4/8/2022 and completed 7/28/2022. Patient denies any new complaints. He has a good appetite. He has gained about 3 pounds since completion of treatment. Denies any febrile illness. Denies any peripheral adenopathy. He is accompanied by his wife today. Diagnosis  Diffuse large B-cell lymphoma, March 2022  c-Myc, BCL6-negative  BCL2 -positive rearrangement  ABC phenotype  Stage IIIB    Treatment summary  4/8/2022- 7/28/22 Completed R-CHOP + GCSF every 3 weeks x 6 cycles    Hematology history  Reij Russ was first seen by me on 3/24/2022. The patient was referred by his primary care provider for findings of generalized lymphadenopathy. The patient has had symptoms of weight loss, night sweats and low-grade fever. This seems has been going on for many months now. Patient had Covid 19 infection last year. He had CT scans that showed generalized adenopathy. 3/22/2022-CT soft tissue neck showed findings of cervical adenopathy identified, including a right level 3 node measuring 1.6 cm in greatest axial diameter (series 4-image 95). There is a left level 5 lymph node measuring 1.7 cm on axial image 102. Multiple enlarged left level 4 nodes, including a 2.3 cm node on axial image 114. Adenopathy extends down into the left supraclavicular fossa and there is bulky adenopathy also appreciated in the upper mediastinum. 3/22/2022-CT chest with contrast showed findings of mediastinal adenopathy. Subcarinal lymph nodes are present. Right hilar lymph nodes are present.  Left para-aortic adenopathy is present in the abdomen. Concern for lymphoma. 3/22/2002-CT abdomen/pelvis showed spleen is enlarged measuring 14 cm craniocaudal dimension. Bilateral renal cysts including dominant 5.4 cm RIGHT upper pole exophytic renal cyst. 3 mm nonobstructing LEFT lower pole renal calculus. Prostate is mildly enlarged measuring 4.8 cm transverse dimension. Bulky retroperitoneal lymphadenopathy is noted. Noted conglomerate in the LEFT periaortic region on image 34 series 4 measures 6.0 x 3.8 cm. Enlarged gastrohepatic ligament lymph nodes with reference node on image 23 measuring 14 mm short axis dimension. No enlarged pelvic or inguinal lymph nodes identified. Bilateral chronic L5 pars defects with grade 1 anterolisthesis of L5 on S1. No acute osseous finding. 3/24/2022- (H), hemoglobin 10.1/MCV 93, platelets 139,988, WBC 8.7  3/24/2022-she was first seen by me. Recommended excisional biopsy left supraclavicular lymph node. 3/24/2022  B2M 2.8, Uric Acid 3.6,   3/28/2022 Left Cervical Lymphadenopathy (BHP): The dominant nodes are seen in the left level 4  and left level 5 neck as well as in the left supraclavicular fossa and upper mediastinum. Degenerative spinal stenosis. Atheromatous calcification in the carotid bulbs. 3/28/2022-excisional anibal biopsy. Final pathology pending. Preliminary results suggestive of high-grade lymphoma. Recommend allopurinol 300 mg p.o. daily. Recommend a PET scan and bone marrow biopsy. 4/4/2022-IHC studies from excisional node biopsy consistent with diffuse large B-cell lymphoma, ABC phenotype. BCL2, BCL6 and c-Myc rearrangement in process. Recommended R-CHOP x6 cycles. Started on allopurinol. 4/6/22 Bone marrow ([facility]): BONE MARROW: Normocellular (30-35% cellular) marrow with multilineage hematopoiesis. Negative for lymphoid infiltrates. Negative for dysplasia, no increase in blasts. Storage iron present, no ring sideroblasts.  FLOW CYTOMETRY: No B-cell monoclonality and no T-cell aberrant antigenic expression. The blasts are not increa we need to sed. CYTOGENETICS: Normal male karyotype. 4/7/22 PET CT SKULL BASE TO MID THIGH  Extensive lymphadenopathy consistent with lymphoma. Many of these nodes are extremely metabolically active including SUV measurements of greater than 40-50 in some of the nodes. There is extensive adenopathy in the lower cervical chains including level 3, 4 and 5 nodes. Supraclavicular lymphadenopathy is present with extension into the superior mediastinum. Both anterior and middle mediastinal adenopathy as well as right hilar adenopathy is noted within the chest. A right retrocrural node, gastrohepatic ligament nodes and bulky left para-aortic nodes are also present all demonstrating intense abnormal metabolic activity. 4/8/2022- Initiated R-CHOP + GCSF every 3 weeks x 6 cycles  5/12/2022 2D Echocardiogram Left ventricle normal chamber size and thickness  Preserved systolic function with estimated ejection fraction of 54%  However, GLS is slightly decreased especially the apex. This is more sensitive to predict chemotherapy induced cardiomyopathy then ejection  fraction  No regional wall motion abnormality Mitral annulus calcification with moderate degree of mitral leaflet  thickening and normal mobility  Bicuspid aortic valve with gradient of 10 mm. Judging from stroke-volume  index which is 27.7, which is reduced. This may represent low gradient, normal ejection fraction, low stroke-volume significant aortic stenosis. If patient is symptomatic, please refer to cardiology for ARIADNA  5/16/2022 Echo ARIADNA LVEF 55%. Mildly thickened aortic valve leaflets with preserved leaflet mobility. Moderate TR.  6/10/2022 CT Chest WO Contrast Chronic fibrosing interstitial lung disease - fibrotic NSIP versus probable UIP. Etiology could be drug induced.  Few subcentimeter sized mediastinal lymph nodes are seen at bilateral upper paratracheal, bilateral lower paratracheal and subcarinal location. Multiple small liver cyst.Cholelithiasis without cholecystitis. Right renal simple cyst.Degenerative changes in the spine. Multiple subcentimeter bilateral nonobstructive calyceal calcifications. Recommendation:Follow up as clinically indicated. 6/10/2022 CT Abd/Pelvis WO Contrast There is decrease in the size of retroperitoneal lymphadenopathy compared to prior study, suggestive of response to treatment. Cholelithiasis without evidence of cholecystitis. Multiple simple liver cysts. Recommendation: Follow up as clinically indicated. 6/10/2022 CT Soft Tissue Neck WO Contrast Few enlarge nodes are noted in left level IV. Few sub cm sized noted in level Ia and bilateral level Ib. Recommendation:Follow up as clinically indicated. 6/16/2022-completion 3 cycles R-CHOP. CT chest abdomen pelvis and soft tissue neck showed interval response to treatment. 7/7/2022-cycle #5 R-CHOP with 25% dose reduction of Adriamycin due to elevated LFTs 3-4 times. Normal bilirubin. Low-dose correction was increasing her cyclophosphamide. Messaged cardiology about amiodarone. I believe this is related to him being on amiodarone. 4/8/2022- 7/28/22 Completed R-CHOP + GCSF every 3 weeks x 6 cycles. 8/25/2022 PSA 0.82  8/30/2022 PET/CT Skull Base to Mid Thigh 8/30/2022 PET CT Skull Base to Mid Thigh Compared to prior PET/CT  there has been positive response to treatment. The largest measures approximately 3.9 mm in transverse diameter and 2.3 mm in AP diameter. Nonobstructing left renal stone measuring up to 3 mm in the lower pole unchanged. Lymphadenopathy has resolved or significantly decreased in size with non focal suspicious remaining hypermetabolic uptake appreciated. Lymphadenopathy in the left thyroid Umair has significantly decreased in size and is no longer hypermetabolic with the largest lymph node remaining measuring 1.0 x 2.5 cm.  8/31/2022-essentially, PET/CT scan showed complete resolution of prior hypermetabolic adenopathy. This is consistent with complete imaging response. Recommended clinical/imaging surveillance. Past Medical History:    Past Medical History:   Diagnosis Date    Cancer ([address]     lymphoma    Chronic neck pain     Herniated disc, cervical     HTN (hypertension)        Past Surgical History:    Past Surgical History:   Procedure Laterality Date    LYMPH NODE BIOPSY      left side of neck    PORT SURGERY N/A 4/8/2022    SINGLE LUMEN PORT PLACEMENT WITH US & FLUORO performed by Sonal Goff MD at [place] [id number] History:    Marital status:  Smoking status:Former smoker, Quit more 25 years ago  ETOH status:No   Resides:[place]    Family History:   Family History   Problem Relation Age of Onset    Cancer Mother         lymphoma    Heart Disease Father        Current Hospital Medications:    Current Outpatient Medications   Medication Sig Dispense Refill    nystatin (MYCOSTATIN) 198543 UNIT/GM ointment APPLY OINTMENT TOPICALLY TWICE DAILY      rivaroxaban (XARELTO) 20 MG TABS tablet Take 1 tablet by mouth daily (with breakfast) 30 tablet 5    lidocaine-prilocaine (EMLA) 2.5-2.5 % cream Apply topically to port area and cover with plastic wrap one hour prior to treatment.  1 each 1    turmeric 500 MG CAPS Take by mouth in the morning, at noon, and at bedtime       zinc gluconate 50 MG tablet Take 50 mg by mouth daily      ferrous sulfate (IRON 325) 325 (65 Fe) MG tablet Take 325 mg by mouth daily (with breakfast)      KRILL OIL PO Take by mouth      Red Yeast Rice Extract (RED YEAST RICE PO) Take by mouth      Probiotic Product (PROBIOTIC-10 PO) Take by mouth      Zn-Pyg Afri-Nettle-Saw Palmet (SAW PALMETTO COMPLEX PO) Take by mouth      Multiple Vitamins-Minerals (THERAPEUTIC MULTIVITAMIN-MINERALS) tablet Take 1 tablet by mouth daily      Vitamin D (CHOLECALCIFEROL) 1000 UNITS CAPS capsule Take 1,000 Units by mouth daily       niacin 500 MG CR capsule Take 500 mg by mouth nightly       dilTIAZem (CARDIZEM CD) 120 MG extended release capsule Take 1 capsule by mouth daily (Patient not taking: No sig reported) 30 capsule 5     No current facility-administered medications for this visit. Allergies: No Known Allergies      Subjective    REVIEW OF SYSTEMS:   CONSTITUTIONAL: no fever, no night sweats, no fatigue;  HEENT: no blurring of vision, no double vision, no hearing difficulty, no tinnitus, no ulceration, no dysplasia, no epistaxis;   LUNGS: no cough, no hemoptysis, no wheeze,  no shortness of breath;  CARDIOVASCULAR: no palpitation, atypical chest pain, no shortness of breath;  GI: no abdominal pain, no nausea, no vomiting, no diarrhea, no constipation;  KARINA: no dysuria, no hematuria, no frequency or urgency, no nephrolithiasis;  MUSCULOSKELETAL: joint pain, no swelling, no stiffness;  ENDOCRINE: no polyuria, no polydipsia, no cold or heat intolerance;  HEMATOLOGY: no easy bruising or bleeding, no history of clotting disorder;  DERMATOLOGY: no skin rash, no eczema, no pruritus;  PSYCHIATRY: no depression, no anxiety, no panic attacks, no suicidal ideation, no homicidal ideation;  NEUROLOGY:knuckle tenderness, no syncope, no seizures, no numbness or tingling of hands, no numbness or tingling of feet, no paresis;      Objective   BP (!) 146/76   Pulse 96   Ht 5' 7\" (1.702 m)   Wt 163 lb (73.9 kg)   SpO2 99%   BMI 25.53 kg/m²   Wt Readings from Last 3 Encounters:   08/31/22 163 lb (73.9 kg)   08/25/22 162 lb (73.5 kg)   07/28/22 160 lb 9.6 oz (72.8 kg)       PHYSICAL EXAM:  CONSTITUTIONAL: Alert, appropriate, no acute distress  EYES: Non icteric, EOM intact, pupils equal round   ENT: Mucus membranes moist, no oral pharyngeal lesions, external inspection of ears and nose are normal.  NECK: Supple, no masses. No palpable thyroid mass  CHEST/LUNGS: CTA bilaterally, normal respiratory effort   CARDIOVASCULAR: RRR, no murmurs.   No lower extremity edema  ABDOMEN: soft non-tender, active bowel sounds, no HSM. No palpable masses  EXTREMITIES: warm, full ROM in all 4 extremities, no focal weakness. SKIN: warm, dry with no rashes or lesions  LYMPH: No cervical, clavicular, axillary, or inguinal lymphadenopathy  NEUROLOGIC: follows commands, non focal     LABORATORY RESULTS REVIEWED/ANALYZED BY ME:  8/25/2022 PSA 0.82    Lab Results   Component Value Date    WBC 2.92 (L) 08/31/2022    HGB 13.7 08/31/2022    HCT 43.1 08/31/2022    .4 (H) 08/31/2022     (L) 08/31/2022     Lab Results   Component Value Date    NEUTROABS 0.88 (L) 08/31/2022     Lab Results   Component Value Date     08/25/2022    K 5.1 (H) 08/25/2022     08/25/2022    CO2 28 08/25/2022    BUN 17 08/25/2022    CREATININE 0.8 08/25/2022    GLUCOSE 99 08/25/2022    CALCIUM 9.4 08/25/2022    PROT 6.9 08/25/2022    LABALBU 4.4 08/25/2022    BILITOT 0.3 08/25/2022    ALKPHOS 77 08/25/2022    AST 25 08/25/2022    ALT 16 08/25/2022    LABGLOM >60 08/25/2022    GFRAA >59 08/25/2022    AGRATIO 1.5 09/19/2016    GLOB 2.4 07/28/2022           RADIOLOGY STUDIES REPORT REVIEWED/INTERPRETED BY ME:  8/29/2022 PET/CT Skull Base to Mid Thigh 8/30/2022 PET CT Skull Base to Mid Thigh Compared to prior PET/CT  there has been positive response to treatment. The largest measures approximately 3.9 mm in transverse diameter and 2.3 mm in AP diameter. Nonobstructing left renal stone measuring up to 3 mm in the lower pole unchanged. Lymphadenopathy has resolved or significantly decreased in size with non focal suspicious remaining hypermetabolic uptake appreciated. Lymphadenopathy in the left thyroid Umair has significantly decreased in size and is no longer hypermetabolic with the largest lymph node remaining measuring 1.0 x 2.5 cm.       ASSESSMENT:  #Diffuse large B-cell lymphoma, stage IIIB, ABC phenotype (c-Myc(-), BCL-2 (+), BCL6(-)rearrangement)  -3/28/2022-excisional node biopsy by Dr. Ally Del Toro, [facility].  -Path consistent with ABC phenotype DLBCL: Lymphoma as per Dr. Nayeli Arvizu. -PET scan and bone marrow biopsy-ordered today on 4/4/2022.  -Persistent B symptoms.  - PET scan/bone marrow biopsy on 4/4/2022. (Negative for lymphoma involvement)  Extensive lymphadenopathy consistent with lymphoma. Many of these nodes are extremely metabolically active including SUV measurements of greater than 40-50 in some of the nodes. There is extensive adenopathy in the lower cervical chains including level 3, 4 and 5 nodes. Supraclavicular lymphadenopathy is present with extension into the superior mediastinum. Both anterior and middle mediastinal adenopathy as well as right hilar adenopathy is noted within the chest. A right retrocrural node, gastrohepatic ligament nodes and bulky left para-aortic nodes are also present all demonstrating intense abnormal metabolic activity. .      Recommended:  -R-CHOP x6 cycles  G-CSF support  6/10/2022-CT neck/C/A/P-interval response to treatment after 3 cycles. 8/30/2022 PET/CT Skull Base to Mid Thigh 8/30/2022 PET CT Skull Base to Mid Thigh Compared to prior PET/CT  there has been positive response to treatment. The largest measures approximately 3.9 mm in transverse diameter and 2.3 mm in AP diameter. Nonobstructing left renal stone measuring up to 3 mm in the lower pole unchanged. Lymphadenopathy has resolved or significantly decreased in size with non focal suspicious remaining hypermetabolic uptake appreciated. Lymphadenopathy in the left thyroid Umair has significantly decreased in size and is no longer hypermetabolic with the largest lymph node remaining measuring 1.0 x 2.5 cm.  8/31/2022-essentially, PET/CT scan showed complete resolution of prior hypermetabolic adenopathy. This is consistent with complete imaging response. Recommended clinical/imaging surveillance. Recommended follow-up as per NCCN guidelines.       Treatment related toxicity-fatigue, anemia, abnormal LFTs have resolved, neutropenia    Treatment related anemia  Lab Results   Component Value Date    WBC 2.92 (L) 08/31/2022    HGB 13.7 08/31/2022    HCT 43.1 08/31/2022    .4 (H) 08/31/2022     (L) 08/31/2022       Antineoplastic induced neutropenia  Lab Results   Component Value Date    NEUTROABS 0.88 (L) 08/31/2022   -Afebrile. Continue to monitor [address] -CBC in 2 and 4 weeks      Pulmonary interstitial fibrosis-I reviewed the CT scan with the patient. He has complaints of short of breath on exertion. He has findings of interstitial fibrosis. I offered a pulmonology consultation but he wants to defer this at this time. Port care-port flush q 8 weeks      PLAN:  RTC with MD 3 months   Port flush in 4 weeks and then next MD visit  CBC in 2 weeks and 4 weeks  Repeat CT scans 6 months, end Feb 2023 (CT neck, chest, abdomen pelvis)  Continue follow-up with [facility]/Dr Marija Mccoy  Consider Pulmonology referral in the future when treatments complete    Lito YANEZ am pre charting  as Medical Assistant for Teja Randolph MD. Electronically signed by Lito Elizabeth MA on 8/31/2022 at 1:20 PM CDT. Roxanne Ingram am scribing for Teja Randolph MD. Electronically signed by Lydia Phillip RN on 8/31/2022 at 9:41 AM CDT. I, Dr Annie Grossman, personally performed the services described in this documentation as scribed by Lydia Phillip RN in my presence and is both accurate and complete. I have seen, examined and reviewed this patient medication list, appropriate labs and imaging studies. I reviewed relevant medical records and others physicians notes. I discussed the plans of care with the patient. I answered all the questions to the patients satisfaction. I have also reviewed the chief complaint (CC) and part of the history (History of Present Illness (HPI), Past Family Social History [facility]), or Review of Systems (ROS) and made changes when appropriated.        (Please note that portions of this note were completed with a voice recognition program. Efforts were made to edit the dictations but occasionally words are mis-transcribed.)  Electronically signed by Firman Sever, MD on 8/31/2022 at 9:49 AM

## 2022-08-31 ENCOUNTER — OFFICE VISIT (OUTPATIENT)
Dept: HEMATOLOGY | Age: 72
End: 2022-08-31
Payer: MEDICARE

## 2022-08-31 ENCOUNTER — HOSPITAL ENCOUNTER (OUTPATIENT)
Dept: INFUSION THERAPY | Age: 72
Discharge: HOME | End: 2022-08-31
Payer: MEDICARE

## 2022-08-31 VITALS
BODY MASS INDEX: 25.58 KG/M2 | OXYGEN SATURATION: 99 % | SYSTOLIC BLOOD PRESSURE: 146 MMHG | HEIGHT: 67 IN | WEIGHT: 163 LBS | DIASTOLIC BLOOD PRESSURE: 76 MMHG | HEART RATE: 96 BPM

## 2022-08-31 DIAGNOSIS — Z51.11 CHEMOTHERAPY MANAGEMENT, ENCOUNTER FOR: ICD-10-CM

## 2022-08-31 DIAGNOSIS — C83.30 DIFFUSE LARGE B-CELL LYMPHOMA, UNSPECIFIED BODY REGION (HCC): ICD-10-CM

## 2022-08-31 DIAGNOSIS — C85.98 LYMPHOMA OF LYMPH NODES OF MULTIPLE SITES (HCC): ICD-10-CM

## 2022-08-31 DIAGNOSIS — T45.1X5D ADVERSE EFFECT OF CHEMOTHERAPY, SUBSEQUENT ENCOUNTER: ICD-10-CM

## 2022-08-31 DIAGNOSIS — T45.1X5A CHEMOTHERAPY INDUCED NEUTROPENIA (HCC): ICD-10-CM

## 2022-08-31 DIAGNOSIS — Z71.89 CARE PLAN DISCUSSED WITH PATIENT: ICD-10-CM

## 2022-08-31 DIAGNOSIS — C83.32 DIFFUSE LARGE B-CELL LYMPHOMA OF INTRATHORACIC LYMPH NODES (HCC): Primary | ICD-10-CM

## 2022-08-31 DIAGNOSIS — D70.1 CHEMOTHERAPY INDUCED NEUTROPENIA (HCC): ICD-10-CM

## 2022-08-31 LAB
BASOPHILS ABSOLUTE: 0.05 K/UL (ref 0.01–0.08)
BASOPHILS RELATIVE PERCENT: 1.7 % (ref 0.1–1.2)
EOSINOPHILS ABSOLUTE: 0.12 K/UL (ref 0.04–0.54)
EOSINOPHILS RELATIVE PERCENT: 4.1 % (ref 0.7–7)
HCT VFR BLD CALC: 43.1 % (ref 40.1–51)
HEMOGLOBIN: 13.7 G/DL (ref 13.7–17.5)
LYMPHOCYTES ABSOLUTE: 0.9 K/UL (ref 1.18–3.74)
LYMPHOCYTES RELATIVE PERCENT: 30.8 % (ref 19.3–53.1)
MCH RBC QN AUTO: 32.9 PG (ref 25.7–32.2)
MCHC RBC AUTO-ENTMCNC: 31.8 G/DL (ref 32.3–36.5)
MCV RBC AUTO: 103.4 FL (ref 79–92.2)
MONOCYTES ABSOLUTE: 0.96 K/UL (ref 0.24–0.82)
MONOCYTES RELATIVE PERCENT: 32.9 % (ref 4.7–12.5)
NEUTROPHILS ABSOLUTE: 0.88 K/UL (ref 1.56–6.13)
NEUTROPHILS RELATIVE PERCENT: 30.2 % (ref 34–71.1)
PDW BLD-RTO: 14.3 % (ref 11.6–14.4)
PLATELET # BLD: 152 K/UL (ref 163–337)
PMV BLD AUTO: 10.8 FL (ref 7.4–10.4)
RBC # BLD: 4.17 M/UL (ref 4.63–6.08)
WBC # BLD: 2.92 K/UL (ref 4.23–9.07)

## 2022-08-31 PROCEDURE — 1123F ACP DISCUSS/DSCN MKR DOCD: CPT | Performed by: INTERNAL MEDICINE

## 2022-08-31 PROCEDURE — G8417 CALC BMI ABV UP PARAM F/U: HCPCS | Performed by: INTERNAL MEDICINE

## 2022-08-31 PROCEDURE — 99214 OFFICE O/P EST MOD 30 MIN: CPT | Performed by: INTERNAL MEDICINE

## 2022-08-31 PROCEDURE — G8427 DOCREV CUR MEDS BY ELIG CLIN: HCPCS | Performed by: INTERNAL MEDICINE

## 2022-08-31 PROCEDURE — 99211 OFF/OP EST MAY X REQ PHY/QHP: CPT

## 2022-08-31 PROCEDURE — 1036F TOBACCO NON-USER: CPT | Performed by: INTERNAL MEDICINE

## 2022-08-31 PROCEDURE — 85025 COMPLETE CBC W/AUTO DIFF WBC: CPT

## 2022-08-31 PROCEDURE — 3017F COLORECTAL CA SCREEN DOC REV: CPT | Performed by: INTERNAL MEDICINE

## 2022-09-07 RX ORDER — AMOXICILLIN AND CLAVULANATE POTASSIUM 875; 125 MG/1; MG/1
1 TABLET, FILM COATED ORAL 2 TIMES DAILY
Qty: 14 TABLET | Refills: 0 | Status: SHIPPED | OUTPATIENT
Start: 2022-09-07 | End: 2022-09-14

## 2022-09-08 ENCOUNTER — OFFICE VISIT (OUTPATIENT)
Age: 72
End: 2022-09-08
Payer: MEDICARE

## 2022-09-08 VITALS
HEIGHT: 67 IN | SYSTOLIC BLOOD PRESSURE: 148 MMHG | WEIGHT: 162.8 LBS | HEART RATE: 97 BPM | DIASTOLIC BLOOD PRESSURE: 80 MMHG | OXYGEN SATURATION: 98 % | TEMPERATURE: 98.9 F | RESPIRATION RATE: 15 BRPM | BODY MASS INDEX: 25.55 KG/M2

## 2022-09-08 DIAGNOSIS — K04.7 DENTAL ABSCESS: Primary | ICD-10-CM

## 2022-09-08 PROCEDURE — 1123F ACP DISCUSS/DSCN MKR DOCD: CPT | Performed by: NURSE PRACTITIONER

## 2022-09-08 PROCEDURE — G8417 CALC BMI ABV UP PARAM F/U: HCPCS | Performed by: NURSE PRACTITIONER

## 2022-09-08 PROCEDURE — 99213 OFFICE O/P EST LOW 20 MIN: CPT | Performed by: NURSE PRACTITIONER

## 2022-09-08 PROCEDURE — 3017F COLORECTAL CA SCREEN DOC REV: CPT | Performed by: NURSE PRACTITIONER

## 2022-09-08 PROCEDURE — 1036F TOBACCO NON-USER: CPT | Performed by: NURSE PRACTITIONER

## 2022-09-08 PROCEDURE — G8427 DOCREV CUR MEDS BY ELIG CLIN: HCPCS | Performed by: NURSE PRACTITIONER

## 2022-09-08 RX ORDER — PENICILLIN V POTASSIUM 500 MG/1
500 TABLET ORAL 2 TIMES DAILY
Qty: 20 TABLET | Refills: 0 | Status: SHIPPED | OUTPATIENT
Start: 2022-09-08 | End: 2022-09-18

## 2022-09-08 ASSESSMENT — ENCOUNTER SYMPTOMS
COUGH: 0
EYE DISCHARGE: 0
ABDOMINAL DISTENTION: 0
EYE PAIN: 0
ABDOMINAL PAIN: 0
SHORTNESS OF BREATH: 0
STRIDOR: 0
COLOR CHANGE: 0
WHEEZING: 0
SINUS PRESSURE: 0
SORE THROAT: 0
CHEST TIGHTNESS: 0
TROUBLE SWALLOWING: 0

## 2022-09-08 NOTE — PATIENT INSTRUCTIONS
Exam does not correlate with TMJ due to location of pain and quality of teeth. Advised patient that he needs to set up appoint with dentist as soon as he can. Patient states he was set up with a dentist a while ago to take care of dental issues but then he was diagnosed with cancer and he was unable to go. Switched medication per request.  Patient requested steroids but provider decided this was not the best course of action given patient history. Ice area. Follow-up as needed    If high persistent fevers, chest pain, shortness of breath, or palpitations occur go to ER.

## 2022-09-08 NOTE — PROGRESS NOTES
[id number]  [address] [address] [place] [address]  Dept: [phone]  Dept Fax: [phone]  Loc: [phone]    Braeden Watson is a 67 y.o. male who presents today for his medical conditions/complaints as noted below. Braeden Watson is complaining of Facial Swelling (Cheek is really swollen and is very agitated)        HPI:   HPI  Margareth Kingston presents today complaining of left jaw swelling. Patient states he is taking amoxicillin and it is not helping and would like a change. He states he is taking ibuprofen and it is helping. He does not seem to think it is caused by a tooth. He denies any trauma to area. He states pain is worse when eating.        Past Medical History:   Diagnosis Date    Cancer [facility])     lymphoma    Chronic neck pain     Herniated disc, cervical     HTN (hypertension)        Past Surgical History:   Procedure Laterality Date    LYMPH NODE BIOPSY      left side of neck    PORT SURGERY N/A 2022    SINGLE LUMEN PORT PLACEMENT WITH US & FLUORO performed by Vikki Flores MD at [facility] History   Problem Relation Age of Onset    Cancer Mother         lymphoma    Heart Disease Father        Social History     Tobacco Use    Smoking status: Former     Packs/day: 2.00     Years: 15.00     Pack years: 30.00     Types: Cigarettes     Quit date:      Years since quittin.7    Smokeless tobacco: Never   Substance Use Topics    Alcohol use: Yes     Comment: Occasional         Current Outpatient Medications   Medication Sig Dispense Refill    penicillin v potassium (VEETID) 500 MG tablet Take 1 tablet by mouth in the morning and at bedtime for 10 days 20 tablet 0    amoxicillin-clavulanate (AUGMENTIN) 875-125 MG per tablet Take 1 tablet by mouth 2 times daily for 7 days 14 tablet 0    nystatin (MYCOSTATIN) 490936 UNIT/GM ointment APPLY OINTMENT TOPICALLY TWICE DAILY      dilTIAZem (CARDIZEM CD) 120 MG extended release capsule Take 1 capsule by mouth daily (Patient not taking: No sig reported) 30 capsule 5    rivaroxaban (XARELTO) 20 MG TABS tablet Take 1 tablet by mouth daily (with breakfast) 30 tablet 5    lidocaine-prilocaine (EMLA) 2.5-2.5 % cream Apply topically to port area and cover with plastic wrap one hour prior to treatment. 1 each 1    turmeric 500 MG CAPS Take by mouth in the morning, at noon, and at bedtime       zinc gluconate 50 MG tablet Take 50 mg by mouth daily      ferrous sulfate (IRON 325) 325 (65 Fe) MG tablet Take 325 mg by mouth daily (with breakfast)      KRILL OIL PO Take by mouth      Red Yeast Rice Extract (RED YEAST RICE PO) Take by mouth      Probiotic Product (PROBIOTIC-10 PO) Take by mouth      Zn-Pyg Afri-Nettle-Saw Palmet (SAW PALMETTO COMPLEX PO) Take by mouth      Multiple Vitamins-Minerals (THERAPEUTIC MULTIVITAMIN-MINERALS) tablet Take 1 tablet by mouth daily      Vitamin D (CHOLECALCIFEROL) 1000 UNITS CAPS capsule Take 1,000 Units by mouth daily       niacin 500 MG CR capsule Take 500 mg by mouth nightly        No current facility-administered medications for this visit. No Known Allergies    Health Maintenance   Topic Date Due    COVID-19 Vaccine (1) Never done    Pneumococcal 65+ years Vaccine (1 - PCV) Never done    Hepatitis C screen  Never done    Shingles vaccine (1 of 2) Never done    Flu vaccine (1) Never done    DTaP/Tdap/Td vaccine (1 - Tdap) 11/10/2022 (Originally 9/1/1969)    Annual Wellness Visit (AWV)  10/09/2022    Depression Screen  08/25/2023    Colorectal Cancer Screen  03/17/2026    Lipids  08/25/2027    AAA screen  Completed    Hepatitis A vaccine  Aged Out    Hepatitis B vaccine  Aged Out    Hib vaccine  Aged Out    Meningococcal (ACWY) vaccine  Aged Out       Subjective:   Review of Systems   Constitutional:  Negative for chills, fatigue and fever. HENT:  Positive for dental problem.  Negative for congestion, sinus pressure, sore throat and trouble General: No swelling or deformity. Normal range of motion. Cervical back: Normal range of motion. No rigidity or tenderness. Skin:     General: Skin is warm and dry. Findings: No rash. Neurological:      General: No focal deficit present. Mental Status: He is alert and oriented to person, place, and time. Sensory: No sensory deficit. BP (!) 148/80   Pulse 97   Temp 98.9 °F (37.2 °C)   Resp 15   Ht 5' 7\" (1.702 m)   Wt 162 lb 12.8 oz (73.8 kg)   SpO2 98%   BMI 25.50 kg/m²     Assessment         Diagnosis Orders   1. Dental abscess  penicillin v potassium (VEETID) 500 MG tablet          Plan   Exam does not correlate with TMJ due to location of pain and quality of teeth. Advised patient that he needs to set up appoint with dentist as soon as he can. Patient states he was set up with a dentist a while ago to take care of dental issues but then he was diagnosed with cancer and he was unable to go. Switched medication per request.  Patient requested steroids but provider decided this was not the best course of action given patient history. Ice area. Follow-up as needed    If high persistent fevers, chest pain, shortness of breath, or palpitations occur go to ER. No orders of the defined types were placed in this encounter. No results found for this visit on 09/08/22. Orders Placed This Encounter   Medications    penicillin v potassium (VEETID) 500 MG tablet     Sig: Take 1 tablet by mouth in the morning and at bedtime for 10 days     Dispense:  20 tablet     Refill:  0      New Prescriptions    PENICILLIN V POTASSIUM (VEETID) 500 MG TABLET    Take 1 tablet by mouth in the morning and at bedtime for 10 days        No follow-ups on file. Discussed use, benefits, and side effects of any prescribed medications. All patient questions were answered. Patient voiced understanding of care plan.    Patient was given educational materials - see patient instructions below. Patient Instructions   Exam does not correlate with TMJ due to location of pain and quality of teeth. Advised patient that he needs to set up appoint with dentist as soon as he can. Patient states he was set up with a dentist a while ago to take care of dental issues but then he was diagnosed with cancer and he was unable to go. Switched medication per request.  Patient requested steroids but provider decided this was not the best course of action given patient history. Ice area. Follow-up as needed    If high persistent fevers, chest pain, shortness of breath, or palpitations occur go to ER.       Electronically signed by MICHELA Chicas CNP on 9/8/2022 at 10:06 AM

## 2022-09-14 ENCOUNTER — HOSPITAL ENCOUNTER (OUTPATIENT)
Dept: INFUSION THERAPY | Age: 72
Discharge: HOME | End: 2022-09-14
Payer: MEDICARE

## 2022-09-14 DIAGNOSIS — C83.30 DIFFUSE LARGE B-CELL LYMPHOMA, UNSPECIFIED BODY REGION (HCC): ICD-10-CM

## 2022-09-14 LAB
BASOPHILS ABSOLUTE: 0.07 K/UL (ref 0.01–0.08)
BASOPHILS RELATIVE PERCENT: 1 % (ref 0.1–1.2)
EOSINOPHILS ABSOLUTE: 0.19 K/UL (ref 0.04–0.54)
EOSINOPHILS RELATIVE PERCENT: 2.7 % (ref 0.7–7)
HCT VFR BLD CALC: 44.8 % (ref 40.1–51)
HEMOGLOBIN: 14.4 G/DL (ref 13.7–17.5)
LYMPHOCYTES ABSOLUTE: 0.94 K/UL (ref 1.18–3.74)
LYMPHOCYTES RELATIVE PERCENT: 13.3 % (ref 19.3–53.1)
MCH RBC QN AUTO: 31.7 PG (ref 25.7–32.2)
MCHC RBC AUTO-ENTMCNC: 32.1 G/DL (ref 32.3–36.5)
MCV RBC AUTO: 98.7 FL (ref 79–92.2)
MONOCYTES ABSOLUTE: 0.75 K/UL (ref 0.24–0.82)
MONOCYTES RELATIVE PERCENT: 10.6 % (ref 4.7–12.5)
NEUTROPHILS ABSOLUTE: 5.12 K/UL (ref 1.56–6.13)
NEUTROPHILS RELATIVE PERCENT: 72.1 % (ref 34–71.1)
PDW BLD-RTO: 12.6 % (ref 11.6–14.4)
PLATELET # BLD: 179 K/UL (ref 163–337)
PMV BLD AUTO: 10.4 FL (ref 7.4–10.4)
RBC # BLD: 4.54 M/UL (ref 4.63–6.08)
WBC # BLD: 7.09 K/UL (ref 4.23–9.07)

## 2022-09-14 PROCEDURE — 85025 COMPLETE CBC W/AUTO DIFF WBC: CPT

## 2022-09-28 ENCOUNTER — HOSPITAL ENCOUNTER (OUTPATIENT)
Dept: INFUSION THERAPY | Age: 72
Discharge: HOME | End: 2022-09-28
Payer: MEDICARE

## 2022-09-28 DIAGNOSIS — Z45.2 ENCOUNTER FOR CENTRAL LINE CARE: Primary | ICD-10-CM

## 2022-09-28 DIAGNOSIS — C83.30 DIFFUSE LARGE B-CELL LYMPHOMA, UNSPECIFIED BODY REGION (HCC): ICD-10-CM

## 2022-09-28 LAB
HCT VFR BLD CALC: 38.2 % (ref 40.1–51)
HEMOGLOBIN: 12.8 G/DL (ref 13.7–17.5)
MCH RBC QN AUTO: 32.3 PG (ref 25.7–32.2)
MCHC RBC AUTO-ENTMCNC: 33.5 G/DL (ref 32.3–36.5)
MCV RBC AUTO: 96.5 FL (ref 79–92.2)
PDW BLD-RTO: 13.3 % (ref 11.6–14.4)
PLATELET # BLD: 153 K/UL (ref 163–337)
PMV BLD AUTO: 10.3 FL (ref 7.4–10.4)
RBC # BLD: 3.96 M/UL (ref 4.63–6.08)
WBC # BLD: 6.22 K/UL (ref 4.23–9.07)

## 2022-09-28 PROCEDURE — 85027 COMPLETE CBC AUTOMATED: CPT

## 2022-09-28 PROCEDURE — 6360000002 HC RX W HCPCS: Performed by: INTERNAL MEDICINE

## 2022-09-28 PROCEDURE — 2580000003 HC RX 258: Performed by: INTERNAL MEDICINE

## 2022-09-28 PROCEDURE — 96523 IRRIG DRUG DELIVERY DEVICE: CPT

## 2022-09-28 RX ORDER — SODIUM CHLORIDE 0.9 % (FLUSH) 0.9 %
5-40 SYRINGE (ML) INJECTION PRN
OUTPATIENT
Start: 2022-09-28

## 2022-09-28 RX ORDER — HEPARIN SODIUM (PORCINE) LOCK FLUSH IV SOLN 100 UNIT/ML 100 UNIT/ML
500 SOLUTION INTRAVENOUS PRN
OUTPATIENT
Start: 2022-09-28

## 2022-09-28 RX ORDER — SODIUM CHLORIDE 0.9 % (FLUSH) 0.9 %
5-40 SYRINGE (ML) INJECTION PRN
Status: DISCONTINUED | OUTPATIENT
Start: 2022-09-28 | End: 2022-09-29 | Stop reason: HOSPADM

## 2022-09-28 RX ORDER — HEPARIN SODIUM (PORCINE) LOCK FLUSH IV SOLN 100 UNIT/ML 100 UNIT/ML
500 SOLUTION INTRAVENOUS PRN
Status: DISCONTINUED | OUTPATIENT
Start: 2022-09-28 | End: 2022-09-29 | Stop reason: HOSPADM

## 2022-09-28 RX ADMIN — HEPARIN 500 UNITS: 100 SYRINGE at 15:00

## 2022-09-28 RX ADMIN — SODIUM CHLORIDE, PRESERVATIVE FREE 10 ML: 5 INJECTION INTRAVENOUS at 15:00

## 2022-10-21 ENCOUNTER — PATIENT MESSAGE (OUTPATIENT)
Dept: PRIMARY CARE CLINIC | Age: 72
End: 2022-10-21

## 2022-10-21 RX ORDER — AMLODIPINE BESYLATE 5 MG/1
5 TABLET ORAL DAILY
Qty: 30 TABLET | Refills: 3 | Status: SHIPPED | OUTPATIENT
Start: 2022-10-21 | End: 2022-11-03 | Stop reason: DRUGHIGH

## 2022-10-21 NOTE — TELEPHONE ENCOUNTER
I see a couple BP readings similar to this level of numbers in the chart as well from previous visits.

## 2022-11-03 ENCOUNTER — TELEPHONE (OUTPATIENT)
Dept: PRIMARY CARE CLINIC | Age: 72
End: 2022-11-03

## 2022-11-03 ENCOUNTER — NURSE ONLY (OUTPATIENT)
Dept: PRIMARY CARE CLINIC | Age: 72
End: 2022-11-03

## 2022-11-03 VITALS — DIASTOLIC BLOOD PRESSURE: 80 MMHG | SYSTOLIC BLOOD PRESSURE: 160 MMHG

## 2022-11-03 DIAGNOSIS — I10 ESSENTIAL HYPERTENSION: Primary | ICD-10-CM

## 2022-11-03 RX ORDER — AMLODIPINE BESYLATE 5 MG/1
10 TABLET ORAL DAILY
Qty: 60 TABLET | Refills: 3 | Status: SHIPPED | OUTPATIENT
Start: 2022-11-03

## 2022-11-03 RX ORDER — AMOXICILLIN 500 MG/1
CAPSULE ORAL
COMMUNITY
Start: 2022-10-22

## 2022-11-03 NOTE — TELEPHONE ENCOUNTER
Yes increase the amlodipine to 2 of the 5 mg to equal 10 mg. Monitor your blood pressure every a.m And once random during the day. Record them. The goal is top number under 140 and bottom number under 80.

## 2022-11-03 NOTE — TELEPHONE ENCOUNTER
BP in office today 160/80 - patient states top number is running high @ home as well.     Medication:  Amlodipine 5 mg daily     Asking if adjustment is needed - thank you

## 2022-11-28 ENCOUNTER — NURSE ONLY (OUTPATIENT)
Dept: PRIMARY CARE CLINIC | Age: 72
End: 2022-11-28

## 2022-11-28 VITALS — BODY MASS INDEX: 25.5 KG/M2 | HEIGHT: 67 IN | SYSTOLIC BLOOD PRESSURE: 160 MMHG | DIASTOLIC BLOOD PRESSURE: 80 MMHG

## 2022-11-28 DIAGNOSIS — I10 ESSENTIAL HYPERTENSION: Primary | ICD-10-CM

## 2022-11-29 NOTE — PROGRESS NOTES
MEDICAL ONCOLOGY PROGRESS NOTE    Pt Name: Derrick Loving  MRN: [id number]  YOB: 1950  Date of evaluation: 11/30/2022  History Obtained From:  patient, electronic medical record    HISTORY OF PRESENT ILLNESS:  The patient has a diagnosis of diffuse large B-cell lymphoma, ABC phenotype. Patient is currently status post completion of 6 cycles R-CHOP with G-CSF support. Treatment started 4/8/2022 and completed 7/28/2022. PET scan posttreatment showed complete resolution of his lymphoma. He is doing well. He has gained weight. Denies any peripheral adenopathy. Denies any B symptoms. Diagnosis  Diffuse large B-cell lymphoma, March 2022  c-Myc, BCL6-negative  BCL2 -positive rearrangement  ABC phenotype  Stage IIIB    Treatment summary  4/8/2022- 7/28/22 Completed R-CHOP + GCSF every 3 weeks x 6 cycles    Hematology history  Lorraine Retana was first seen by me on 3/24/2022. The patient was referred by his primary care provider for findings of generalized lymphadenopathy. The patient has had symptoms of weight loss, night sweats and low-grade fever. This seems has been going on for many months now. Patient had Covid 19 infection last year. He had CT scans that showed generalized adenopathy. 3/22/2022-CT soft tissue neck showed findings of cervical adenopathy identified, including a right level 3 node measuring 1.6 cm in greatest axial diameter (series 4-image 95). There is a left level 5 lymph node measuring 1.7 cm on axial image 102. Multiple enlarged left level 4 nodes, including a 2.3 cm node on axial image 114. Adenopathy extends down into the left supraclavicular fossa and there is bulky adenopathy also appreciated in the upper mediastinum. 3/22/2022-CT chest with contrast showed findings of mediastinal adenopathy. Subcarinal lymph nodes are present. Right hilar lymph nodes are present. Left para-aortic adenopathy is present in the abdomen. Concern for lymphoma.   3/22/2002-CT abdomen/pelvis showed spleen is enlarged measuring 14 cm craniocaudal dimension. Bilateral renal cysts including dominant 5.4 cm RIGHT upper pole exophytic renal cyst. 3 mm nonobstructing LEFT lower pole renal calculus. Prostate is mildly enlarged measuring 4.8 cm transverse dimension. Bulky retroperitoneal lymphadenopathy is noted. Noted conglomerate in the LEFT periaortic region on image 34 series 4 measures 6.0 x 3.8 cm. Enlarged gastrohepatic ligament lymph nodes with reference node on image 23 measuring 14 mm short axis dimension. No enlarged pelvic or inguinal lymph nodes identified. Bilateral chronic L5 pars defects with grade 1 anterolisthesis of L5 on S1. No acute osseous finding. 3/24/2022- (H), hemoglobin 10.1/MCV 93, platelets 252,511, WBC 8.7  3/24/2022-she was first seen by me. Recommended excisional biopsy left supraclavicular lymph node. 3/24/2022  B2M 2.8, Uric Acid 3.6,   3/28/2022 Left Cervical Lymphadenopathy (BHP): The dominant nodes are seen in the left level 4  and left level 5 neck as well as in the left supraclavicular fossa and upper mediastinum. Degenerative spinal stenosis. Atheromatous calcification in the carotid bulbs. 3/28/2022-excisional anibal biopsy. Final pathology pending. Preliminary results suggestive of high-grade lymphoma. Recommend allopurinol 300 mg p.o. daily. Recommend a PET scan and bone marrow biopsy. 4/4/2022-IHC studies from excisional node biopsy consistent with diffuse large B-cell lymphoma, ABC phenotype. BCL2, BCL6 and c-Myc rearrangement in process. Recommended R-CHOP x6 cycles. Started on allopurinol. 4/6/22 Bone marrow ([facility]): BONE MARROW: Normocellular (30-35% cellular) marrow with multilineage hematopoiesis. Negative for lymphoid infiltrates. Negative for dysplasia, no increase in blasts. Storage iron present, no ring sideroblasts. FLOW CYTOMETRY: No B-cell monoclonality and no T-cell aberrant antigenic expression.  The blasts are not increa we need to sed. CYTOGENETICS: Normal male karyotype. 4/7/22 PET CT SKULL BASE TO MID THIGH  Extensive lymphadenopathy consistent with lymphoma. Many of these nodes are extremely metabolically active including SUV measurements of greater than 40-50 in some of the nodes. There is extensive adenopathy in the lower cervical chains including level 3, 4 and 5 nodes. Supraclavicular lymphadenopathy is present with extension into the superior mediastinum. Both anterior and middle mediastinal adenopathy as well as right hilar adenopathy is noted within the chest. A right retrocrural node, gastrohepatic ligament nodes and bulky left para-aortic nodes are also present all demonstrating intense abnormal metabolic activity. 4/8/2022- Initiated R-CHOP + GCSF every 3 weeks x 6 cycles  5/12/2022 2D Echocardiogram Left ventricle normal chamber size and thickness  Preserved systolic function with estimated ejection fraction of 54%  However, GLS is slightly decreased especially the apex. This is more sensitive to predict chemotherapy induced cardiomyopathy then ejection  fraction  No regional wall motion abnormality Mitral annulus calcification with moderate degree of mitral leaflet  thickening and normal mobility  Bicuspid aortic valve with gradient of 10 mm. Judging from stroke-volume  index which is 27.7, which is reduced. This may represent low gradient, normal ejection fraction, low stroke-volume significant aortic stenosis. If patient is symptomatic, please refer to cardiology for ARIADNA  5/16/2022 Echo ARIADNA LVEF 55%. Mildly thickened aortic valve leaflets with preserved leaflet mobility. Moderate TR.  6/10/2022 CT Chest WO Contrast Chronic fibrosing interstitial lung disease - fibrotic NSIP versus probable UIP. Etiology could be drug induced. Few subcentimeter sized mediastinal lymph nodes are seen at bilateral upper paratracheal, bilateral lower paratracheal and subcarinal location.  Multiple small liver cyst.Cholelithiasis without cholecystitis. Right renal simple cyst.Degenerative changes in the spine. Multiple subcentimeter bilateral nonobstructive calyceal calcifications. Recommendation:Follow up as clinically indicated. 6/10/2022 CT Abd/Pelvis WO Contrast There is decrease in the size of retroperitoneal lymphadenopathy compared to prior study, suggestive of response to treatment. Cholelithiasis without evidence of cholecystitis. Multiple simple liver cysts. Recommendation: Follow up as clinically indicated. 6/10/2022 CT Soft Tissue Neck WO Contrast Few enlarge nodes are noted in left level IV. Few sub cm sized noted in level Ia and bilateral level Ib. Recommendation:Follow up as clinically indicated. 6/16/2022-completion 3 cycles R-CHOP. CT chest abdomen pelvis and soft tissue neck showed interval response to treatment. 7/7/2022-cycle #5 R-CHOP with 25% dose reduction of Adriamycin due to elevated LFTs 3-4 times. Normal bilirubin. Low-dose correction was increasing her cyclophosphamide. Messaged cardiology about amiodarone. I believe this is related to him being on amiodarone. 4/8/2022- 7/28/22 Completed R-CHOP + GCSF every 3 weeks x 6 cycles. 8/25/2022 PSA 0.82  8/30/2022 PET/CT Skull Base to Mid Thigh 8/30/2022 PET CT Skull Base to Mid Thigh Compared to prior PET/CT  there has been positive response to treatment. The largest measures approximately 3.9 mm in transverse diameter and 2.3 mm in AP diameter. Nonobstructing left renal stone measuring up to 3 mm in the lower pole unchanged. Lymphadenopathy has resolved or significantly decreased in size with non focal suspicious remaining hypermetabolic uptake appreciated. Lymphadenopathy in the left thyroid Umair has significantly decreased in size and is no longer hypermetabolic with the largest lymph node remaining measuring 1.0 x 2.5 cm.  8/31/2022-essentially, PET/CT scan showed complete resolution of prior hypermetabolic adenopathy.   This is consistent with complete imaging response. Recommended clinical/imaging surveillance. Past Medical History:    Past Medical History:   Diagnosis Date    Cancer ([address]     lymphoma    Chronic neck pain     Herniated disc, cervical     HTN (hypertension)        Past Surgical History:    Past Surgical History:   Procedure Laterality Date    LYMPH NODE BIOPSY      left side of neck    PORT SURGERY N/A 4/8/2022    SINGLE LUMEN PORT PLACEMENT WITH US & FLUORO performed by Dick Rocha MD at [address] [address] History:    Marital status:  Smoking status:Former smoker, Quit more 25 years ago  ETOH status:No   Resides:[place]    Family History:   Family History   Problem Relation Age of Onset    Cancer Mother         lymphoma    Heart Disease Father        Current Hospital Medications:    Current Outpatient Medications   Medication Sig Dispense Refill    amoxicillin (AMOXIL) 500 MG capsule TAKE 1 CAPSULE BY MOUTH 4 TIMES DAILY      amLODIPine (NORVASC) 5 MG tablet Take 2 tablets by mouth daily 60 tablet 3    nystatin (MYCOSTATIN) 477748 UNIT/GM ointment APPLY OINTMENT TOPICALLY TWICE DAILY      rivaroxaban (XARELTO) 20 MG TABS tablet Take 1 tablet by mouth daily (with breakfast) 30 tablet 5    lidocaine-prilocaine (EMLA) 2.5-2.5 % cream Apply topically to port area and cover with plastic wrap one hour prior to treatment.  1 each 1    turmeric 500 MG CAPS Take by mouth in the morning, at noon, and at bedtime       zinc gluconate 50 MG tablet Take 50 mg by mouth daily      ferrous sulfate (IRON 325) 325 (65 Fe) MG tablet Take 325 mg by mouth daily (with breakfast)      KRILL OIL PO Take by mouth      Red Yeast Rice Extract (RED YEAST RICE PO) Take by mouth      Probiotic Product (PROBIOTIC-10 PO) Take by mouth      Zn-Pyg Afri-Nettle-Saw Palmet (SAW PALMETTO COMPLEX PO) Take by mouth      Multiple Vitamins-Minerals (THERAPEUTIC MULTIVITAMIN-MINERALS) tablet Take 1 tablet by mouth daily      Vitamin D (CHOLECALCIFEROL) 1000 UNITS CAPS capsule Take 1,000 Units by mouth daily       niacin 500 MG CR capsule Take 500 mg by mouth nightly       dilTIAZem (CARDIZEM CD) 120 MG extended release capsule Take 1 capsule by mouth daily (Patient not taking: No sig reported) 30 capsule 5     No current facility-administered medications for this visit.      Facility-Administered Medications Ordered in Other Visits   Medication Dose Route Frequency Provider Last Rate Last Admin    sodium chloride flush 0.9 % injection 5-40 mL  5-40 mL IntraVENous PRN Lito Iglesias MD        heparin flush 100 UNIT/ML injection 500 Units  500 Units IntraCATHeter PRN Lito Iglesias MD           Allergies: No Known Allergies      Subjective   REVIEW OF SYSTEMS:   CONSTITUTIONAL: no fever, no night sweats,weakness, fatigue;  HEENT: no blurring of vision, no double vision, no hearing difficulty, no tinnitus, no ulceration, no dysplasia, no epistaxis;   LUNGS: no cough, no hemoptysis, no wheeze,  no shortness of breath;  CARDIOVASCULAR: no palpitation, no chest pain, no shortness of breath;  GI: no abdominal pain, no nausea, no vomiting, no diarrhea, no constipation;  KARINA: no dysuria, no hematuria, no frequency or urgency, no nephrolithiasis;  MUSCULOSKELETAL: chronic back pain,joint pain, no swelling, no stiffness;  ENDOCRINE: no polyuria, no polydipsia, no cold or heat intolerance;  HEMATOLOGY: no easy bruising or bleeding, no history of clotting disorder;  DERMATOLOGY: no skin rash, no eczema, no pruritus;  PSYCHIATRY: no depression, no anxiety, no panic attacks, no suicidal ideation, no homicidal ideation;  NEUROLOGY: no syncope, no seizures, no numbness or tingling of hands, numbness or tingling of feet, no paresis;      Objective   BP (!) 140/80   Pulse 98   Ht 5' 7\" (1.702 m)   Wt 167 lb (75.8 kg)   SpO2 99%   BMI 26.16 kg/m²   Wt Readings from Last 3 Encounters:   11/30/22 167 lb (75.8 kg)   09/08/22 162 lb 12.8 oz (73.8 kg) 08/31/22 163 lb (73.9 kg)         PHYSICAL EXAM:  CONSTITUTIONAL: Alert, appropriate, no acute distress  EYES: Non icteric, EOM intact, pupils equal round   ENT: Mucus membranes moist, no oral pharyngeal lesions, external inspection of ears and nose are normal.  NECK: Supple, no masses. No palpable thyroid mass  CHEST/LUNGS: CTA bilaterally, normal respiratory effort   CARDIOVASCULAR: RRR, no murmurs. No lower extremity edema  ABDOMEN: soft non-tender, active bowel sounds, no HSM. No palpable masses  EXTREMITIES: warm, full ROM in all 4 extremities, no focal weakness. SKIN: warm, dry with no rashes or lesions  LYMPH: No cervical, clavicular, axillary, or inguinal lymphadenopathy  NEUROLOGIC: follows commands, non focal     LABORATORY RESULTS REVIEWED/ANALYZED BY ME:  11/30/22 CBC  WBC 5.88  HGB 15.1    Neut 3.78    RADIOLOGY STUDIES REPORT REVIEWED/INTERPRETED BY ME:  None    ASSESSMENT:  #Diffuse large B-cell lymphoma, stage IIIB, ABC phenotype (c-Myc(-), BCL-2 (+), BCL6(-)rearrangement)  -3/28/2022-excisional node biopsy by Dr. Reza Hayward, [facility].  -Path consistent with ABC phenotype DLBCL: Lymphoma as per Dr. Jewell Garcia. -PET scan and bone marrow biopsy-ordered today on 4/4/2022.  -Persistent B symptoms.  - PET scan/bone marrow biopsy on 4/4/2022. (Negative for lymphoma involvement)  Extensive lymphadenopathy consistent with lymphoma. Many of these nodes are extremely metabolically active including SUV measurements of greater than 40-50 in some of the nodes. There is extensive adenopathy in the lower cervical chains including level 3, 4 and 5 nodes. Supraclavicular lymphadenopathy is present with extension into the superior mediastinum.  Both anterior and middle mediastinal adenopathy as well as right hilar adenopathy is noted within the chest. A right retrocrural node, gastrohepatic ligament nodes and bulky left para-aortic nodes are also present all demonstrating intense abnormal metabolic activity. .      Recommended:  -R-CHOP x6 cycles  G-CSF support  6/10/2022-CT neck/C/A/P-interval response to treatment after 3 cycles. 8/30/2022 PET/CT Skull Base to Mid Thigh 8/30/2022 PET CT Skull Base to Mid Thigh Compared to prior PET/CT  there has been positive response to treatment. The largest measures approximately 3.9 mm in transverse diameter and 2.3 mm in AP diameter. Nonobstructing left renal stone measuring up to 3 mm in the lower pole unchanged. Lymphadenopathy has resolved or significantly decreased in size with non focal suspicious remaining hypermetabolic uptake appreciated. Lymphadenopathy in the left thyroid Umair has significantly decreased in size and is no longer hypermetabolic with the largest lymph node remaining measuring 1.0 x 2.5 cm.  8/31/2022-essentially, PET/CT scan showed complete resolution of prior hypermetabolic adenopathy. This is consistent with complete imaging response. Recommended clinical/imaging surveillance. Recommended follow-up as per NCCN guidelines. Treatment related toxicity-fatigue, anemia, abnormal LFTs have resolved, neutropenia    Treatment related anemia  Lab Results   Component Value Date    WBC 5.88 11/30/2022    HGB 15.1 11/30/2022    HCT 45.9 11/30/2022    MCV 92.0 11/30/2022     (L) 11/30/2022     Lab Results   Component Value Date    NEUTROABS 3.78 11/30/2022     Pulmonary interstitial fibrosis-I reviewed the CT scan with the patient. He has complaints of short of breath on exertion. He has findings of interstitial fibrosis. I offered a pulmonology consultation but he wants to defer this at this time. Port care-port flush q 8 weeks    Immunization counseling-recommended flu, COVID and shingles vaccination.       PLAN:  RTC with MD 3 months after scans  Port flush every 8 weeks  Repeat CT Chest,Abdomen, Pelvis in March 2023  Continue follow-up with [id number] [facility]/Dr Jose Guzman  Consider Pulmonology referral in the future when treatments complete  Consider Flu, Covid vaccines, Shingles injection       I, Pretty Waters am pre charting  as Medical Assistant for Kelvin Angela MD. Electronically signed by Pretty Waters MA on 11/30/2022 at 3:04 PM CST. Shelea Baltazar am scribing as Medical Assistant for Kelvin Angela MD. Electronically signed by Pretty Waters MA on 11/30/2022 at 9:32 AM CST. I, Dr Abran Lucio, personally performed the services described in this documentation as scribed by Pretty Waters MA in my presence and is both accurate and complete. I have seen, examined and reviewed this patient medication list, appropriate labs and imaging studies. I reviewed relevant medical records and others physicians notes. I discussed the plans of care with the patient. I answered all the questions to the patients satisfaction. I have also reviewed the chief complaint (CC) and part of the history (History of Present Illness (HPI), Past Family Social History [facility]), or Review of Systems (ROS) and made changes when appropriated.        (Please note that portions of this note were completed with a voice recognition program. Efforts were made to edit the dictations but occasionally words are mis-transcribed.)  Electronically signed by Kelvin Angela MD on 11/30/2022 at 9:41 AM

## 2022-11-30 ENCOUNTER — OFFICE VISIT (OUTPATIENT)
Dept: PRIMARY CARE CLINIC | Age: 72
End: 2022-11-30
Payer: MEDICARE

## 2022-11-30 ENCOUNTER — OFFICE VISIT (OUTPATIENT)
Dept: HEMATOLOGY | Age: 72
End: 2022-11-30
Payer: MEDICARE

## 2022-11-30 ENCOUNTER — HOSPITAL ENCOUNTER (OUTPATIENT)
Dept: INFUSION THERAPY | Age: 72
Discharge: HOME | End: 2022-11-30
Payer: MEDICARE

## 2022-11-30 VITALS
BODY MASS INDEX: 26.84 KG/M2 | HEART RATE: 83 BPM | OXYGEN SATURATION: 97 % | TEMPERATURE: 97.1 F | DIASTOLIC BLOOD PRESSURE: 76 MMHG | HEIGHT: 67 IN | SYSTOLIC BLOOD PRESSURE: 140 MMHG | WEIGHT: 171 LBS

## 2022-11-30 VITALS
DIASTOLIC BLOOD PRESSURE: 80 MMHG | HEART RATE: 98 BPM | BODY MASS INDEX: 26.21 KG/M2 | SYSTOLIC BLOOD PRESSURE: 140 MMHG | WEIGHT: 167 LBS | OXYGEN SATURATION: 99 % | HEIGHT: 67 IN

## 2022-11-30 DIAGNOSIS — Z45.2 ENCOUNTER FOR CENTRAL LINE CARE: ICD-10-CM

## 2022-11-30 DIAGNOSIS — C83.32 DIFFUSE LARGE B-CELL LYMPHOMA OF INTRATHORACIC LYMPH NODES (HCC): Primary | ICD-10-CM

## 2022-11-30 DIAGNOSIS — C83.30 DIFFUSE LARGE B-CELL LYMPHOMA, UNSPECIFIED BODY REGION (HCC): Primary | ICD-10-CM

## 2022-11-30 DIAGNOSIS — C85.98 LYMPHOMA OF LYMPH NODES OF MULTIPLE SITES (HCC): ICD-10-CM

## 2022-11-30 DIAGNOSIS — Z71.89 CARE PLAN DISCUSSED WITH PATIENT: ICD-10-CM

## 2022-11-30 DIAGNOSIS — Z71.85 VACCINE COUNSELING: ICD-10-CM

## 2022-11-30 DIAGNOSIS — B37.0 ORAL YEAST INFECTION: Primary | ICD-10-CM

## 2022-11-30 LAB
BASOPHILS ABSOLUTE: 0.05 K/UL (ref 0.01–0.08)
BASOPHILS RELATIVE PERCENT: 0.9 % (ref 0.1–1.2)
EOSINOPHILS ABSOLUTE: 0.08 K/UL (ref 0.04–0.54)
EOSINOPHILS RELATIVE PERCENT: 1.4 % (ref 0.7–7)
HCT VFR BLD CALC: 45.9 % (ref 40.1–51)
HEMOGLOBIN: 15.1 G/DL (ref 13.7–17.5)
LYMPHOCYTES ABSOLUTE: 1.3 K/UL (ref 1.18–3.74)
LYMPHOCYTES RELATIVE PERCENT: 22.1 % (ref 19.3–53.1)
MCH RBC QN AUTO: 30.3 PG (ref 25.7–32.2)
MCHC RBC AUTO-ENTMCNC: 32.9 G/DL (ref 32.3–36.5)
MCV RBC AUTO: 92 FL (ref 79–92.2)
MONOCYTES ABSOLUTE: 0.65 K/UL (ref 0.24–0.82)
MONOCYTES RELATIVE PERCENT: 11.1 % (ref 4.7–12.5)
NEUTROPHILS ABSOLUTE: 3.78 K/UL (ref 1.56–6.13)
NEUTROPHILS RELATIVE PERCENT: 64.2 % (ref 34–71.1)
PDW BLD-RTO: 14.3 % (ref 11.6–14.4)
PLATELET # BLD: 127 K/UL (ref 163–337)
PMV BLD AUTO: 10.7 FL (ref 7.4–10.4)
RBC # BLD: 4.99 M/UL (ref 4.63–6.08)
WBC # BLD: 5.88 K/UL (ref 4.23–9.07)

## 2022-11-30 PROCEDURE — 99213 OFFICE O/P EST LOW 20 MIN: CPT | Performed by: NURSE PRACTITIONER

## 2022-11-30 PROCEDURE — 2580000003 HC RX 258: Performed by: INTERNAL MEDICINE

## 2022-11-30 PROCEDURE — 6360000002 HC RX W HCPCS: Performed by: INTERNAL MEDICINE

## 2022-11-30 PROCEDURE — G8427 DOCREV CUR MEDS BY ELIG CLIN: HCPCS | Performed by: NURSE PRACTITIONER

## 2022-11-30 PROCEDURE — 3074F SYST BP LT 130 MM HG: CPT | Performed by: NURSE PRACTITIONER

## 2022-11-30 PROCEDURE — 96523 IRRIG DRUG DELIVERY DEVICE: CPT

## 2022-11-30 PROCEDURE — 1123F ACP DISCUSS/DSCN MKR DOCD: CPT | Performed by: NURSE PRACTITIONER

## 2022-11-30 PROCEDURE — 3017F COLORECTAL CA SCREEN DOC REV: CPT | Performed by: INTERNAL MEDICINE

## 2022-11-30 PROCEDURE — 36415 COLL VENOUS BLD VENIPUNCTURE: CPT

## 2022-11-30 PROCEDURE — 1036F TOBACCO NON-USER: CPT | Performed by: NURSE PRACTITIONER

## 2022-11-30 PROCEDURE — G8417 CALC BMI ABV UP PARAM F/U: HCPCS | Performed by: INTERNAL MEDICINE

## 2022-11-30 PROCEDURE — 3078F DIAST BP <80 MM HG: CPT | Performed by: NURSE PRACTITIONER

## 2022-11-30 PROCEDURE — 99213 OFFICE O/P EST LOW 20 MIN: CPT | Performed by: INTERNAL MEDICINE

## 2022-11-30 PROCEDURE — G8484 FLU IMMUNIZE NO ADMIN: HCPCS | Performed by: NURSE PRACTITIONER

## 2022-11-30 PROCEDURE — 1123F ACP DISCUSS/DSCN MKR DOCD: CPT | Performed by: INTERNAL MEDICINE

## 2022-11-30 PROCEDURE — 99212 OFFICE O/P EST SF 10 MIN: CPT

## 2022-11-30 PROCEDURE — 85025 COMPLETE CBC W/AUTO DIFF WBC: CPT

## 2022-11-30 PROCEDURE — 3017F COLORECTAL CA SCREEN DOC REV: CPT | Performed by: NURSE PRACTITIONER

## 2022-11-30 PROCEDURE — 3074F SYST BP LT 130 MM HG: CPT | Performed by: INTERNAL MEDICINE

## 2022-11-30 PROCEDURE — G8428 CUR MEDS NOT DOCUMENT: HCPCS | Performed by: INTERNAL MEDICINE

## 2022-11-30 PROCEDURE — 3078F DIAST BP <80 MM HG: CPT | Performed by: INTERNAL MEDICINE

## 2022-11-30 PROCEDURE — G8417 CALC BMI ABV UP PARAM F/U: HCPCS | Performed by: NURSE PRACTITIONER

## 2022-11-30 PROCEDURE — G8484 FLU IMMUNIZE NO ADMIN: HCPCS | Performed by: INTERNAL MEDICINE

## 2022-11-30 PROCEDURE — 1036F TOBACCO NON-USER: CPT | Performed by: INTERNAL MEDICINE

## 2022-11-30 RX ORDER — HEPARIN SODIUM (PORCINE) LOCK FLUSH IV SOLN 100 UNIT/ML 100 UNIT/ML
500 SOLUTION INTRAVENOUS PRN
Status: DISCONTINUED | OUTPATIENT
Start: 2022-11-30 | End: 2022-12-01 | Stop reason: HOSPADM

## 2022-11-30 RX ORDER — HEPARIN SODIUM (PORCINE) LOCK FLUSH IV SOLN 100 UNIT/ML 100 UNIT/ML
500 SOLUTION INTRAVENOUS PRN
OUTPATIENT
Start: 2022-11-30

## 2022-11-30 RX ORDER — SODIUM CHLORIDE 0.9 % (FLUSH) 0.9 %
5-40 SYRINGE (ML) INJECTION PRN
OUTPATIENT
Start: 2022-11-30

## 2022-11-30 RX ORDER — SODIUM CHLORIDE 0.9 % (FLUSH) 0.9 %
5-40 SYRINGE (ML) INJECTION PRN
Status: DISCONTINUED | OUTPATIENT
Start: 2022-11-30 | End: 2022-12-01 | Stop reason: HOSPADM

## 2022-11-30 RX ORDER — CHLORHEXIDINE GLUCONATE 0.12 MG/ML
RINSE ORAL
COMMUNITY
Start: 2022-11-12 | End: 2022-12-07 | Stop reason: ALTCHOICE

## 2022-11-30 RX ADMIN — SODIUM CHLORIDE, PRESERVATIVE FREE 20 ML: 5 INJECTION INTRAVENOUS at 09:49

## 2022-11-30 RX ADMIN — HEPARIN 500 UNITS: 100 SYRINGE at 09:49

## 2022-11-30 NOTE — PATIENT INSTRUCTIONS
Nystatin swish and swallow  Monitor your blood pressure every a.m And once random during the day. Record them. The goal is top number under 140 and bottom number under 80.

## 2022-11-30 NOTE — PROGRESS NOTES
[facility] PHYSICIAN SERVICES  [facility]  [address]  [address] [address]  Dept: [phone]  Dept Fax: [phone]  Loc: [phone]    Inge Palmer is a 67 y.o. male who presents today for his medical conditions/complaints as noted below. Inge Palmer is c/o of Other (Black Tongue - first time he noticed was Saturday. Taste is a little off. No fever. No chills. Patient was told it's a yeast infection. No medicine given.  ) and Hypertension (Patient stopped cardizem 8/2/22. Just taking amlodipine.  )        HPI:     HPI   Chief Complaint   Patient presents with    Other     Black Tongue - first time he noticed was Saturday. Taste is a little off. No fever. No chills. Patient was told it's a yeast infection. No medicine given. Hypertension     Patient stopped cardizem 8/2/22. Just taking amlodipine. He did not think he needed the Cardizem so he stopped taking it he been monitoring his blood pressure and he said it is just been fine on the amlodipine. He does see cardiology and he has an upcoming appointment with them.   Past Medical History:   Diagnosis Date    Cancer [facility])     lymphoma    Chronic neck pain     Herniated disc, cervical     HTN (hypertension)       Past Surgical History:   Procedure Laterality Date    DENTAL SURGERY      LYMPH NODE BIOPSY      left side of neck    PORT SURGERY N/A 04/08/2022    SINGLE LUMEN PORT PLACEMENT WITH US & FLUORO performed by Kanika Newman MD at [address] [place] [address] 12/7/2022 11/30/2022 11/30/2022 11/30/2022 11/28/2022 07/8/1624   SYSTOLIC [phone] 723   DIASTOLIC 78 76 90 80 80 80   Pulse 83 - 83 98 - -   Temp - - 97.1 - - -   Resp - - - - - -   SpO2 97 - 97 99 - -   Weight 172 lb - 171 lb 167 lb - -   Height 5' 7\" - 5' 7\" 5' 7\" 5' 7\" -   Body mass index 26.94 kg/m2 - 26.78 kg/m2 26.15 kg/m2 - -   Pain Level - - - - - -   Some recent data might be hidden       Family History   Problem Relation Age of Onset Cancer Mother         lymphoma    Heart Disease Father        Social History     Tobacco Use    Smoking status: Former     Packs/day: 2.00     Years: 15.00     Pack years: 30.00     Types: Cigarettes     Quit date: [address]     Years since quittin.9    Smokeless tobacco: Never   Substance Use Topics    Alcohol use: Yes     Comment: Occasional       Current Outpatient Medications on File Prior to Visit   Medication Sig Dispense Refill    amLODIPine (NORVASC) 5 MG tablet Take 2 tablets by mouth daily 60 tablet 3    nystatin (MYCOSTATIN) 975903 UNIT/GM ointment APPLY OINTMENT TOPICALLY TWICE DAILY      rivaroxaban (XARELTO) 20 MG TABS tablet Take 1 tablet by mouth daily (with breakfast) 30 tablet 5    lidocaine-prilocaine (EMLA) 2.5-2.5 % cream Apply topically to port area and cover with plastic wrap one hour prior to treatment. 1 each 1    turmeric 500 MG CAPS Take by mouth in the morning, at noon, and at bedtime       zinc gluconate 50 MG tablet Take 50 mg by mouth daily      ferrous sulfate (IRON 325) 325 (65 Fe) MG tablet Take 325 mg by mouth daily (with breakfast)      KRILL OIL PO Take by mouth      Red Yeast Rice Extract (RED YEAST RICE PO) Take by mouth      Probiotic Product (PROBIOTIC-10 PO) Take by mouth      Zn-Pyg Afri-Nettle-Saw Palmet (SAW PALMETTO COMPLEX PO) Take by mouth      Multiple Vitamins-Minerals (THERAPEUTIC MULTIVITAMIN-MINERALS) tablet Take 1 tablet by mouth daily      Vitamin D (CHOLECALCIFEROL) 1000 UNITS CAPS capsule Take 1,000 Units by mouth daily       niacin 500 MG CR capsule Take 500 mg by mouth nightly        No current facility-administered medications on file prior to visit.      No Known Allergies    Health Maintenance   Topic Date Due    COVID-19 Vaccine (1) Never done    Pneumococcal 65+ years Vaccine (1 - PCV) Never done    Hepatitis C screen  Never done    DTaP/Tdap/Td vaccine (1 - Tdap) Never done    Shingles vaccine (1 of 2) Never done    Flu vaccine (1) Never done    Annual Wellness Visit (AWV)  10/09/2022    Depression Screen  08/25/2023    Colorectal Cancer Screen  03/17/2026    Lipids  08/25/2027    AAA screen  Completed    Hepatitis A vaccine  Aged Out    Hib vaccine  Aged Out    Meningococcal (ACWY) vaccine  Aged Out       Subjective:      Review of Systems   Constitutional:  Positive for fatigue. HENT:          Tongue pain and discolor   Psychiatric/Behavioral: Negative. Objective:     Physical Exam  Vitals and nursing note reviewed. Constitutional:       Appearance: Normal appearance. He is well-developed. HENT:      Head: Normocephalic. Nose: Nose normal.      Mouth/Throat:     Cardiovascular:      Rate and Rhythm: Normal rate and regular rhythm. Heart sounds: Normal heart sounds. Pulmonary:      Effort: Pulmonary effort is normal.      Breath sounds: Normal breath sounds. Skin:     General: Skin is warm and dry. Capillary Refill: Capillary refill takes less than 2 seconds. Neurological:      General: No focal deficit present. Mental Status: He is alert and oriented to person, place, and time. Psychiatric:         Mood and Affect: Mood normal.         Behavior: Behavior normal.         Thought Content: Thought content normal.         Judgment: Judgment normal.     BP (!) 140/76   Pulse 83   Temp 97.1 °F (36.2 °C)   Ht 5' 7\" (1.702 m)   Wt 171 lb (77.6 kg)   SpO2 97%   BMI 26.78 kg/m²     Assessment:       Diagnosis Orders   1. Oral yeast infection              Plan:   More than 50% of the time was spent counseling and coordinating care for a total time of 25min face to face. PDMP Monitoring:    Last PDMP Bart as Reviewed:  Review User Review Instant Review Result            Urine Drug Screenings (1 yr)    No resulted procedures found. Medication Contract and Consent for Opioid Use Documents Filed        No documents found                     Patient given educational materials -see patient instructions.   Discussed use, benefit, and side effects of prescribed medications. All patient questions answered. Pt voiced understanding. Reviewed health maintenance. Instructed to continue currentmedications, diet and exercise. Patient agreed with treatment plan. Follow up as directed. MEDICATIONS:  Orders Placed This Encounter   Medications    nystatin (MYCOSTATIN) 034824 UNIT/ML suspension     Sig: Take 5 mLs by mouth 4 times daily for 10 days Retain in mouth as long as possible, swish, swallow     Dispense:  200 mL     Refill:  0         ORDERS:  No orders of the defined types were placed in this encounter. Follow-up:  No follow-ups on file. PATIENT INSTRUCTIONS:  Patient Instructions   Nystatin swish and swallow  Monitor your blood pressure every a.m And once random during the day. Record them. The goal is top number under 140 and bottom number under 80. Electronically signed by MICHELA Otero CNP on 12/8/2022 at 5:06 PM    EMR Dragon/transcription disclaimer:  Much of thisencounter note is electronic transcription/translation of spoken language to printed texts. The electronic translation of spoken language may be erroneous, or at times, nonsensical words or phrases may be inadvertentlytranscribed.   Although I have reviewed the note for such errors, some may still exist.

## 2022-12-07 ENCOUNTER — PATIENT MESSAGE (OUTPATIENT)
Dept: PRIMARY CARE CLINIC | Age: 72
End: 2022-12-07

## 2022-12-07 ENCOUNTER — OFFICE VISIT (OUTPATIENT)
Dept: CARDIOLOGY CLINIC | Age: 72
End: 2022-12-07
Payer: MEDICARE

## 2022-12-07 VITALS
HEART RATE: 83 BPM | BODY MASS INDEX: 27 KG/M2 | DIASTOLIC BLOOD PRESSURE: 78 MMHG | HEIGHT: 67 IN | SYSTOLIC BLOOD PRESSURE: 136 MMHG | OXYGEN SATURATION: 97 % | WEIGHT: 172 LBS

## 2022-12-07 DIAGNOSIS — I48.92 ATRIAL FLUTTER WITH RAPID VENTRICULAR RESPONSE (HCC): Primary | ICD-10-CM

## 2022-12-07 PROCEDURE — 1036F TOBACCO NON-USER: CPT | Performed by: INTERNAL MEDICINE

## 2022-12-07 PROCEDURE — 99213 OFFICE O/P EST LOW 20 MIN: CPT | Performed by: INTERNAL MEDICINE

## 2022-12-07 PROCEDURE — 93000 ELECTROCARDIOGRAM COMPLETE: CPT | Performed by: INTERNAL MEDICINE

## 2022-12-07 PROCEDURE — G8417 CALC BMI ABV UP PARAM F/U: HCPCS | Performed by: INTERNAL MEDICINE

## 2022-12-07 PROCEDURE — G8484 FLU IMMUNIZE NO ADMIN: HCPCS | Performed by: INTERNAL MEDICINE

## 2022-12-07 PROCEDURE — 1123F ACP DISCUSS/DSCN MKR DOCD: CPT | Performed by: INTERNAL MEDICINE

## 2022-12-07 PROCEDURE — 3017F COLORECTAL CA SCREEN DOC REV: CPT | Performed by: INTERNAL MEDICINE

## 2022-12-07 PROCEDURE — G8427 DOCREV CUR MEDS BY ELIG CLIN: HCPCS | Performed by: INTERNAL MEDICINE

## 2022-12-07 PROCEDURE — 3078F DIAST BP <80 MM HG: CPT | Performed by: INTERNAL MEDICINE

## 2022-12-07 PROCEDURE — 3074F SYST BP LT 130 MM HG: CPT | Performed by: INTERNAL MEDICINE

## 2022-12-07 NOTE — PROGRESS NOTES
Gail Loco is a 67 y.o. male presents with paroxysmal atrial fibrillation. He said his wife was sick recently and he was off his medications for a few days and never restarted his diltiazem. His blood pressure and heart rate have been fine. He has not had any atrial fibrillation. Review of Systems   Constitutional: Negative for fever, chills, diaphoresis, activity change, appetite change, fatigue and unexpected weight change. Eyes: Negative for photophobia, pain, redness and visual disturbance. Respiratory: Negative for apnea, cough, chest tightness, shortness of breath, wheezing and stridor. Cardiovascular: Negative for chest pain, palpitations and leg swelling. Gastrointestinal: Negative for abdominal distention. Genitourinary: Negative for dysuria, urgency and frequency. Musculoskeletal: Negative for myalgias, arthralgias and gait problem. Skin: Negative for color change, pallor, rash and wound. Neurological: Negative for dizziness, tremors, speech difficulty, weakness and numbness. Hematological: Does not bruise/bleed easily. Psychiatric/Behavioral: Negative.           Social History     Socioeconomic History    Marital status:      Spouse name: Not on file    Number of children: Not on file    Years of education: Not on file    Highest education level: Not on file   Occupational History    Not on file   Tobacco Use    Smoking status: Former     Packs/day: 2.00     Years: 15.00     Pack years: 30.00     Types: Cigarettes     Quit date: 26     Years since quittin.9    Smokeless tobacco: Never   Vaping Use    Vaping Use: Never used   Substance and Sexual Activity    Alcohol use: Yes     Comment: Occasional     Drug use: No    Sexual activity: Not on file   Other Topics Concern    Not on file   Social History Narrative    Not on file     Social Determinants of Health     Financial Resource Strain: Low Risk     Difficulty of Paying Living Expenses: Not hard at all Food Insecurity: No Food Insecurity    Worried About Running Out of Food in the Last Year: Never true    Ran Out of Food in the Last Year: Never true   Transportation Needs: Not on file   Physical Activity: Not on file   Stress: Not on file   Social Connections: Not on file   Intimate Partner Violence: Not on file   Housing Stability: Not on file       No Known Allergies      Current Outpatient Medications:     nystatin (MYCOSTATIN) 512956 UNIT/ML suspension, Take 5 mLs by mouth 4 times daily for 10 days Retain in mouth as long as possible, swish, swallow, Disp: 200 mL, Rfl: 0    amLODIPine (NORVASC) 5 MG tablet, Take 2 tablets by mouth daily, Disp: 60 tablet, Rfl: 3    nystatin (MYCOSTATIN) 713934 UNIT/GM ointment, APPLY OINTMENT TOPICALLY TWICE DAILY, Disp: , Rfl:     rivaroxaban (XARELTO) 20 MG TABS tablet, Take 1 tablet by mouth daily (with breakfast), Disp: 30 tablet, Rfl: 5    lidocaine-prilocaine (EMLA) 2.5-2.5 % cream, Apply topically to port area and cover with plastic wrap one hour prior to treatment. , Disp: 1 each, Rfl: 1    turmeric 500 MG CAPS, Take by mouth in the morning, at noon, and at bedtime , Disp: , Rfl:     zinc gluconate 50 MG tablet, Take 50 mg by mouth daily, Disp: , Rfl:     ferrous sulfate (IRON 325) 325 (65 Fe) MG tablet, Take 325 mg by mouth daily (with breakfast), Disp: , Rfl:     KRILL OIL PO, Take by mouth, Disp: , Rfl:     Red Yeast Rice Extract (RED YEAST RICE PO), Take by mouth, Disp: , Rfl:     Probiotic Product (PROBIOTIC-10 PO), Take by mouth, Disp: , Rfl:     Zn-Pyg Afri-Nettle-Saw Palmet (SAW PALMETTO COMPLEX PO), Take by mouth, Disp: , Rfl:     Multiple Vitamins-Minerals (THERAPEUTIC MULTIVITAMIN-MINERALS) tablet, Take 1 tablet by mouth daily, Disp: , Rfl:     Vitamin D (CHOLECALCIFEROL) 1000 UNITS CAPS capsule, Take 1,000 Units by mouth daily , Disp: , Rfl:     niacin 500 MG CR capsule, Take 500 mg by mouth nightly , Disp: , Rfl:     PE:  Vitals:    12/07/22 0851   BP: 136/78   Pulse: 83   SpO2: 97%   Weight: 172 lb (78 kg)   Height: 5' 7\" (1.702 m)       Estimated body mass index is 26.94 kg/m² as calculated from the following:    Height as of this encounter: 5' 7\" (1.702 m). Weight as of this encounter: 172 lb (78 kg). Constitutional: He is oriented to person, place, and time. He appears well-developed and well-nourished in no acute distress. Neck:  Neck supple without JVD present. No trachea deviation present. No thyromegaly present. Eyes:Conjunctivae and EOM are normal. Pupils equal and reactive to light. ENT:Hearing appears normal.conjunctiva and lids are normal, ears and nose appear normal.  Cardiovascular: Normal rate, S1-S2 regular rhythm, normal heart sounds. 2 out of 6 holosystolic murmur ascultated. No gallop and no friction rub. No carotid bruits. No peripheral edema. Pulmonary/Chest:  Lungs clear to auscultation bilaterally without evidence of respiratory distress. He without wheezes. He without rales or ronchi. Musculoskeletal: Normal range of motion. Gait is normal  Head is normocephalic and atraumatic. Skin: Skin is warm and dry without rash or pallor. Psychiatric:He is alert and oriented to person, place, and time. He has a normal mood and affect. His behavior is normal. Thought content normal.       Lab Results   Component Value Date/Time    CREATININE 0.8 08/25/2022 08:55 AM    CREATININE 0.8 07/28/2022 08:38 AM    CREATININE 1.0 07/22/2022 09:53 AM    HGB 15.1 11/30/2022 09:16 AM    HGB 12.8 09/28/2022 03:17 PM    HGB 14.4 09/14/2022 11:27 AM    PROBNP 598 05/16/2022 05:05 PM           ECG 12/07/22    Sinus rhythm         Assessment, Recommendations, & Plan:  67 y.o. male with paroxysmal atrial fibrillation. We discussed the issues and I reminded him that he does not have to be on diltiazem but he needs something for rate control and for A. fib suppression because when he does develop A. fib his rates will be high.   We discussed that I could stop the amlodipine and put him on a higher dose of diltiazem for blood pressure control if he wishes but that the amlodipine itself would not help control the rate. Patient is to think this over and talk with his wife about it so he will call me and let me know what his decision is. Disposition - RTC in 6 weeks months or sooner if needed      Please do not hesitate to contact me for any questions or concerns. Dr. Rohini Cruz.  [place]  Electrophysiology and Cardiology  [facility] [facility] [place], Cardiology  [phone]

## 2022-12-12 RX ORDER — AMLODIPINE BESYLATE 10 MG/1
10 TABLET ORAL DAILY
Qty: 90 TABLET | Refills: 1 | Status: SHIPPED | OUTPATIENT
Start: 2022-12-12 | End: 2022-12-14 | Stop reason: ALTCHOICE

## 2022-12-14 RX ORDER — DILTIAZEM HYDROCHLORIDE 240 MG/1
240 CAPSULE, COATED, EXTENDED RELEASE ORAL DAILY
Qty: 30 CAPSULE | Refills: 5 | Status: SHIPPED | OUTPATIENT
Start: 2022-12-14

## 2022-12-28 RX ORDER — RIVAROXABAN 20 MG/1
TABLET, FILM COATED ORAL
Qty: 30 TABLET | Refills: 5 | Status: SHIPPED | OUTPATIENT
Start: 2022-12-28

## 2023-01-25 ENCOUNTER — HOSPITAL ENCOUNTER (OUTPATIENT)
Dept: INFUSION THERAPY | Age: 73
Discharge: HOME | End: 2023-01-25
Payer: MEDICARE

## 2023-01-25 DIAGNOSIS — Z45.2 ENCOUNTER FOR CENTRAL LINE CARE: Primary | ICD-10-CM

## 2023-01-25 PROCEDURE — 2580000003 HC RX 258: Performed by: INTERNAL MEDICINE

## 2023-01-25 PROCEDURE — 6360000002 HC RX W HCPCS: Performed by: INTERNAL MEDICINE

## 2023-01-25 PROCEDURE — 96523 IRRIG DRUG DELIVERY DEVICE: CPT

## 2023-01-25 RX ORDER — HEPARIN SODIUM (PORCINE) LOCK FLUSH IV SOLN 100 UNIT/ML 100 UNIT/ML
500 SOLUTION INTRAVENOUS PRN
OUTPATIENT
Start: 2023-01-25

## 2023-01-25 RX ORDER — SODIUM CHLORIDE 0.9 % (FLUSH) 0.9 %
5-40 SYRINGE (ML) INJECTION PRN
OUTPATIENT
Start: 2023-01-25

## 2023-01-25 RX ORDER — SODIUM CHLORIDE 0.9 % (FLUSH) 0.9 %
5-40 SYRINGE (ML) INJECTION PRN
Status: DISCONTINUED | OUTPATIENT
Start: 2023-01-25 | End: 2023-01-26 | Stop reason: HOSPADM

## 2023-01-25 RX ORDER — HEPARIN SODIUM (PORCINE) LOCK FLUSH IV SOLN 100 UNIT/ML 100 UNIT/ML
500 SOLUTION INTRAVENOUS PRN
Status: DISCONTINUED | OUTPATIENT
Start: 2023-01-25 | End: 2023-01-26 | Stop reason: HOSPADM

## 2023-01-25 RX ADMIN — Medication 500 UNITS: at 09:17

## 2023-01-25 RX ADMIN — Medication 10 ML: at 09:17

## 2023-02-27 SDOH — ECONOMIC STABILITY: FOOD INSECURITY: WITHIN THE PAST 12 MONTHS, THE FOOD YOU BOUGHT JUST DIDN'T LAST AND YOU DIDN'T HAVE MONEY TO GET MORE.: NEVER TRUE

## 2023-02-27 SDOH — ECONOMIC STABILITY: FOOD INSECURITY: WITHIN THE PAST 12 MONTHS, YOU WORRIED THAT YOUR FOOD WOULD RUN OUT BEFORE YOU GOT MONEY TO BUY MORE.: NEVER TRUE

## 2023-02-27 SDOH — ECONOMIC STABILITY: INCOME INSECURITY: HOW HARD IS IT FOR YOU TO PAY FOR THE VERY BASICS LIKE FOOD, HOUSING, MEDICAL CARE, AND HEATING?: NOT HARD AT ALL

## 2023-02-27 SDOH — ECONOMIC STABILITY: HOUSING INSECURITY
IN THE LAST 12 MONTHS, WAS THERE A TIME WHEN YOU DID NOT HAVE A STEADY PLACE TO SLEEP OR SLEPT IN A SHELTER (INCLUDING NOW)?: NO

## 2023-02-27 SDOH — ECONOMIC STABILITY: TRANSPORTATION INSECURITY
IN THE PAST 12 MONTHS, HAS LACK OF TRANSPORTATION KEPT YOU FROM MEETINGS, WORK, OR FROM GETTING THINGS NEEDED FOR DAILY LIVING?: NO

## 2023-02-28 RX ORDER — DILTIAZEM HYDROCHLORIDE 240 MG/1
240 CAPSULE, COATED, EXTENDED RELEASE ORAL DAILY
Qty: 90 CAPSULE | Refills: 3 | Status: SHIPPED | OUTPATIENT
Start: 2023-02-28

## 2023-03-02 ENCOUNTER — OFFICE VISIT (OUTPATIENT)
Dept: PRIMARY CARE CLINIC | Age: 73
End: 2023-03-02
Payer: MEDICARE

## 2023-03-02 VITALS
HEART RATE: 72 BPM | HEIGHT: 67 IN | DIASTOLIC BLOOD PRESSURE: 70 MMHG | BODY MASS INDEX: 27.15 KG/M2 | OXYGEN SATURATION: 98 % | TEMPERATURE: 96.6 F | SYSTOLIC BLOOD PRESSURE: 132 MMHG | RESPIRATION RATE: 16 BRPM | WEIGHT: 173 LBS

## 2023-03-02 DIAGNOSIS — R71.0 DECREASED HEMOGLOBIN: ICD-10-CM

## 2023-03-02 DIAGNOSIS — I48.91 ATRIAL FIBRILLATION, UNSPECIFIED TYPE (HCC): ICD-10-CM

## 2023-03-02 DIAGNOSIS — C85.98 LYMPHOMA OF LYMPH NODES OF MULTIPLE REGIONS, UNSPECIFIED LYMPHOMA TYPE (HCC): ICD-10-CM

## 2023-03-02 DIAGNOSIS — I10 ESSENTIAL HYPERTENSION: Primary | ICD-10-CM

## 2023-03-02 LAB
ALBUMIN SERPL-MCNC: 4.7 G/DL (ref 3.5–5.2)
ALP BLD-CCNC: 87 U/L (ref 40–130)
ALT SERPL-CCNC: 17 U/L (ref 5–41)
ANION GAP SERPL CALCULATED.3IONS-SCNC: 9 MMOL/L (ref 7–19)
AST SERPL-CCNC: 21 U/L (ref 5–40)
BASOPHILS ABSOLUTE: 0 K/UL (ref 0–0.2)
BASOPHILS RELATIVE PERCENT: 0.8 % (ref 0–1)
BILIRUB SERPL-MCNC: 0.4 MG/DL (ref 0.2–1.2)
BUN BLDV-MCNC: 22 MG/DL (ref 8–23)
CALCIUM SERPL-MCNC: 9.7 MG/DL (ref 8.8–10.2)
CHLORIDE BLD-SCNC: 101 MMOL/L (ref 98–111)
CO2: 29 MMOL/L (ref 22–29)
CREAT SERPL-MCNC: 0.8 MG/DL (ref 0.5–1.2)
EOSINOPHILS ABSOLUTE: 0.1 K/UL (ref 0–0.6)
EOSINOPHILS RELATIVE PERCENT: 2.5 % (ref 0–5)
GFR SERPL CREATININE-BSD FRML MDRD: >60 ML/MIN/{1.73_M2}
GLUCOSE BLD-MCNC: 98 MG/DL (ref 74–109)
HCT VFR BLD CALC: 49 % (ref 42–52)
HEMOGLOBIN: 16.3 G/DL (ref 14–18)
IMMATURE GRANULOCYTES #: 0 K/UL
IRON SATURATION: 39 % (ref 14–50)
IRON: 103 UG/DL (ref 59–158)
LYMPHOCYTES ABSOLUTE: 0.8 K/UL (ref 1.1–4.5)
LYMPHOCYTES RELATIVE PERCENT: 17.4 % (ref 20–40)
MCH RBC QN AUTO: 31.6 PG (ref 27–31)
MCHC RBC AUTO-ENTMCNC: 33.3 G/DL (ref 33–37)
MCV RBC AUTO: 95 FL (ref 80–94)
MONOCYTES ABSOLUTE: 0.5 K/UL (ref 0–0.9)
MONOCYTES RELATIVE PERCENT: 10.4 % (ref 0–10)
NEUTROPHILS ABSOLUTE: 3.2 K/UL (ref 1.5–7.5)
NEUTROPHILS RELATIVE PERCENT: 68.3 % (ref 50–65)
PDW BLD-RTO: 13 % (ref 11.5–14.5)
PLATELET # BLD: 173 K/UL (ref 130–400)
PMV BLD AUTO: 9.9 FL (ref 9.4–12.4)
POTASSIUM SERPL-SCNC: 4.4 MMOL/L (ref 3.5–5)
RBC # BLD: 5.16 M/UL (ref 4.7–6.1)
SODIUM BLD-SCNC: 139 MMOL/L (ref 136–145)
TOTAL IRON BINDING CAPACITY: 261 UG/DL (ref 250–400)
TOTAL PROTEIN: 7.8 G/DL (ref 6.6–8.7)
WBC # BLD: 4.7 K/UL (ref 4.8–10.8)

## 2023-03-02 PROCEDURE — 3075F SYST BP GE 130 - 139MM HG: CPT | Performed by: NURSE PRACTITIONER

## 2023-03-02 PROCEDURE — G8484 FLU IMMUNIZE NO ADMIN: HCPCS | Performed by: NURSE PRACTITIONER

## 2023-03-02 PROCEDURE — G8417 CALC BMI ABV UP PARAM F/U: HCPCS | Performed by: NURSE PRACTITIONER

## 2023-03-02 PROCEDURE — 3078F DIAST BP <80 MM HG: CPT | Performed by: NURSE PRACTITIONER

## 2023-03-02 PROCEDURE — 3017F COLORECTAL CA SCREEN DOC REV: CPT | Performed by: NURSE PRACTITIONER

## 2023-03-02 PROCEDURE — G8427 DOCREV CUR MEDS BY ELIG CLIN: HCPCS | Performed by: NURSE PRACTITIONER

## 2023-03-02 PROCEDURE — 1123F ACP DISCUSS/DSCN MKR DOCD: CPT | Performed by: NURSE PRACTITIONER

## 2023-03-02 PROCEDURE — 1036F TOBACCO NON-USER: CPT | Performed by: NURSE PRACTITIONER

## 2023-03-02 PROCEDURE — 99214 OFFICE O/P EST MOD 30 MIN: CPT | Performed by: NURSE PRACTITIONER

## 2023-03-02 ASSESSMENT — PATIENT HEALTH QUESTIONNAIRE - PHQ9
SUM OF ALL RESPONSES TO PHQ QUESTIONS 1-9: 0
SUM OF ALL RESPONSES TO PHQ9 QUESTIONS 1 & 2: 0
1. LITTLE INTEREST OR PLEASURE IN DOING THINGS: 0
2. FEELING DOWN, DEPRESSED OR HOPELESS: 0
SUM OF ALL RESPONSES TO PHQ QUESTIONS 1-9: 0

## 2023-03-02 NOTE — PROGRESS NOTES
[facility] PHYSICIAN SERVICES  [facility]  [address]  [address] [address]  Dept: [phone]  Dept Fax: [phone]  Loc: [phone]    Junior Medina is a 67 y.o. male who presents today for his medical conditions/complaints as noted below. Junior Medina is c/o of 6 Month Follow-Up (Patient presents today for 6 month follow up. Patient has port. He states that he is fasting if labs are needed.)        HPI:     HPI   Chief Complaint   Patient presents with    6 Month Follow-Up     Patient presents today for 6 month follow up. Patient has port. He states that he is fasting if labs are needed. He was diagnosed with lymphoma last year and he still doing appointments with the oncologist but he is done with his treatments. I saw him last in August and he has gained some of his weight back and looks really well today. He says he feels much better. He just got fitted for new dentures because of the weight loss.   Past Medical History:   Diagnosis Date    Cancer [facility])     lymphoma    Chronic neck pain     Herniated disc, cervical     HTN (hypertension)       Past Surgical History:   Procedure Laterality Date    DENTAL SURGERY      LYMPH NODE BIOPSY      left side of neck    PORT SURGERY N/A 04/08/2022    SINGLE LUMEN PORT PLACEMENT WITH US & FLUORO performed by Vi Cuneca MD at [address] [place] [address] 3/2/2023 12/7/2022 11/30/2022 11/30/2022 11/30/2022 33/12/0297   SYSTOLIC [phone] 389 296   DIASTOLIC 70 78 76 90 80 80   Pulse 72 83 - 83 98 -   Temp 96.6 - - 97.1 - -   Resp 16 - - - - -   SpO2 98 97 - 97 99 -   Weight 173 lb 172 lb - 171 lb 167 lb -   Height 5' 7\" 5' 7\" - 5' 7\" 5' 7\" 5' 7\"   Body mass index 27.09 kg/m2 26.94 kg/m2 - 26.78 kg/m2 26.15 kg/m2 -   Pain Level - - - - - -   Some recent data might be hidden       Family History   Problem Relation Age of Onset    Cancer Mother         lymphoma    Heart Disease Father        Social History     Tobacco Use    Smoking status: Former     Packs/day: 2.00     Years: 15.00     Pack years: 30.00     Types: Cigarettes     Quit date:      Years since quittin.1    Smokeless tobacco: Never   Substance Use Topics    Alcohol use: Yes     Comment: Occasional       Current Outpatient Medications on File Prior to Visit   Medication Sig Dispense Refill    dilTIAZem (CARDIZEM CD) 240 MG extended release capsule Take 1 capsule by mouth daily 90 capsule 3    XARELTO 20 MG TABS tablet Take 1 tablet by mouth once daily with breakfast 30 tablet 5    nystatin (MYCOSTATIN) 169282 UNIT/GM ointment APPLY OINTMENT TOPICALLY TWICE DAILY      lidocaine-prilocaine (EMLA) 2.5-2.5 % cream Apply topically to port area and cover with plastic wrap one hour prior to treatment. 1 each 1    zinc gluconate 50 MG tablet Take 50 mg by mouth daily      KRILL OIL PO Take by mouth      Red Yeast Rice Extract (RED YEAST RICE PO) Take by mouth      Probiotic Product (PROBIOTIC-10 PO) Take by mouth      Zn-Pyg Afri-Nettle-Saw Palmet (SAW PALMETTO COMPLEX PO) Take by mouth      Multiple Vitamins-Minerals (THERAPEUTIC MULTIVITAMIN-MINERALS) tablet Take 1 tablet by mouth daily      Vitamin D (CHOLECALCIFEROL) 1000 UNITS CAPS capsule Take 1,000 Units by mouth daily       niacin 500 MG CR capsule Take 500 mg by mouth nightly       turmeric 500 MG CAPS Take by mouth in the morning, at noon, and at bedtime  (Patient not taking: Reported on 3/2/2023)      ferrous sulfate (IRON 325) 325 (65 Fe) MG tablet Take 325 mg by mouth daily (with breakfast) (Patient not taking: Reported on 3/2/2023)       No current facility-administered medications on file prior to visit.      No Known Allergies    Health Maintenance   Topic Date Due    COVID-19 Vaccine (1) Never done    Pneumococcal 65+ years Vaccine (1 - PCV) Never done    Hepatitis C screen  Never done    DTaP/Tdap/Td vaccine (1 - Tdap) Never done    Shingles vaccine (1 of 2) Never done    Flu vaccine (1) Never done    Annual Wellness Visit (AWV)  10/09/2022    Depression Screen  08/25/2023    Colorectal Cancer Screen  03/17/2026    Lipids  08/25/2027    AAA screen  Completed    Hepatitis A vaccine  Aged Out    Hib vaccine  Aged Out    Meningococcal (ACWY) vaccine  Aged Out       Subjective:      Review of Systems   Constitutional:  Positive for fatigue. HENT: Negative. Musculoskeletal:  Positive for arthralgias. Psychiatric/Behavioral:  The patient is nervous/anxious. Objective:     Physical Exam  Vitals and nursing note reviewed. Constitutional:       Appearance: Normal appearance. He is well-developed. HENT:      Head: Normocephalic. Nose: Nose normal.   Cardiovascular:      Rate and Rhythm: Normal rate and regular rhythm. Heart sounds: Murmur (2/6) heard. Pulmonary:      Effort: Pulmonary effort is normal.      Breath sounds: Normal breath sounds. Skin:     General: Skin is warm and dry. Capillary Refill: Capillary refill takes less than 2 seconds. Neurological:      General: No focal deficit present. Mental Status: He is alert and oriented to person, place, and time. Psychiatric:         Mood and Affect: Mood normal.         Behavior: Behavior normal.         Thought Content: Thought content normal.         Judgment: Judgment normal.     /70   Pulse 72   Temp (!) 96.6 °F (35.9 °C) (Temporal)   Resp 16   Ht 5' 7\" (1.702 m)   Wt 173 lb (78.5 kg)   SpO2 98%   BMI 27.10 kg/m²     Assessment:       Diagnosis Orders   1. Essential hypertension  Comprehensive Metabolic Panel      2. Lymphoma of lymph nodes of multiple regions, unspecified lymphoma type ([address]  CBC with Auto Differential    Comprehensive Metabolic Panel      3. Atrial fibrillation, unspecified type ([address]        4. Decreased hemoglobin  CBC with Auto Differential    Iron and TIBC            Plan:   More than 50% of the time was spent counseling and coordinating care for a total time of 30min face to face.   Reviewed the notes from Dr. Bisi Bauer. He has not had a CVA see since November so were going to repeat that today. He does have scans coming up next week and will follow-up with Dr. Bisi Bauer on March 13. He is still seeing cardiology for his heart and had an echo last year. His murmur sounded the same to me today but his heart was in rhythm. We are going to get his lab work today and I will get it to Dr. Bisi Bauer. We will continue current medication regimen. See him back in 6 months  PDMP Monitoring:    Last PDMP Bart as Reviewed:  Review User Review Instant Review Result            Urine Drug Screenings (1 yr)    No resulted procedures found. Medication Contract and Consent for Opioid Use Documents Filed        No documents found                     Patient given educational materials -see patient instructions. Discussed use, benefit, and side effects of prescribed medications. All patient questions answered. Pt voiced understanding. Reviewed health maintenance. Instructed to continue currentmedications, diet and exercise. Patient agreed with treatment plan. Follow up as directed. MEDICATIONS:  No orders of the defined types were placed in this encounter. ORDERS:  Orders Placed This Encounter   Procedures    CBC with Auto Differential    Comprehensive Metabolic Panel    Iron and TIBC       Follow-up:  No follow-ups on file. PATIENT INSTRUCTIONS:  There are no Patient Instructions on file for this visit. Electronically signed by MICHELA Rendon CNP on 3/2/2023 at 9:11 AM    EMR Dragon/transcription disclaimer:  Much of thisencounter note is electronic transcription/translation of spoken language to printed texts. The electronic translation of spoken language may be erroneous, or at times, nonsensical words or phrases may be inadvertentlytranscribed.   Although I have reviewed the note for such errors, some may still exist.

## 2023-03-06 ENCOUNTER — HOSPITAL ENCOUNTER (OUTPATIENT)
Dept: INFUSION THERAPY | Age: 73
Discharge: HOME | End: 2023-03-06
Payer: MEDICARE

## 2023-03-06 ENCOUNTER — HOSPITAL ENCOUNTER (OUTPATIENT)
Dept: CT IMAGING | Age: 73
Discharge: HOME | End: 2023-03-06
Payer: MEDICARE

## 2023-03-06 DIAGNOSIS — C85.98 LYMPHOMA OF LYMPH NODES OF MULTIPLE SITES (HCC): ICD-10-CM

## 2023-03-06 DIAGNOSIS — Z45.2 ENCOUNTER FOR CENTRAL LINE CARE: Primary | ICD-10-CM

## 2023-03-06 DIAGNOSIS — C83.32 DIFFUSE LARGE B-CELL LYMPHOMA OF INTRATHORACIC LYMPH NODES (HCC): ICD-10-CM

## 2023-03-06 PROCEDURE — 74177 CT ABD & PELVIS W/CONTRAST: CPT | Performed by: RADIOLOGY

## 2023-03-06 PROCEDURE — 6360000002 HC RX W HCPCS: Performed by: INTERNAL MEDICINE

## 2023-03-06 PROCEDURE — 96523 IRRIG DRUG DELIVERY DEVICE: CPT

## 2023-03-06 PROCEDURE — 2580000003 HC RX 258: Performed by: INTERNAL MEDICINE

## 2023-03-06 PROCEDURE — 71260 CT THORAX DX C+: CPT

## 2023-03-06 PROCEDURE — 74177 CT ABD & PELVIS W/CONTRAST: CPT

## 2023-03-06 PROCEDURE — 6360000004 HC RX CONTRAST MEDICATION: Performed by: INTERNAL MEDICINE

## 2023-03-06 PROCEDURE — 71260 CT THORAX DX C+: CPT | Performed by: RADIOLOGY

## 2023-03-06 RX ORDER — SODIUM CHLORIDE 0.9 % (FLUSH) 0.9 %
5-40 SYRINGE (ML) INJECTION PRN
OUTPATIENT
Start: 2023-03-06

## 2023-03-06 RX ORDER — HEPARIN SODIUM (PORCINE) LOCK FLUSH IV SOLN 100 UNIT/ML 100 UNIT/ML
500 SOLUTION INTRAVENOUS PRN
OUTPATIENT
Start: 2023-03-06

## 2023-03-06 RX ORDER — HEPARIN SODIUM (PORCINE) LOCK FLUSH IV SOLN 100 UNIT/ML 100 UNIT/ML
500 SOLUTION INTRAVENOUS PRN
Status: DISCONTINUED | OUTPATIENT
Start: 2023-03-06 | End: 2023-03-07 | Stop reason: HOSPADM

## 2023-03-06 RX ORDER — SODIUM CHLORIDE 0.9 % (FLUSH) 0.9 %
5-40 SYRINGE (ML) INJECTION PRN
Status: DISCONTINUED | OUTPATIENT
Start: 2023-03-06 | End: 2023-03-07 | Stop reason: HOSPADM

## 2023-03-06 RX ADMIN — SODIUM CHLORIDE, PRESERVATIVE FREE 10 ML: 5 INJECTION INTRAVENOUS at 11:11

## 2023-03-06 RX ADMIN — IOPAMIDOL 75 ML: 755 INJECTION, SOLUTION INTRAVENOUS at 11:09

## 2023-03-06 RX ADMIN — HEPARIN 500 UNITS: 100 SYRINGE at 11:11

## 2023-03-09 NOTE — PROGRESS NOTES
MEDICAL ONCOLOGY PROGRESS NOTE    Pt Name: Vince Orellana  MRN: [id number]  YOB: 1950  Date of evaluation: 3/13/2023  History Obtained From:  patient, electronic medical record    HISTORY OF PRESENT ILLNESS:  The patient has a diagnosis of diffuse large B-cell lymphoma, ABC phenotype. Patient is currently status post completion of 6 cycles R-CHOP with G-CSF support. Treatment started 4/8/2022 and completed 7/28/2022. PET scan posttreatment showed complete resolution of his lymphoma. Denies any peripheral adenopathy. Denies any B symptoms. Diagnosis  Diffuse large B-cell lymphoma, March 2022  c-Myc, BCL6-negative  BCL2 -positive rearrangement  ABC phenotype  Stage IIIB    Treatment summary  4/8/2022- 7/28/22 Completed R-CHOP + GCSF every 3 weeks x 6 cycles    Hematology history  Pat Penn was first seen by me on 3/24/2022. The patient was referred by his primary care provider for findings of generalized lymphadenopathy. The patient has had symptoms of weight loss, night sweats and low-grade fever. This seems has been going on for many months now. Patient had Covid 19 infection last year. He had CT scans that showed generalized adenopathy. 3/22/2022-CT soft tissue neck showed findings of cervical adenopathy identified, including a right level 3 node measuring 1.6 cm in greatest axial diameter (series 4-image 95). There is a left level 5 lymph node measuring 1.7 cm on axial image 102. Multiple enlarged left level 4 nodes, including a 2.3 cm node on axial image 114. Adenopathy extends down into the left supraclavicular fossa and there is bulky adenopathy also appreciated in the upper mediastinum. 3/22/2022-CT chest with contrast showed findings of mediastinal adenopathy. Subcarinal lymph nodes are present. Right hilar lymph nodes are present. Left para-aortic adenopathy is present in the abdomen. Concern for lymphoma.   3/22/2002-CT abdomen/pelvis showed spleen is enlarged measuring 14 cm craniocaudal dimension. Bilateral renal cysts including dominant 5.4 cm RIGHT upper pole exophytic renal cyst. 3 mm nonobstructing LEFT lower pole renal calculus. Prostate is mildly enlarged measuring 4.8 cm transverse dimension. Bulky retroperitoneal lymphadenopathy is noted. Noted conglomerate in the LEFT periaortic region on image 34 series 4 measures 6.0 x 3.8 cm. Enlarged gastrohepatic ligament lymph nodes with reference node on image 23 measuring 14 mm short axis dimension. No enlarged pelvic or inguinal lymph nodes identified. Bilateral chronic L5 pars defects with grade 1 anterolisthesis of L5 on S1. No acute osseous finding. 3/24/2022- (H), hemoglobin 10.1/MCV 93, platelets 399,601, WBC 8.7  3/24/2022-she was first seen by me. Recommended excisional biopsy left supraclavicular lymph node. 3/24/2022  B2M 2.8, Uric Acid 3.6,   3/28/2022 Left Cervical Lymphadenopathy (BHP): The dominant nodes are seen in the left level 4  and left level 5 neck as well as in the left supraclavicular fossa and upper mediastinum. Degenerative spinal stenosis. Atheromatous calcification in the carotid bulbs. 3/28/2022-excisional anibal biopsy. Final pathology pending. Preliminary results suggestive of high-grade lymphoma. Recommend allopurinol 300 mg p.o. daily. Recommend a PET scan and bone marrow biopsy. 4/4/2022-IHC studies from excisional node biopsy consistent with diffuse large B-cell lymphoma, ABC phenotype. BCL2, BCL6 and c-Myc rearrangement in process. Recommended R-CHOP x6 cycles. Started on allopurinol. 4/6/22 Bone marrow ([facility]): BONE MARROW: Normocellular (30-35% cellular) marrow with multilineage hematopoiesis. Negative for lymphoid infiltrates. Negative for dysplasia, no increase in blasts. Storage iron present, no ring sideroblasts. FLOW CYTOMETRY: No B-cell monoclonality and no T-cell aberrant antigenic expression. The blasts are not increa we need to sed.  CYTOGENETICS: Normal male karyotype. 4/7/22 PET CT SKULL BASE TO MID THIGH  Extensive lymphadenopathy consistent with lymphoma. Many of these nodes are extremely metabolically active including SUV measurements of greater than 40-50 in some of the nodes. There is extensive adenopathy in the lower cervical chains including level 3, 4 and 5 nodes. Supraclavicular lymphadenopathy is present with extension into the superior mediastinum. Both anterior and middle mediastinal adenopathy as well as right hilar adenopathy is noted within the chest. A right retrocrural node, gastrohepatic ligament nodes and bulky left para-aortic nodes are also present all demonstrating intense abnormal metabolic activity. 4/8/2022- Initiated R-CHOP + GCSF every 3 weeks x 6 cycles  5/12/2022 2D Echocardiogram Left ventricle normal chamber size and thickness  Preserved systolic function with estimated ejection fraction of 54%  However, GLS is slightly decreased especially the apex. This is more sensitive to predict chemotherapy induced cardiomyopathy then ejection  fraction  No regional wall motion abnormality Mitral annulus calcification with moderate degree of mitral leaflet  thickening and normal mobility  Bicuspid aortic valve with gradient of 10 mm. Judging from stroke-volume  index which is 27.7, which is reduced. This may represent low gradient, normal ejection fraction, low stroke-volume significant aortic stenosis. If patient is symptomatic, please refer to cardiology for ARIADNA  5/16/2022 Echo ARIADNA LVEF 55%. Mildly thickened aortic valve leaflets with preserved leaflet mobility. Moderate TR.  6/10/2022 CT Chest WO Contrast Chronic fibrosing interstitial lung disease - fibrotic NSIP versus probable UIP. Etiology could be drug induced. Few subcentimeter sized mediastinal lymph nodes are seen at bilateral upper paratracheal, bilateral lower paratracheal and subcarinal location. Multiple small liver cyst.Cholelithiasis without cholecystitis.  Right renal simple cyst.Degenerative changes in the spine. Multiple subcentimeter bilateral nonobstructive calyceal calcifications. Recommendation:Follow up as clinically indicated. 6/10/2022 CT Abd/Pelvis WO Contrast There is decrease in the size of retroperitoneal lymphadenopathy compared to prior study, suggestive of response to treatment. Cholelithiasis without evidence of cholecystitis. Multiple simple liver cysts. Recommendation: Follow up as clinically indicated. 6/10/2022 CT Soft Tissue Neck WO Contrast Few enlarge nodes are noted in left level IV. Few sub cm sized noted in level Ia and bilateral level Ib. Recommendation:Follow up as clinically indicated. 6/16/2022-completion 3 cycles R-CHOP. CT chest abdomen pelvis and soft tissue neck showed interval response to treatment. 7/7/2022-cycle #5 R-CHOP with 25% dose reduction of Adriamycin due to elevated LFTs 3-4 times. Normal bilirubin. Low-dose correction was increasing her cyclophosphamide. Messaged cardiology about amiodarone. I believe this is related to him being on amiodarone. 4/8/2022- 7/28/22 Completed R-CHOP + GCSF every 3 weeks x 6 cycles. 8/25/2022 PSA 0.82  8/30/2022 PET/CT Skull Base to Mid Thigh 8/30/2022 PET CT Skull Base to Mid Thigh Compared to prior PET/CT  there has been positive response to treatment. The largest measures approximately 3.9 mm in transverse diameter and 2.3 mm in AP diameter. Nonobstructing left renal stone measuring up to 3 mm in the lower pole unchanged. Lymphadenopathy has resolved or significantly decreased in size with non focal suspicious remaining hypermetabolic uptake appreciated. Lymphadenopathy in the left thyroid Umair has significantly decreased in size and is no longer hypermetabolic with the largest lymph node remaining measuring 1.0 x 2.5 cm.  8/31/2022-essentially, PET/CT scan showed complete resolution of prior hypermetabolic adenopathy. This is consistent with complete imaging response.   Recommended clinical/imaging surveillance. 3/6/23 CT Chest W Contrast No acute abnormality is identified within the thorax. No pathologically enlarged lymph nodes are identified. Additional findings as detailed above. 3/6/23 CT Abd/Pelvis W IV Contrast (oral) No acute abnormality identified within the abdomen or pelvis. Borderline prominent LEFT periaortic lymph nodes are seen at about the level of the kidneys, measuring up to 2.3 x 1.2 cm in axial dimensions. This finding is not significantly changed since PET/CT dated 08/29/2022. This has decreased in size since abdominal CT from 06/10/2022. Cholelithiasis. Additional findings as detailed above. 3/13/2023-I have reviewed the CT chest abdomen pelvis. No evidence of disease progression. No new lymphadenopathy. Stable left para-aortic lymph node without activity on prior PET scan. Continue clinical/image surveillance follow-up.       Past Medical History:    Past Medical History:   Diagnosis Date    Cancer ([address]     lymphoma    Chronic neck pain     Herniated disc, cervical     HTN (hypertension)        Past Surgical History:    Past Surgical History:   Procedure Laterality Date    DENTAL SURGERY      LYMPH NODE BIOPSY      left side of neck    PORT SURGERY N/A 04/08/2022    SINGLE LUMEN PORT PLACEMENT WITH US & FLUORO performed by Jonny Sandhoff, MD at [place] [id number] History:    Marital status:  Smoking status:Former smoker, Quit more 25 years ago  ETOH status:No   Resides:[place]    Family History:   Family History   Problem Relation Age of Onset    Cancer Mother         lymphoma    Heart Disease Father        Current Hospital Medications:    Current Outpatient Medications   Medication Sig Dispense Refill    dilTIAZem (CARDIZEM CD) 240 MG extended release capsule Take 1 capsule by mouth daily 90 capsule 3    XARELTO 20 MG TABS tablet Take 1 tablet by mouth once daily with breakfast 30 tablet 5    nystatin (MYCOSTATIN) 523188 UNIT/GM ointment APPLY OINTMENT TOPICALLY TWICE DAILY      lidocaine-prilocaine (EMLA) 2.5-2.5 % cream Apply topically to port area and cover with plastic wrap one hour prior to treatment. 1 each 1    zinc gluconate 50 MG tablet Take 50 mg by mouth daily      KRILL OIL PO Take by mouth      Red Yeast Rice Extract (RED YEAST RICE PO) Take by mouth      Probiotic Product (PROBIOTIC-10 PO) Take by mouth      Zn-Pyg Afri-Nettle-Saw Palmet (SAW PALMETTO COMPLEX PO) Take by mouth      Multiple Vitamins-Minerals (THERAPEUTIC MULTIVITAMIN-MINERALS) tablet Take 1 tablet by mouth daily      Vitamin D (CHOLECALCIFEROL) 1000 UNITS CAPS capsule Take 1,000 Units by mouth daily       niacin 500 MG CR capsule Take 500 mg by mouth nightly       turmeric 500 MG CAPS Take by mouth in the morning, at noon, and at bedtime  (Patient not taking: No sig reported)      ferrous sulfate (IRON 325) 325 (65 Fe) MG tablet Take 325 mg by mouth daily (with breakfast) (Patient not taking: No sig reported)       No current facility-administered medications for this visit.        Allergies: No Known Allergies      Subjective   REVIEW OF SYSTEMS:   CONSTITUTIONAL: no fever, no night sweats, fatigue;  HEENT: no blurring of vision, no double vision, no hearing difficulty, no tinnitus, no ulceration, no dysplasia, no epistaxis;   LUNGS: no cough, no hemoptysis, no wheeze,  no shortness of breath;  CARDIOVASCULAR: no palpitation, no chest pain, no shortness of breath;  GI: no abdominal pain, no nausea, no vomiting, no diarrhea, no constipation;  KARINA: no dysuria, no hematuria, no frequency or urgency, no nephrolithiasis;  MUSCULOSKELETAL: no joint pain, no swelling, no stiffness;  ENDOCRINE: no polyuria, no polydipsia, no cold or heat intolerance;  HEMATOLOGY: no easy bruising or bleeding, no history of clotting disorder;  DERMATOLOGY: no skin rash, no eczema, no pruritus;  PSYCHIATRY: no depression, no anxiety, no panic attacks, no suicidal ideation, no homicidal ideation;  NEUROLOGY: no syncope, no seizures, no numbness or tingling of hands, no numbness or tingling of feet, no paresis;    Objective   /72   Pulse 84   Temp 99.1 °F (37.3 °C)   Ht 5' 7\" (1.702 m)   Wt 177 lb (80.3 kg)   SpO2 97%   BMI 27.72 kg/m²     PHYSICAL EXAM:  CONSTITUTIONAL: Alert, appropriate, no acute distress  EYES: Non icteric, EOM intact, pupils equal round   ENT: Mucus membranes moist, no oral pharyngeal lesions, external inspection of ears and nose are normal.  NECK: Supple, no masses.  No palpable thyroid mass  CHEST/LUNGS: CTA bilaterally, normal respiratory effort   CARDIOVASCULAR: RRR, no murmurs.  No lower extremity edema  ABDOMEN: soft non-tender, active bowel sounds, no HSM.  No palpable masses  EXTREMITIES: warm, full ROM in all 4 extremities, no focal weakness.  SKIN: warm, dry with no rashes or lesions  LYMPH: No cervical, clavicular, axillary, or inguinal lymphadenopathy  NEUROLOGIC: follows commands, non focal     LABORATORY RESULTS REVIEWED/ANALYZED BY ME:  3/13/23 CBC  WBC 4.68  HGB 16    Neut 2.97    RADIOLOGY STUDIES REPORT REVIEWED/INTERPRETED BY ME:  3/6/23 CT Chest W Contrast No acute abnormality is identified within the thorax. No pathologically enlarged lymph nodes are identified. Additional findings as detailed above.    3/6/23 CT Abd/Pelvis W IV Contrast (oral) No acute abnormality identified within the abdomen or pelvis. Borderline prominent LEFT periaortic lymph nodes are seen at about the level of the kidneys, measuring up to 2.3 x 1.2 cm in axial dimensions.This finding is not significantly changed since PET/CT dated 08/29/2022.This has decreased in size since abdominal CT from 06/10/2022. Cholelithiasis. Additional findings as detailed above.    ASSESSMENT:  #Diffuse large B-cell lymphoma, stage IIIB, ABC phenotype (c-Myc(-), BCL-2 (+), BCL6(-)rearrangement)  -3/28/2022-excisional node biopsy by Dr. Ga Perea, [facility]NT [facility].  -Path consistent with ABC phenotype DLBCL:  Lymphoma as per Dr. Candy Mahmood. -PET scan and bone marrow biopsy-ordered today on 4/4/2022.  -Persistent B symptoms.  - PET scan/bone marrow biopsy on 4/4/2022. (Negative for lymphoma involvement)  Extensive lymphadenopathy consistent with lymphoma. Many of these nodes are extremely metabolically active including SUV measurements of greater than 40-50 in some of the nodes. There is extensive adenopathy in the lower cervical chains including level 3, 4 and 5 nodes. Supraclavicular lymphadenopathy is present with extension into the superior mediastinum. Both anterior and middle mediastinal adenopathy as well as right hilar adenopathy is noted within the chest. A right retrocrural node, gastrohepatic ligament nodes and bulky left para-aortic nodes are also present all demonstrating intense abnormal metabolic activity. .      Recommended:  -R-CHOP x6 cycles  G-CSF support  6/10/2022-CT neck/C/A/P-interval response to treatment after 3 cycles. 8/30/2022 PET/CT Skull Base to Mid Thigh 8/30/2022 PET CT Skull Base to Mid Thigh Compared to prior PET/CT  there has been positive response to treatment. The largest measures approximately 3.9 mm in transverse diameter and 2.3 mm in AP diameter. Nonobstructing left renal stone measuring up to 3 mm in the lower pole unchanged. Lymphadenopathy has resolved or significantly decreased in size with non focal suspicious remaining hypermetabolic uptake appreciated. Lymphadenopathy in the left thyroid Umair has significantly decreased in size and is no longer hypermetabolic with the largest lymph node remaining measuring 1.0 x 2.5 cm.  8/31/2022-essentially, PET/CT scan showed complete resolution of prior hypermetabolic adenopathy. This is consistent with complete imaging response. Recommended clinical/imaging surveillance. 3/6/23 CT Chest W Contrast No acute abnormality is identified within the thorax. No pathologically enlarged lymph nodes are identified.  Additional findings as detailed above.  3/6/23 CT Abd/Pelvis W IV Contrast (oral) No acute abnormality identified within the abdomen or pelvis. Borderline prominent LEFT periaortic lymph nodes are seen at about the level of the kidneys, measuring up to 2.3 x 1.2 cm in axial dimensions.This finding is not significantly changed since PET/CT dated 08/29/2022.This has decreased in size since abdominal CT from 06/10/2022. Cholelithiasis. Additional findings as detailed above.  3/13/2023-I have reviewed the CT chest abdomen pelvis.  No evidence of disease progression.  No new lymphadenopathy.  Stable left para-aortic lymph node without activity on prior PET scan.  Continue clinical/image surveillance follow-up.    Recommended follow-up as per NCCN guidelines.      Treatment related toxicity-fatigue, anemia, abnormal LFTs have resolved, neutropenia    Treatment related anemia  Lab Results   Component Value Date    WBC 4.7 (L) 03/02/2023    HGB 16.3 03/02/2023    HCT 49.0 03/02/2023    MCV 95.0 (H) 03/02/2023     03/02/2023     Lab Results   Component Value Date    NEUTROABS 3.2 03/02/2023     Pulmonary interstitial fibrosis-I reviewed the CT scan with the patient.  He has complaints of short of breath on exertion.  He has findings of interstitial fibrosis.  I offered a pulmonology consultation but he wants to defer this at this time.    Port care-port flush q 8 weeks    Immunization counseling-recommended flu, COVID and shingles vaccination.      PLAN:  RTC with MD 6 months after scans  Port flush every 8 weeks  Repeat CT Chest,Abdomen, Pelvis in Sept 2023  Continue follow-up with [facility]/Dr Miky Perez,6/7/23  Consider Pulmonology referral in the future when treatments complete  Consider Flu, Covid vaccines, Shingles injection    Queenie YANEZ am pre charting  as Medical Assistant for John Munoz MD. Electronically signed by Queenie Domingo MA on 3/13/2023 at 9:31 AM CST.    Padmini YANEZ am scribing for John CHAVEZ  Marian Levy MD. Electronically signed by Arnoldo Seo RN on 3/13/2023 at 10:44 AM CDT. I, Dr Damaris Gruber, personally performed the services described in this documentation as scribed by Arnoldo Seo RN in my presence and is both accurate and complete. I have seen, examined and reviewed this patient medication list, appropriate labs and imaging studies. I reviewed relevant medical records and others physicians notes. I discussed the plans of care with the patient. I answered all the questions to the patients satisfaction. I have also reviewed the chief complaint (CC) and part of the history (History of Present Illness (HPI), Past Family Social History [facility]), or Review of Systems (ROS) and made changes when appropriated. (Please note that portions of this note were completed with a voice recognition program. Efforts were made to edit the dictations but occasionally words are mis-transcribed. )Electronically signed by Young Alejandre MD on 3/13/2023 at 10:44 AM

## 2023-03-13 ENCOUNTER — OFFICE VISIT (OUTPATIENT)
Dept: HEMATOLOGY | Age: 73
End: 2023-03-13
Payer: MEDICARE

## 2023-03-13 ENCOUNTER — HOSPITAL ENCOUNTER (OUTPATIENT)
Dept: INFUSION THERAPY | Age: 73
Discharge: HOME | End: 2023-03-13
Payer: MEDICARE

## 2023-03-13 VITALS
SYSTOLIC BLOOD PRESSURE: 136 MMHG | OXYGEN SATURATION: 97 % | WEIGHT: 177 LBS | HEART RATE: 84 BPM | DIASTOLIC BLOOD PRESSURE: 72 MMHG | HEIGHT: 67 IN | TEMPERATURE: 99.1 F | BODY MASS INDEX: 27.78 KG/M2

## 2023-03-13 DIAGNOSIS — Z71.89 CARE PLAN DISCUSSED WITH PATIENT: ICD-10-CM

## 2023-03-13 DIAGNOSIS — C83.30 DIFFUSE LARGE B-CELL LYMPHOMA, UNSPECIFIED BODY REGION (HCC): ICD-10-CM

## 2023-03-13 DIAGNOSIS — C83.32 DIFFUSE LARGE B-CELL LYMPHOMA OF INTRATHORACIC LYMPH NODES (HCC): Primary | ICD-10-CM

## 2023-03-13 DIAGNOSIS — C85.98 LYMPHOMA OF LYMPH NODES OF MULTIPLE SITES (HCC): ICD-10-CM

## 2023-03-13 LAB
BASOPHILS ABSOLUTE: 0.05 K/UL (ref 0.01–0.08)
BASOPHILS RELATIVE PERCENT: 1.1 % (ref 0.1–1.2)
EOSINOPHILS ABSOLUTE: 0.08 K/UL (ref 0.04–0.54)
EOSINOPHILS RELATIVE PERCENT: 1.7 % (ref 0.7–7)
HCT VFR BLD CALC: 49 % (ref 40.1–51)
HEMOGLOBIN: 16 G/DL (ref 13.7–17.5)
LYMPHOCYTES ABSOLUTE: 0.94 K/UL (ref 1.18–3.74)
LYMPHOCYTES RELATIVE PERCENT: 20.1 % (ref 19.3–53.1)
MCH RBC QN AUTO: 31.7 PG (ref 25.7–32.2)
MCHC RBC AUTO-ENTMCNC: 32.7 G/DL (ref 32.3–36.5)
MCV RBC AUTO: 97 FL (ref 79–92.2)
MONOCYTES ABSOLUTE: 0.56 K/UL (ref 0.24–0.82)
MONOCYTES RELATIVE PERCENT: 12 % (ref 4.7–12.5)
NEUTROPHILS ABSOLUTE: 2.97 K/UL (ref 1.56–6.13)
NEUTROPHILS RELATIVE PERCENT: 63.4 % (ref 34–71.1)
PDW BLD-RTO: 13.2 % (ref 11.6–14.4)
PLATELET # BLD: 145 K/UL (ref 163–337)
PMV BLD AUTO: 9.5 FL (ref 7.4–10.4)
RBC # BLD: 5.05 M/UL (ref 4.63–6.08)
WBC # BLD: 4.68 K/UL (ref 4.23–9.07)

## 2023-03-13 PROCEDURE — 36415 COLL VENOUS BLD VENIPUNCTURE: CPT

## 2023-03-13 PROCEDURE — 85025 COMPLETE CBC W/AUTO DIFF WBC: CPT

## 2023-03-13 PROCEDURE — G8427 DOCREV CUR MEDS BY ELIG CLIN: HCPCS | Performed by: INTERNAL MEDICINE

## 2023-03-13 PROCEDURE — G8417 CALC BMI ABV UP PARAM F/U: HCPCS | Performed by: INTERNAL MEDICINE

## 2023-03-13 PROCEDURE — 3017F COLORECTAL CA SCREEN DOC REV: CPT | Performed by: INTERNAL MEDICINE

## 2023-03-13 PROCEDURE — 3078F DIAST BP <80 MM HG: CPT | Performed by: INTERNAL MEDICINE

## 2023-03-13 PROCEDURE — 1123F ACP DISCUSS/DSCN MKR DOCD: CPT | Performed by: INTERNAL MEDICINE

## 2023-03-13 PROCEDURE — 99213 OFFICE O/P EST LOW 20 MIN: CPT | Performed by: INTERNAL MEDICINE

## 2023-03-13 PROCEDURE — 1036F TOBACCO NON-USER: CPT | Performed by: INTERNAL MEDICINE

## 2023-03-13 PROCEDURE — 3075F SYST BP GE 130 - 139MM HG: CPT | Performed by: INTERNAL MEDICINE

## 2023-03-13 PROCEDURE — G8484 FLU IMMUNIZE NO ADMIN: HCPCS | Performed by: INTERNAL MEDICINE

## 2023-03-13 PROCEDURE — 99212 OFFICE O/P EST SF 10 MIN: CPT

## 2023-03-13 RX ORDER — HEPARIN SODIUM (PORCINE) LOCK FLUSH IV SOLN 100 UNIT/ML 100 UNIT/ML
500 SOLUTION INTRAVENOUS PRN
OUTPATIENT
Start: 2023-03-13

## 2023-03-13 RX ORDER — SODIUM CHLORIDE 0.9 % (FLUSH) 0.9 %
5-40 SYRINGE (ML) INJECTION PRN
OUTPATIENT
Start: 2023-03-13

## 2023-05-08 ENCOUNTER — HOSPITAL ENCOUNTER (OUTPATIENT)
Dept: INFUSION THERAPY | Age: 73
Discharge: HOME | End: 2023-05-08
Payer: MEDICARE

## 2023-05-08 DIAGNOSIS — Z45.2 ENCOUNTER FOR CENTRAL LINE CARE: Primary | ICD-10-CM

## 2023-05-08 PROCEDURE — 2580000003 HC RX 258: Performed by: INTERNAL MEDICINE

## 2023-05-08 PROCEDURE — 6360000002 HC RX W HCPCS: Performed by: INTERNAL MEDICINE

## 2023-05-08 PROCEDURE — 96523 IRRIG DRUG DELIVERY DEVICE: CPT

## 2023-05-08 RX ORDER — SODIUM CHLORIDE 0.9 % (FLUSH) 0.9 %
5-40 SYRINGE (ML) INJECTION PRN
OUTPATIENT
Start: 2023-05-08

## 2023-05-08 RX ORDER — SODIUM CHLORIDE 0.9 % (FLUSH) 0.9 %
5-40 SYRINGE (ML) INJECTION PRN
Status: DISCONTINUED | OUTPATIENT
Start: 2023-05-08 | End: 2023-05-09 | Stop reason: HOSPADM

## 2023-05-08 RX ORDER — HEPARIN SODIUM (PORCINE) LOCK FLUSH IV SOLN 100 UNIT/ML 100 UNIT/ML
500 SOLUTION INTRAVENOUS PRN
Status: DISCONTINUED | OUTPATIENT
Start: 2023-05-08 | End: 2023-05-09 | Stop reason: HOSPADM

## 2023-05-08 RX ORDER — HEPARIN SODIUM (PORCINE) LOCK FLUSH IV SOLN 100 UNIT/ML 100 UNIT/ML
500 SOLUTION INTRAVENOUS PRN
OUTPATIENT
Start: 2023-05-08

## 2023-05-08 RX ORDER — RIVAROXABAN 20 MG/1
TABLET, FILM COATED ORAL
Qty: 90 TABLET | Refills: 3 | Status: SHIPPED | OUTPATIENT
Start: 2023-05-08

## 2023-05-08 RX ADMIN — Medication 500 UNITS: at 09:14

## 2023-05-08 RX ADMIN — SODIUM CHLORIDE, PRESERVATIVE FREE 10 ML: 5 INJECTION INTRAVENOUS at 09:14

## 2023-06-07 ENCOUNTER — OFFICE VISIT (OUTPATIENT)
Dept: CARDIOLOGY CLINIC | Age: 73
End: 2023-06-07
Payer: MEDICARE

## 2023-06-07 VITALS
HEART RATE: 91 BPM | WEIGHT: 175 LBS | BODY MASS INDEX: 27.47 KG/M2 | HEIGHT: 67 IN | SYSTOLIC BLOOD PRESSURE: 138 MMHG | DIASTOLIC BLOOD PRESSURE: 86 MMHG | OXYGEN SATURATION: 97 %

## 2023-06-07 DIAGNOSIS — I48.92 ATRIAL FLUTTER WITH RAPID VENTRICULAR RESPONSE (HCC): Primary | ICD-10-CM

## 2023-06-07 PROCEDURE — 1123F ACP DISCUSS/DSCN MKR DOCD: CPT | Performed by: INTERNAL MEDICINE

## 2023-06-07 PROCEDURE — 3075F SYST BP GE 130 - 139MM HG: CPT | Performed by: INTERNAL MEDICINE

## 2023-06-07 PROCEDURE — 3017F COLORECTAL CA SCREEN DOC REV: CPT | Performed by: INTERNAL MEDICINE

## 2023-06-07 PROCEDURE — 99213 OFFICE O/P EST LOW 20 MIN: CPT | Performed by: INTERNAL MEDICINE

## 2023-06-07 PROCEDURE — 3079F DIAST BP 80-89 MM HG: CPT | Performed by: INTERNAL MEDICINE

## 2023-06-07 PROCEDURE — G8417 CALC BMI ABV UP PARAM F/U: HCPCS | Performed by: INTERNAL MEDICINE

## 2023-06-07 PROCEDURE — 1036F TOBACCO NON-USER: CPT | Performed by: INTERNAL MEDICINE

## 2023-06-07 PROCEDURE — G8427 DOCREV CUR MEDS BY ELIG CLIN: HCPCS | Performed by: INTERNAL MEDICINE

## 2023-06-07 NOTE — PROGRESS NOTES
Syd Batres is a 67 y.o. male presents with paroxysmal atrial fib. He has had no sustained tachycardia. He did restart his diltiazem and overall he is well controlled. He denies any significant changes. He denies any chest pain or shortness of breath. He can mow and weed eat without any problem at this point. Review of Systems   Constitutional: Negative for fever, chills, diaphoresis, activity change, appetite change, fatigue and unexpected weight change. Eyes: Negative for photophobia, pain, redness and visual disturbance. Respiratory: Negative for apnea, cough, chest tightness, shortness of breath, wheezing and stridor. Cardiovascular: Negative for chest pain, palpitations and leg swelling. Gastrointestinal: Negative for abdominal distention. Genitourinary: Negative for dysuria, urgency and frequency. Musculoskeletal: Negative for myalgias, arthralgias and gait problem. Skin: Negative for color change, pallor, rash and wound. Neurological: Negative for dizziness, tremors, speech difficulty, weakness and numbness. Hematological: Does not bruise/bleed easily. Psychiatric/Behavioral: Negative.           Social History     Socioeconomic History    Marital status:      Spouse name: Not on file    Number of children: Not on file    Years of education: Not on file    Highest education level: Not on file   Occupational History    Not on file   Tobacco Use    Smoking status: Former     Packs/day: 2.00     Years: 15.00     Pack years: 30.00     Types: Cigarettes     Quit date: 26     Years since quittin.4    Smokeless tobacco: Never   Vaping Use    Vaping Use: Never used   Substance and Sexual Activity    Alcohol use: Yes     Comment: Occasional     Drug use: No    Sexual activity: Not on file   Other Topics Concern    Not on file   Social History Narrative    Not on file     Social Determinants of Health     Financial Resource Strain: Low Risk     Difficulty of Paying Living

## 2023-06-20 ENCOUNTER — PATIENT MESSAGE (OUTPATIENT)
Dept: PRIMARY CARE CLINIC | Age: 73
End: 2023-06-20

## 2023-06-20 NOTE — TELEPHONE ENCOUNTER
From: Pebbles Mcelroy  To: Vonnie Wen  Sent: 6/20/2023 2:06 PM CDT  Subject: yellow tongue    I've had a yellow tongue for about two weeks with off and on diarrhea I feel pretty good but the tongue thing worries me.

## 2023-07-03 ENCOUNTER — HOSPITAL ENCOUNTER (OUTPATIENT)
Dept: INFUSION THERAPY | Age: 73
Discharge: HOME | End: 2023-07-03
Payer: MEDICARE

## 2023-07-03 DIAGNOSIS — Z45.2 ENCOUNTER FOR CENTRAL LINE CARE: Primary | ICD-10-CM

## 2023-07-03 PROCEDURE — 6360000002 HC RX W HCPCS: Performed by: INTERNAL MEDICINE

## 2023-07-03 PROCEDURE — 2580000003 HC RX 258: Performed by: INTERNAL MEDICINE

## 2023-07-03 PROCEDURE — 96523 IRRIG DRUG DELIVERY DEVICE: CPT

## 2023-07-03 RX ORDER — HEPARIN SODIUM 100 [USP'U]/ML
500 INJECTION, SOLUTION INTRAVENOUS PRN
OUTPATIENT
Start: 2023-07-03

## 2023-07-03 RX ORDER — SODIUM CHLORIDE 0.9 % (FLUSH) 0.9 %
5-40 SYRINGE (ML) INJECTION PRN
Status: DISCONTINUED | OUTPATIENT
Start: 2023-07-03 | End: 2023-07-04 | Stop reason: HOSPADM

## 2023-07-03 RX ORDER — HEPARIN SODIUM 100 [USP'U]/ML
500 INJECTION, SOLUTION INTRAVENOUS PRN
Status: DISCONTINUED | OUTPATIENT
Start: 2023-07-03 | End: 2023-07-04 | Stop reason: HOSPADM

## 2023-07-03 RX ORDER — SODIUM CHLORIDE 0.9 % (FLUSH) 0.9 %
5-40 SYRINGE (ML) INJECTION PRN
OUTPATIENT
Start: 2023-07-03

## 2023-07-03 RX ADMIN — SODIUM CHLORIDE, PRESERVATIVE FREE 10 ML: 5 INJECTION INTRAVENOUS at 10:12

## 2023-07-03 RX ADMIN — HEPARIN 500 UNITS: 100 SYRINGE at 10:12

## 2023-08-28 ENCOUNTER — HOSPITAL ENCOUNTER (OUTPATIENT)
Dept: INFUSION THERAPY | Age: 73
Discharge: HOME | End: 2023-08-28
Payer: MEDICARE

## 2023-08-28 ENCOUNTER — CLINICAL DOCUMENTATION (OUTPATIENT)
Dept: INFUSION THERAPY | Age: 73
End: 2023-08-28

## 2023-08-28 DIAGNOSIS — Z45.2 ENCOUNTER FOR CENTRAL LINE CARE: Primary | ICD-10-CM

## 2023-08-28 PROCEDURE — 96523 IRRIG DRUG DELIVERY DEVICE: CPT

## 2023-08-28 RX ORDER — SODIUM CHLORIDE 0.9 % (FLUSH) 0.9 %
5-40 SYRINGE (ML) INJECTION PRN
Status: CANCELLED | OUTPATIENT
Start: 2023-08-28

## 2023-08-28 RX ORDER — HEPARIN 100 UNIT/ML
500 SYRINGE INTRAVENOUS PRN
Status: DISCONTINUED | OUTPATIENT
Start: 2023-08-28 | End: 2023-08-29 | Stop reason: HOSPADM

## 2023-08-28 RX ORDER — SODIUM CHLORIDE 0.9 % (FLUSH) 0.9 %
5-40 SYRINGE (ML) INJECTION PRN
Status: DISCONTINUED | OUTPATIENT
Start: 2023-08-28 | End: 2023-08-29 | Stop reason: HOSPADM

## 2023-08-28 RX ORDER — HEPARIN 100 UNIT/ML
500 SYRINGE INTRAVENOUS PRN
Status: CANCELLED | OUTPATIENT
Start: 2023-08-28

## 2023-08-28 NOTE — PROGRESS NOTES
Patient presents today for port flush. He tells me he is coming back next week for port access for his scans and questions why port needs to be flushed two weeks in a row. I inform patient that he can wait until next week since his port will be flushed after his scans. He verbalized understanding. Appointment for port flush today cancelled as port will be flushed when accessed for scans on 9/7/23.

## 2023-09-02 SDOH — HEALTH STABILITY: PHYSICAL HEALTH: ON AVERAGE, HOW MANY MINUTES DO YOU ENGAGE IN EXERCISE AT THIS LEVEL?: 40 MIN

## 2023-09-02 SDOH — HEALTH STABILITY: PHYSICAL HEALTH: ON AVERAGE, HOW MANY DAYS PER WEEK DO YOU ENGAGE IN MODERATE TO STRENUOUS EXERCISE (LIKE A BRISK WALK)?: 7 DAYS

## 2023-09-02 ASSESSMENT — PATIENT HEALTH QUESTIONNAIRE - PHQ9
SUM OF ALL RESPONSES TO PHQ QUESTIONS 1-9: 0
1. LITTLE INTEREST OR PLEASURE IN DOING THINGS: 0
2. FEELING DOWN, DEPRESSED OR HOPELESS: 0
SUM OF ALL RESPONSES TO PHQ9 QUESTIONS 1 & 2: 0
SUM OF ALL RESPONSES TO PHQ QUESTIONS 1-9: 0

## 2023-09-02 ASSESSMENT — LIFESTYLE VARIABLES
HOW MANY STANDARD DRINKS CONTAINING ALCOHOL DO YOU HAVE ON A TYPICAL DAY: 98
HOW OFTEN DO YOU HAVE A DRINK CONTAINING ALCOHOL: 98
HOW OFTEN DO YOU HAVE SIX OR MORE DRINKS ON ONE OCCASION: 1
HOW OFTEN DO YOU HAVE A DRINK CONTAINING ALCOHOL: PATIENT DECLINED
HOW MANY STANDARD DRINKS CONTAINING ALCOHOL DO YOU HAVE ON A TYPICAL DAY: PATIENT DECLINED

## 2023-09-05 ENCOUNTER — OFFICE VISIT (OUTPATIENT)
Dept: PRIMARY CARE CLINIC | Age: 73
End: 2023-09-05
Payer: MEDICARE

## 2023-09-05 VITALS
HEIGHT: 67 IN | OXYGEN SATURATION: 99 % | RESPIRATION RATE: 18 BRPM | WEIGHT: 176.6 LBS | DIASTOLIC BLOOD PRESSURE: 78 MMHG | SYSTOLIC BLOOD PRESSURE: 140 MMHG | BODY MASS INDEX: 27.72 KG/M2 | HEART RATE: 77 BPM | TEMPERATURE: 97.2 F

## 2023-09-05 DIAGNOSIS — Z12.5 SCREENING PSA (PROSTATE SPECIFIC ANTIGEN): ICD-10-CM

## 2023-09-05 DIAGNOSIS — I48.91 ATRIAL FIBRILLATION, UNSPECIFIED TYPE (HCC): ICD-10-CM

## 2023-09-05 DIAGNOSIS — Z00.00 MEDICARE ANNUAL WELLNESS VISIT, SUBSEQUENT: Primary | ICD-10-CM

## 2023-09-05 DIAGNOSIS — C85.98 LYMPHOMA OF LYMPH NODES OF MULTIPLE REGIONS, UNSPECIFIED LYMPHOMA TYPE (HCC): ICD-10-CM

## 2023-09-05 DIAGNOSIS — Z13.220 ENCOUNTER FOR SCREENING FOR LIPID DISORDER: ICD-10-CM

## 2023-09-05 DIAGNOSIS — Z13.1 SCREENING FOR DIABETES MELLITUS: ICD-10-CM

## 2023-09-05 PROBLEM — R59.0 LEFT CERVICAL LYMPHADENOPATHY: Status: ACTIVE | Noted: 2022-03-24

## 2023-09-05 LAB
ALBUMIN SERPL-MCNC: 4.8 G/DL (ref 3.5–5.2)
ALP SERPL-CCNC: 85 U/L (ref 40–130)
ALT SERPL-CCNC: 25 U/L (ref 5–41)
ANION GAP SERPL CALCULATED.3IONS-SCNC: 12 MMOL/L (ref 7–19)
AST SERPL-CCNC: 23 U/L (ref 5–40)
BILIRUB SERPL-MCNC: 0.4 MG/DL (ref 0.2–1.2)
BUN SERPL-MCNC: 20 MG/DL (ref 8–23)
CALCIUM SERPL-MCNC: 9.9 MG/DL (ref 8.8–10.2)
CHLORIDE SERPL-SCNC: 103 MMOL/L (ref 98–111)
CHOLEST SERPL-MCNC: 217 MG/DL (ref 160–199)
CO2 SERPL-SCNC: 25 MMOL/L (ref 22–29)
CREAT SERPL-MCNC: 0.9 MG/DL (ref 0.5–1.2)
GLUCOSE SERPL-MCNC: 104 MG/DL (ref 74–109)
HDLC SERPL-MCNC: 47 MG/DL (ref 55–121)
LDLC SERPL CALC-MCNC: 143 MG/DL
POTASSIUM SERPL-SCNC: 5.3 MMOL/L (ref 3.5–5)
PROT SERPL-MCNC: 7.9 G/DL (ref 6.6–8.7)
PSA SERPL-MCNC: 0.94 NG/ML (ref 0–4)
SODIUM SERPL-SCNC: 140 MMOL/L (ref 136–145)
TRIGL SERPL-MCNC: 137 MG/DL (ref 0–149)

## 2023-09-05 PROCEDURE — 3078F DIAST BP <80 MM HG: CPT | Performed by: NURSE PRACTITIONER

## 2023-09-05 PROCEDURE — 1123F ACP DISCUSS/DSCN MKR DOCD: CPT | Performed by: NURSE PRACTITIONER

## 2023-09-05 PROCEDURE — 3017F COLORECTAL CA SCREEN DOC REV: CPT | Performed by: NURSE PRACTITIONER

## 2023-09-05 PROCEDURE — 3077F SYST BP >= 140 MM HG: CPT | Performed by: NURSE PRACTITIONER

## 2023-09-05 PROCEDURE — G0439 PPPS, SUBSEQ VISIT: HCPCS | Performed by: NURSE PRACTITIONER

## 2023-09-05 NOTE — PROGRESS NOTES
Medicare Annual Wellness Visit    Estela Perdue is here for Medicare AWV (Pt is here for annual wellness visit. No concerns. Pt is fasting. )    Assessment & Plan   Medicare annual wellness visit, subsequent  Encounter for screening for lipid disorder  -     Lipid Panel  Screening for diabetes mellitus  -     Comprehensive Metabolic Panel  Screening PSA (prostate specific antigen)  -     PSA Screening  Lymphoma of lymph nodes of multiple regions, unspecified lymphoma type [facility])  Atrial fibrillation, unspecified type ([address])    Recommendations for Preventive Services Due: see orders and patient instructions/AVS.  Recommended screening schedule for the next 5-10 years is provided to the patient in written form: see Patient Instructions/AVS.     Return in about 1 year (around 9/5/2024) for maw. Subjective   The following acute and/or chronic problems were also addressed today:  He is seeing [facility]ology at [facility]. Has scans coming up for yearly. Lastcbc was good. He is feeling better since chemo over. Patient's complete Health Risk Assessment and screening values have been reviewed and are found in Flowsheets. The following problems were reviewed today and where indicated follow up appointments were made and/or referrals ordered. Positive Risk Factor Screenings with Interventions:                    Vision Screen:  Do you have difficulty driving, watching TV, or doing any of your daily activities because of your eyesight?: No  Have you had an eye exam within the past year?: (!) No  No results found. Interventions:   Patient declines any further evaluation or treatment        LDCT Screening: Discussed with patient the benefits and harms of screening, follow-up diagnostic testing, over-diagnosis, false positive rate, and total radiation exposure. Counseled on the importance of adherence to annual lung cancer LDCT screening, impact of comorbidities, ability and willingness to undergo diagnosis and treatment.

## 2023-09-07 ENCOUNTER — HOSPITAL ENCOUNTER (OUTPATIENT)
Dept: INFUSION THERAPY | Age: 73
Discharge: HOME | End: 2023-09-07
Payer: MEDICARE

## 2023-09-07 ENCOUNTER — HOSPITAL ENCOUNTER (OUTPATIENT)
Dept: CT IMAGING | Age: 73
Discharge: HOME | End: 2023-09-07
Payer: MEDICARE

## 2023-09-07 DIAGNOSIS — C85.98 LYMPHOMA OF LYMPH NODES OF MULTIPLE SITES (HCC): ICD-10-CM

## 2023-09-07 DIAGNOSIS — Z45.2 ENCOUNTER FOR CENTRAL LINE CARE: Primary | ICD-10-CM

## 2023-09-07 DIAGNOSIS — C83.32 DIFFUSE LARGE B-CELL LYMPHOMA OF INTRATHORACIC LYMPH NODES (HCC): ICD-10-CM

## 2023-09-07 PROCEDURE — 74177 CT ABD & PELVIS W/CONTRAST: CPT

## 2023-09-07 PROCEDURE — 71260 CT THORAX DX C+: CPT

## 2023-09-07 PROCEDURE — 6360000004 HC RX CONTRAST MEDICATION: Performed by: INTERNAL MEDICINE

## 2023-09-07 PROCEDURE — 6360000002 HC RX W HCPCS: Performed by: INTERNAL MEDICINE

## 2023-09-07 PROCEDURE — 2580000003 HC RX 258: Performed by: INTERNAL MEDICINE

## 2023-09-07 PROCEDURE — 96523 IRRIG DRUG DELIVERY DEVICE: CPT

## 2023-09-07 RX ORDER — SODIUM CHLORIDE 0.9 % (FLUSH) 0.9 %
5-40 SYRINGE (ML) INJECTION PRN
Status: DISCONTINUED | OUTPATIENT
Start: 2023-09-07 | End: 2023-09-08 | Stop reason: HOSPADM

## 2023-09-07 RX ORDER — HEPARIN 100 UNIT/ML
500 SYRINGE INTRAVENOUS PRN
OUTPATIENT
Start: 2023-09-07

## 2023-09-07 RX ORDER — SODIUM CHLORIDE 0.9 % (FLUSH) 0.9 %
5-40 SYRINGE (ML) INJECTION PRN
OUTPATIENT
Start: 2023-09-07

## 2023-09-07 RX ORDER — HEPARIN 100 UNIT/ML
500 SYRINGE INTRAVENOUS PRN
Status: DISCONTINUED | OUTPATIENT
Start: 2023-09-07 | End: 2023-09-08 | Stop reason: HOSPADM

## 2023-09-07 RX ADMIN — HEPARIN 500 UNITS: 100 SYRINGE at 10:28

## 2023-09-07 RX ADMIN — IOPAMIDOL 75 ML: 755 INJECTION, SOLUTION INTRAVENOUS at 10:30

## 2023-09-07 RX ADMIN — SODIUM CHLORIDE, PRESERVATIVE FREE 10 ML: 5 INJECTION INTRAVENOUS at 10:28

## 2023-09-07 NOTE — PROGRESS NOTES
status:Former smoker, Quit more 25 years ago  ETOH status:No   Resides:[place]    Family History:   Family History   Problem Relation Age of Onset    Cancer Mother         lymphoma    Heart Disease Father        Current Hospital Medications:    Current Outpatient Medications   Medication Sig Dispense Refill    XARELTO 20 MG TABS tablet TAKE 1 TABLET EVERY DAY WITH BREAKFAST 90 tablet 3    dilTIAZem (CARDIZEM CD) 240 MG extended release capsule Take 1 capsule by mouth daily 90 capsule 3    nystatin (MYCOSTATIN) 747911 UNIT/GM ointment APPLY OINTMENT TOPICALLY TWICE DAILY      lidocaine-prilocaine (EMLA) 2.5-2.5 % cream Apply topically to port area and cover with plastic wrap one hour prior to treatment. 1 each 1    ferrous sulfate (IRON 325) 325 (65 Fe) MG tablet Take 1 tablet by mouth daily (with breakfast)      KRILL OIL PO Take by mouth      Red Yeast Rice Extract (RED YEAST RICE PO) Take by mouth      Probiotic Product (PROBIOTIC-10 PO) Take by mouth      Zn-Pyg Afri-Nettle-Saw Palmet (SAW PALMETTO COMPLEX PO) Take by mouth      Multiple Vitamins-Minerals (THERAPEUTIC MULTIVITAMIN-MINERALS) tablet Take 1 tablet by mouth daily Red Eye      Vitamin D (CHOLECALCIFEROL) 1000 UNITS CAPS capsule Take 1 capsule by mouth daily      niacin 500 MG CR capsule Take 1 capsule by mouth nightly       No current facility-administered medications for this visit.        Allergies: No Known Allergies      Subjective   REVIEW OF SYSTEMS:   CONSTITUTIONAL: no fever, no night sweats,  fatigue;  HEENT: no blurring of vision, no double vision, no hearing difficulty, no tinnitus, no ulceration, no dysplasia, no epistaxis;   LUNGS: no cough, no hemoptysis, no wheeze,  no shortness of breath;  CARDIOVASCULAR: no palpitation, no chest pain, no shortness of breath;  GI: no abdominal pain, no nausea, no vomiting, no diarrhea, no constipation;  KARINA: no dysuria, no hematuria, no frequency or urgency, no nephrolithiasis;  MUSCULOSKELETAL: joint

## 2023-09-08 ENCOUNTER — TELEPHONE (OUTPATIENT)
Dept: HEMATOLOGY | Age: 73
End: 2023-09-08

## 2023-09-08 DIAGNOSIS — C83.32 DIFFUSE LARGE B-CELL LYMPHOMA OF INTRATHORACIC LYMPH NODES (HCC): Primary | ICD-10-CM

## 2023-09-11 ENCOUNTER — HOSPITAL ENCOUNTER (OUTPATIENT)
Dept: INFUSION THERAPY | Age: 73
Discharge: HOME | End: 2023-09-11
Payer: MEDICARE

## 2023-09-11 ENCOUNTER — OFFICE VISIT (OUTPATIENT)
Dept: HEMATOLOGY | Age: 73
End: 2023-09-11
Payer: MEDICARE

## 2023-09-11 VITALS
BODY MASS INDEX: 28.08 KG/M2 | SYSTOLIC BLOOD PRESSURE: 140 MMHG | DIASTOLIC BLOOD PRESSURE: 78 MMHG | OXYGEN SATURATION: 99 % | HEIGHT: 67 IN | WEIGHT: 178.9 LBS | HEART RATE: 76 BPM

## 2023-09-11 DIAGNOSIS — C83.32 DIFFUSE LARGE B-CELL LYMPHOMA OF INTRATHORACIC LYMPH NODES (HCC): ICD-10-CM

## 2023-09-11 DIAGNOSIS — Z08 ENCOUNTER FOR FOLLOW-UP SURVEILLANCE OF DIFFUSE LARGE B-CELL LYMPHOMA: ICD-10-CM

## 2023-09-11 DIAGNOSIS — J84.9 INTERSTITIAL LUNG DISEASE (HCC): ICD-10-CM

## 2023-09-11 DIAGNOSIS — Z85.79 ENCOUNTER FOR FOLLOW-UP SURVEILLANCE OF DIFFUSE LARGE B-CELL LYMPHOMA: ICD-10-CM

## 2023-09-11 DIAGNOSIS — Z71.89 CARE PLAN DISCUSSED WITH PATIENT: ICD-10-CM

## 2023-09-11 DIAGNOSIS — C85.98 LYMPHOMA OF LYMPH NODES OF MULTIPLE SITES (HCC): Primary | ICD-10-CM

## 2023-09-11 LAB
BASOPHILS # BLD: 0.04 K/UL (ref 0.01–0.08)
BASOPHILS NFR BLD: 0.7 % (ref 0.1–1.2)
EOSINOPHIL # BLD: 0.08 K/UL (ref 0.04–0.54)
EOSINOPHIL NFR BLD: 1.3 % (ref 0.7–7)
ERYTHROCYTE [DISTWIDTH] IN BLOOD BY AUTOMATED COUNT: 13.6 % (ref 11.6–14.4)
HCT VFR BLD AUTO: 42.1 % (ref 40.1–51)
HGB BLD-MCNC: 15.1 G/DL (ref 13.7–17.5)
LYMPHOCYTES # BLD: 1.01 K/UL (ref 1.18–3.74)
LYMPHOCYTES NFR BLD: 16.7 % (ref 19.3–53.1)
MCH RBC QN AUTO: 31.7 PG (ref 25.7–32.2)
MCHC RBC AUTO-ENTMCNC: 35.9 G/DL (ref 32.3–36.5)
MCV RBC AUTO: 88.4 FL (ref 79–92.2)
MONOCYTES # BLD: 0.54 K/UL (ref 0.24–0.82)
MONOCYTES NFR BLD: 8.9 % (ref 4.7–12.5)
NEUTROPHILS # BLD: 4.32 K/UL (ref 1.56–6.13)
NEUTS SEG NFR BLD: 71.4 % (ref 34–71.1)
PLATELET # BLD AUTO: 163 K/UL (ref 163–337)
PMV BLD AUTO: 10.2 FL (ref 7.4–10.4)
RBC # BLD AUTO: 4.76 M/UL (ref 4.63–6.08)
WBC # BLD AUTO: 6.05 K/UL (ref 4.23–9.07)

## 2023-09-11 PROCEDURE — 1036F TOBACCO NON-USER: CPT | Performed by: INTERNAL MEDICINE

## 2023-09-11 PROCEDURE — 3077F SYST BP >= 140 MM HG: CPT | Performed by: INTERNAL MEDICINE

## 2023-09-11 PROCEDURE — G8427 DOCREV CUR MEDS BY ELIG CLIN: HCPCS | Performed by: INTERNAL MEDICINE

## 2023-09-11 PROCEDURE — 99213 OFFICE O/P EST LOW 20 MIN: CPT | Performed by: INTERNAL MEDICINE

## 2023-09-11 PROCEDURE — 99212 OFFICE O/P EST SF 10 MIN: CPT

## 2023-09-11 PROCEDURE — 36415 COLL VENOUS BLD VENIPUNCTURE: CPT

## 2023-09-11 PROCEDURE — 1123F ACP DISCUSS/DSCN MKR DOCD: CPT | Performed by: INTERNAL MEDICINE

## 2023-09-11 PROCEDURE — 3078F DIAST BP <80 MM HG: CPT | Performed by: INTERNAL MEDICINE

## 2023-09-11 PROCEDURE — G8417 CALC BMI ABV UP PARAM F/U: HCPCS | Performed by: INTERNAL MEDICINE

## 2023-09-11 PROCEDURE — 85025 COMPLETE CBC W/AUTO DIFF WBC: CPT

## 2023-09-11 PROCEDURE — 3017F COLORECTAL CA SCREEN DOC REV: CPT | Performed by: INTERNAL MEDICINE

## 2023-11-06 ENCOUNTER — HOSPITAL ENCOUNTER (OUTPATIENT)
Dept: INFUSION THERAPY | Age: 73
Discharge: HOME | End: 2023-11-06
Payer: MEDICARE

## 2023-11-06 DIAGNOSIS — Z45.2 ENCOUNTER FOR CENTRAL LINE CARE: Primary | ICD-10-CM

## 2023-11-06 PROCEDURE — 96523 IRRIG DRUG DELIVERY DEVICE: CPT

## 2023-11-06 PROCEDURE — 6360000002 HC RX W HCPCS: Performed by: INTERNAL MEDICINE

## 2023-11-06 PROCEDURE — 2580000003 HC RX 258: Performed by: INTERNAL MEDICINE

## 2023-11-06 RX ORDER — SODIUM CHLORIDE 0.9 % (FLUSH) 0.9 %
5-40 SYRINGE (ML) INJECTION PRN
Status: DISCONTINUED | OUTPATIENT
Start: 2023-11-06 | End: 2023-11-07 | Stop reason: HOSPADM

## 2023-11-06 RX ORDER — HEPARIN 100 UNIT/ML
500 SYRINGE INTRAVENOUS PRN
Status: DISCONTINUED | OUTPATIENT
Start: 2023-11-06 | End: 2023-11-07 | Stop reason: HOSPADM

## 2023-11-06 RX ADMIN — SODIUM CHLORIDE, PRESERVATIVE FREE 10 ML: 5 INJECTION INTRAVENOUS at 09:00

## 2023-11-06 RX ADMIN — HEPARIN 500 UNITS: 100 SYRINGE at 09:00

## 2024-01-08 ENCOUNTER — HOSPITAL ENCOUNTER (OUTPATIENT)
Dept: INFUSION THERAPY | Age: 74
Discharge: HOME | End: 2024-01-08
Payer: MEDICARE

## 2024-01-08 DIAGNOSIS — Z45.2 ENCOUNTER FOR CENTRAL LINE CARE: Primary | ICD-10-CM

## 2024-01-08 DIAGNOSIS — C85.98 LYMPHOMA OF LYMPH NODES OF MULTIPLE SITES (HCC): ICD-10-CM

## 2024-01-08 DIAGNOSIS — I87.8 POOR VENOUS ACCESS: ICD-10-CM

## 2024-01-08 PROCEDURE — 96523 IRRIG DRUG DELIVERY DEVICE: CPT

## 2024-01-08 PROCEDURE — 6360000002 HC RX W HCPCS: Performed by: INTERNAL MEDICINE

## 2024-01-08 PROCEDURE — 2580000003 HC RX 258: Performed by: INTERNAL MEDICINE

## 2024-01-08 RX ORDER — SODIUM CHLORIDE 0.9 % (FLUSH) 0.9 %
5-40 SYRINGE (ML) INJECTION PRN
Status: DISCONTINUED | OUTPATIENT
Start: 2024-01-08 | End: 2024-01-09 | Stop reason: HOSPADM

## 2024-01-08 RX ORDER — HEPARIN 100 UNIT/ML
500 SYRINGE INTRAVENOUS PRN
Status: DISCONTINUED | OUTPATIENT
Start: 2024-01-08 | End: 2024-01-09 | Stop reason: HOSPADM

## 2024-01-08 RX ORDER — LIDOCAINE AND PRILOCAINE 25; 25 MG/G; MG/G
CREAM TOPICAL
Qty: 30 G | Refills: 0 | Status: SHIPPED | OUTPATIENT
Start: 2024-01-08

## 2024-01-08 RX ADMIN — SODIUM CHLORIDE, PRESERVATIVE FREE 10 ML: 5 INJECTION INTRAVENOUS at 09:23

## 2024-01-08 RX ADMIN — HEPARIN 500 UNITS: 100 SYRINGE at 09:23

## 2024-02-20 ENCOUNTER — PATIENT MESSAGE (OUTPATIENT)
Dept: PRIMARY CARE CLINIC | Age: 74
End: 2024-02-20

## 2024-02-20 RX ORDER — DILTIAZEM HYDROCHLORIDE 240 MG/1
240 CAPSULE, COATED, EXTENDED RELEASE ORAL DAILY
Qty: 90 CAPSULE | Refills: 3 | Status: SHIPPED | OUTPATIENT
Start: 2024-02-20

## 2024-02-20 NOTE — TELEPHONE ENCOUNTER
From: Sage Partida  To: Pia Kumari  Sent: 2/20/2024 3:57 PM CST  Subject: Tongue     Am I going to have to fight this the rest of my days?

## 2024-03-04 ENCOUNTER — HOSPITAL ENCOUNTER (OUTPATIENT)
Dept: CT IMAGING | Age: 74
Discharge: HOME | End: 2024-03-04
Payer: MEDICARE

## 2024-03-04 ENCOUNTER — HOSPITAL ENCOUNTER (OUTPATIENT)
Dept: INFUSION THERAPY | Age: 74
Discharge: HOME | End: 2024-03-04
Payer: MEDICARE

## 2024-03-04 DIAGNOSIS — C85.98 LYMPHOMA OF LYMPH NODES OF MULTIPLE SITES (HCC): ICD-10-CM

## 2024-03-04 DIAGNOSIS — Z45.2 ENCOUNTER FOR CENTRAL LINE CARE: Primary | ICD-10-CM

## 2024-03-04 LAB — CREAT SERPL-MCNC: 1 MG/DL (ref 0.3–1.3)

## 2024-03-04 PROCEDURE — 71260 CT THORAX DX C+: CPT

## 2024-03-04 PROCEDURE — 70491 CT SOFT TISSUE NECK W/DYE: CPT

## 2024-03-04 PROCEDURE — 74177 CT ABD & PELVIS W/CONTRAST: CPT

## 2024-03-04 PROCEDURE — 6360000004 HC RX CONTRAST MEDICATION: Performed by: INTERNAL MEDICINE

## 2024-03-04 PROCEDURE — 2580000003 HC RX 258: Performed by: INTERNAL MEDICINE

## 2024-03-04 PROCEDURE — 82565 ASSAY OF CREATININE: CPT

## 2024-03-04 PROCEDURE — 96523 IRRIG DRUG DELIVERY DEVICE: CPT

## 2024-03-04 PROCEDURE — 6360000002 HC RX W HCPCS: Performed by: INTERNAL MEDICINE

## 2024-03-04 RX ORDER — SODIUM CHLORIDE 0.9 % (FLUSH) 0.9 %
5-40 SYRINGE (ML) INJECTION PRN
Status: DISCONTINUED | OUTPATIENT
Start: 2024-03-04 | End: 2024-03-05 | Stop reason: HOSPADM

## 2024-03-04 RX ORDER — HEPARIN 100 UNIT/ML
500 SYRINGE INTRAVENOUS PRN
Status: DISCONTINUED | OUTPATIENT
Start: 2024-03-04 | End: 2024-03-05 | Stop reason: HOSPADM

## 2024-03-04 RX ADMIN — SODIUM CHLORIDE, PRESERVATIVE FREE 10 ML: 5 INJECTION INTRAVENOUS at 10:43

## 2024-03-04 RX ADMIN — HEPARIN 500 UNITS: 100 SYRINGE at 10:43

## 2024-03-04 RX ADMIN — IOPAMIDOL 75 ML: 755 INJECTION, SOLUTION INTRAVENOUS at 12:10

## 2024-03-07 ENCOUNTER — TELEPHONE (OUTPATIENT)
Dept: HEMATOLOGY | Age: 74
End: 2024-03-07

## 2024-03-07 DIAGNOSIS — C83.32 DIFFUSE LARGE B-CELL LYMPHOMA OF INTRATHORACIC LYMPH NODES (HCC): Primary | ICD-10-CM

## 2024-03-07 NOTE — PROGRESS NOTES
extremities, no focal weakness.  SKIN: warm, dry with no rashes or lesions  LYMPH: No cervical, clavicular, axillary, or inguinal lymphadenopathy  NEUROLOGIC: follows commands, non focal     LABORATORY RESULTS REVIEWED/ANALYZED BY ME:  Lab Results   Component Value Date    WBC 6.07 03/11/2024    HGB 15.2 03/11/2024    HCT 44.3 03/11/2024    MCV 93.1 (H) 03/11/2024     (L) 03/11/2024     Lab Results   Component Value Date    NEUTROABS 4.19 03/11/2024     RADIOLOGY STUDIES REPORT REVIEWED/INTERPRETED BY ME:  3/4/24 CT Chest W Contrast No evidence of metastatic disease within the chest.  Small physiologic mediastinal lymph nodes appear stable. Right chest port well positioned. Stable fibrotic changes of interstitial lung disease.     3/4/24 CT Abd/Pelvis W IV Contrast  Left retroperitoneal lymphadenopathy on image 31 measures 2.3 x 1.2 cm diameter. This is stable. Left periaortic lymph node on image 35 stable at 8 mm AP dimension. No new or increasing lymphadenopathy. Stable left retroperitoneal lymphadenopathy. Benign appearing hepatic and renal cysts.  Cholelithiasis.     3/4/24 CT Soft Tissue Neck W Contrast No abnormal enhancing mass, fluid collection, or cervical lymphadenopathy. There are subcentimeter lymph nodes scattered throughout the neck bilaterally, none pathologically enlarged.  No abnormal enhancing mass, fluid collection, or cervical lymphadenopathy.        ASSESSMENT:  #Diffuse large B-cell lymphoma, stage IIIB, ABC phenotype (c-Myc(-), BCL-2 (+), BCL6(-)rearrangement)  -3/28/2022-excisional node biopsy by Dr. Ga Perea, [facility].  -Path consistent with ABC phenotype DLBCL: Lymphoma as per Dr. Jaimes.   -PET scan and bone marrow biopsy-ordered today on 4/4/2022.  -Persistent B symptoms.  - PET scan/bone marrow biopsy on 4/4/2022.(Negative for lymphoma involvement)  Extensive lymphadenopathy consistent with lymphoma. Many of these nodes are extremely metabolically active including SUV

## 2024-03-07 NOTE — TELEPHONE ENCOUNTER
Called pt. to remind them of appointment on 03/11/2024 and had to leave a detailed voicemail with appointment date and time.

## 2024-03-11 ENCOUNTER — OFFICE VISIT (OUTPATIENT)
Dept: HEMATOLOGY | Age: 74
End: 2024-03-11
Payer: MEDICARE

## 2024-03-11 ENCOUNTER — HOSPITAL ENCOUNTER (OUTPATIENT)
Dept: INFUSION THERAPY | Age: 74
Discharge: HOME | End: 2024-03-11
Payer: MEDICARE

## 2024-03-11 VITALS
HEIGHT: 67 IN | WEIGHT: 179.6 LBS | BODY MASS INDEX: 28.19 KG/M2 | HEART RATE: 83 BPM | SYSTOLIC BLOOD PRESSURE: 144 MMHG | OXYGEN SATURATION: 98 % | DIASTOLIC BLOOD PRESSURE: 90 MMHG | TEMPERATURE: 99.1 F

## 2024-03-11 DIAGNOSIS — Z85.79 ENCOUNTER FOR FOLLOW-UP SURVEILLANCE OF DIFFUSE LARGE B-CELL LYMPHOMA: ICD-10-CM

## 2024-03-11 DIAGNOSIS — C83.32 DIFFUSE LARGE B-CELL LYMPHOMA OF INTRATHORACIC LYMPH NODES (HCC): ICD-10-CM

## 2024-03-11 DIAGNOSIS — Z85.72 ENCOUNTER FOR FOLLOW-UP SURVEILLANCE OF LYMPHOMA: ICD-10-CM

## 2024-03-11 DIAGNOSIS — Z71.89 CARE PLAN DISCUSSED WITH PATIENT: ICD-10-CM

## 2024-03-11 DIAGNOSIS — C83.32 DIFFUSE LARGE B-CELL LYMPHOMA OF INTRATHORACIC LYMPH NODES (HCC): Primary | ICD-10-CM

## 2024-03-11 DIAGNOSIS — Z08 ENCOUNTER FOR FOLLOW-UP SURVEILLANCE OF DIFFUSE LARGE B-CELL LYMPHOMA: ICD-10-CM

## 2024-03-11 DIAGNOSIS — Z08 ENCOUNTER FOR FOLLOW-UP SURVEILLANCE OF LYMPHOMA: ICD-10-CM

## 2024-03-11 LAB
ALBUMIN SERPL-MCNC: 4.6 G/DL (ref 3.5–5.2)
ALP SERPL-CCNC: 67 U/L (ref 40–130)
ALT SERPL-CCNC: 26 U/L (ref 21–72)
ANION GAP SERPL CALCULATED.3IONS-SCNC: 11 MMOL/L (ref 7–19)
AST SERPL-CCNC: 29 U/L (ref 17–59)
BASOPHILS # BLD: 0.02 K/UL (ref 0.01–0.08)
BASOPHILS NFR BLD: 0.3 % (ref 0.1–1.2)
BILIRUB SERPL-MCNC: 0.5 MG/DL (ref 0.2–1.3)
BUN SERPL-MCNC: 19 MG/DL (ref 9–20)
CALCIUM SERPL-MCNC: 9.3 MG/DL (ref 8.4–10.2)
CHLORIDE SERPL-SCNC: 97 MMOL/L (ref 98–111)
CO2 SERPL-SCNC: 29 MMOL/L (ref 22–29)
CREAT SERPL-MCNC: 0.9 MG/DL (ref 0.6–1.2)
EOSINOPHIL # BLD: 0.05 K/UL (ref 0.04–0.54)
EOSINOPHIL NFR BLD: 0.8 % (ref 0.7–7)
ERYTHROCYTE [DISTWIDTH] IN BLOOD BY AUTOMATED COUNT: 13.2 % (ref 11.6–14.4)
GLOBULIN: 2.9 G/DL
GLUCOSE SERPL-MCNC: 123 MG/DL (ref 74–106)
HCT VFR BLD AUTO: 44.3 % (ref 40.1–51)
HGB BLD-MCNC: 15.2 G/DL (ref 13.7–17.5)
LDH SERPL-CCNC: 169 U/L (ref 120–246)
LYMPHOCYTES # BLD: 1.4 K/UL (ref 1.18–3.74)
LYMPHOCYTES NFR BLD: 23.1 % (ref 19.3–53.1)
MCH RBC QN AUTO: 31.9 PG (ref 25.7–32.2)
MCHC RBC AUTO-ENTMCNC: 34.3 G/DL (ref 32.3–36.5)
MCV RBC AUTO: 93.1 FL (ref 79–92.2)
MONOCYTES # BLD: 0.4 K/UL (ref 0.24–0.82)
MONOCYTES NFR BLD: 6.6 % (ref 4.7–12.5)
NEUTROPHILS # BLD: 4.19 K/UL (ref 1.56–6.13)
NEUTS SEG NFR BLD: 69 % (ref 34–71.1)
PLATELET # BLD AUTO: 153 K/UL (ref 163–337)
PMV BLD AUTO: 9.4 FL (ref 7.4–10.4)
POTASSIUM SERPL-SCNC: 4.3 MMOL/L (ref 3.5–5.1)
PROT SERPL-MCNC: 7.5 G/DL (ref 6.3–8.2)
RBC # BLD AUTO: 4.76 M/UL (ref 4.63–6.08)
SODIUM SERPL-SCNC: 137 MMOL/L (ref 137–145)
URATE SERPL-MCNC: 3.9 MG/DL (ref 3.5–8.5)
WBC # BLD AUTO: 6.07 K/UL (ref 4.23–9.07)

## 2024-03-11 PROCEDURE — 83615 LACTATE (LD) (LDH) ENZYME: CPT

## 2024-03-11 PROCEDURE — 3017F COLORECTAL CA SCREEN DOC REV: CPT | Performed by: INTERNAL MEDICINE

## 2024-03-11 PROCEDURE — 80053 COMPREHEN METABOLIC PANEL: CPT

## 2024-03-11 PROCEDURE — G8417 CALC BMI ABV UP PARAM F/U: HCPCS | Performed by: INTERNAL MEDICINE

## 2024-03-11 PROCEDURE — 99213 OFFICE O/P EST LOW 20 MIN: CPT | Performed by: INTERNAL MEDICINE

## 2024-03-11 PROCEDURE — G8484 FLU IMMUNIZE NO ADMIN: HCPCS | Performed by: INTERNAL MEDICINE

## 2024-03-11 PROCEDURE — 36415 COLL VENOUS BLD VENIPUNCTURE: CPT

## 2024-03-11 PROCEDURE — 84550 ASSAY OF BLOOD/URIC ACID: CPT

## 2024-03-11 PROCEDURE — 3077F SYST BP >= 140 MM HG: CPT | Performed by: INTERNAL MEDICINE

## 2024-03-11 PROCEDURE — 1123F ACP DISCUSS/DSCN MKR DOCD: CPT | Performed by: INTERNAL MEDICINE

## 2024-03-11 PROCEDURE — 99212 OFFICE O/P EST SF 10 MIN: CPT

## 2024-03-11 PROCEDURE — 1036F TOBACCO NON-USER: CPT | Performed by: INTERNAL MEDICINE

## 2024-03-11 PROCEDURE — 3080F DIAST BP >= 90 MM HG: CPT | Performed by: INTERNAL MEDICINE

## 2024-03-11 PROCEDURE — G8427 DOCREV CUR MEDS BY ELIG CLIN: HCPCS | Performed by: INTERNAL MEDICINE

## 2024-03-11 PROCEDURE — 85025 COMPLETE CBC W/AUTO DIFF WBC: CPT

## 2024-03-11 PROCEDURE — G2211 COMPLEX E/M VISIT ADD ON: HCPCS | Performed by: INTERNAL MEDICINE

## 2024-05-02 ENCOUNTER — OFFICE VISIT (OUTPATIENT)
Dept: PRIMARY CARE CLINIC | Age: 74
End: 2024-05-02

## 2024-05-02 VITALS
DIASTOLIC BLOOD PRESSURE: 82 MMHG | OXYGEN SATURATION: 97 % | BODY MASS INDEX: 28.09 KG/M2 | SYSTOLIC BLOOD PRESSURE: 138 MMHG | HEIGHT: 67 IN | WEIGHT: 179 LBS | RESPIRATION RATE: 18 BRPM | HEART RATE: 75 BPM | TEMPERATURE: 97.6 F

## 2024-05-02 DIAGNOSIS — Z00.00 NORMAL TONGUE EXAM: Primary | ICD-10-CM

## 2024-05-02 SDOH — ECONOMIC STABILITY: INCOME INSECURITY: HOW HARD IS IT FOR YOU TO PAY FOR THE VERY BASICS LIKE FOOD, HOUSING, MEDICAL CARE, AND HEATING?: NOT VERY HARD

## 2024-05-02 SDOH — ECONOMIC STABILITY: FOOD INSECURITY: WITHIN THE PAST 12 MONTHS, YOU WORRIED THAT YOUR FOOD WOULD RUN OUT BEFORE YOU GOT MONEY TO BUY MORE.: NEVER TRUE

## 2024-05-02 SDOH — ECONOMIC STABILITY: FOOD INSECURITY: WITHIN THE PAST 12 MONTHS, THE FOOD YOU BOUGHT JUST DIDN'T LAST AND YOU DIDN'T HAVE MONEY TO GET MORE.: NEVER TRUE

## 2024-05-02 ASSESSMENT — PATIENT HEALTH QUESTIONNAIRE - PHQ9
SUM OF ALL RESPONSES TO PHQ QUESTIONS 1-9: 0
2. FEELING DOWN, DEPRESSED OR HOPELESS: NOT AT ALL
1. LITTLE INTEREST OR PLEASURE IN DOING THINGS: NOT AT ALL
SUM OF ALL RESPONSES TO PHQ QUESTIONS 1-9: 0
SUM OF ALL RESPONSES TO PHQ9 QUESTIONS 1 & 2: 0

## 2024-05-02 NOTE — PROGRESS NOTES
[facility]  [facility]  [address] [place] [address]  Dept: [phone]  Dept Fax: [phone]  Loc: [phone]    Sage Partida is a 73 y.o. male who presents today for his medical conditions/complaints as noted below.  Sage Partida is c/o of Follow-up (Still with abnormal color to his tongue.  After finished chemo treatments.  Has noticed bumps.  Hx of yeast issues for years and feels that part has cleared up but some reason his tongue has been acting up.  Wax/wanes for the past year in a half.  )        HPI:     HPI   Chief Complaint   Patient presents with    Follow-up     Still with abnormal color to his tongue.  After finished chemo treatments.  Has noticed bumps.  Hx of yeast issues for years and feels that part has cleared up but some reason his tongue has been acting up.  Wax/wanes for the past year in a half.     He denies any burning to his tongue he is not using inhalers or any recent antibiotics.  He just notices abnormal changed when he took his dentures out it was looking in his mouth.  It is not sore it does not hurt him he does not have trouble swallowing  Past Medical History:   Diagnosis Date    Cancer (HCC)     lymphoma    Chronic neck pain     Herniated disc, cervical     HTN (hypertension)       Past Surgical History:   Procedure Laterality Date    DENTAL SURGERY      LYMPH NODE BIOPSY      left side of neck    PORT SURGERY N/A 04/08/2022    SINGLE LUMEN PORT PLACEMENT WITH US & FLUORO performed by Rossi Chavez MD at [facility] OR           5/2/2024     1:39 PM 3/11/2024     9:07 AM 9/11/2023     9:04 AM 9/5/2023     8:49 AM 9/5/2023     8:22 AM 6/7/2023     8:51 AM   Vitals   SYSTOLIC 138 144 140 140 160 138   DIASTOLIC 82 90 78 78 86 86   Site Right Upper Arm Left Upper Arm   Left Upper Arm    Position Sitting Sitting   Sitting    Cuff Size Large Adult    Medium Adult    Pulse 75 83 76  77 91   Temp 97.6 °F (36.4 °C) 99.1 °F (37.3 °C)   97.2 °F

## 2024-05-06 ENCOUNTER — HOSPITAL ENCOUNTER (OUTPATIENT)
Dept: INFUSION THERAPY | Age: 74
Discharge: HOME | End: 2024-05-06
Payer: MEDICARE

## 2024-05-06 DIAGNOSIS — Z45.2 ENCOUNTER FOR CENTRAL LINE CARE: Primary | ICD-10-CM

## 2024-05-06 PROCEDURE — 2580000003 HC RX 258: Performed by: INTERNAL MEDICINE

## 2024-05-06 PROCEDURE — 6360000002 HC RX W HCPCS: Performed by: INTERNAL MEDICINE

## 2024-05-06 PROCEDURE — 96523 IRRIG DRUG DELIVERY DEVICE: CPT

## 2024-05-06 RX ORDER — SODIUM CHLORIDE 0.9 % (FLUSH) 0.9 %
5-40 SYRINGE (ML) INJECTION PRN
Status: DISCONTINUED | OUTPATIENT
Start: 2024-05-06 | End: 2024-05-07 | Stop reason: HOSPADM

## 2024-05-06 RX ORDER — HEPARIN 100 UNIT/ML
500 SYRINGE INTRAVENOUS PRN
Status: DISCONTINUED | OUTPATIENT
Start: 2024-05-06 | End: 2024-05-07 | Stop reason: HOSPADM

## 2024-05-06 RX ADMIN — SODIUM CHLORIDE, PRESERVATIVE FREE 10 ML: 5 INJECTION INTRAVENOUS at 09:03

## 2024-05-06 RX ADMIN — HEPARIN 500 UNITS: 100 SYRINGE at 09:03

## 2024-06-11 ENCOUNTER — OFFICE VISIT (OUTPATIENT)
Dept: CARDIOLOGY CLINIC | Age: 74
End: 2024-06-11
Payer: MEDICARE

## 2024-06-11 VITALS
WEIGHT: 177 LBS | HEIGHT: 67 IN | DIASTOLIC BLOOD PRESSURE: 84 MMHG | BODY MASS INDEX: 27.78 KG/M2 | HEART RATE: 79 BPM | SYSTOLIC BLOOD PRESSURE: 134 MMHG

## 2024-06-11 DIAGNOSIS — I48.92 ATRIAL FLUTTER WITH RAPID VENTRICULAR RESPONSE (HCC): Primary | ICD-10-CM

## 2024-06-11 DIAGNOSIS — R09.89 LEFT CAROTID BRUIT: ICD-10-CM

## 2024-06-11 DIAGNOSIS — I48.0 PAROXYSMAL ATRIAL FIBRILLATION (HCC): ICD-10-CM

## 2024-06-11 DIAGNOSIS — R06.02 SHORTNESS OF BREATH: Primary | ICD-10-CM

## 2024-06-11 DIAGNOSIS — R42 DIZZINESS: ICD-10-CM

## 2024-06-11 PROCEDURE — 3079F DIAST BP 80-89 MM HG: CPT | Performed by: INTERNAL MEDICINE

## 2024-06-11 PROCEDURE — 93880 EXTRACRANIAL BILAT STUDY: CPT | Performed by: INTERNAL MEDICINE

## 2024-06-11 PROCEDURE — 3017F COLORECTAL CA SCREEN DOC REV: CPT | Performed by: INTERNAL MEDICINE

## 2024-06-11 PROCEDURE — 1036F TOBACCO NON-USER: CPT | Performed by: INTERNAL MEDICINE

## 2024-06-11 PROCEDURE — G8427 DOCREV CUR MEDS BY ELIG CLIN: HCPCS | Performed by: INTERNAL MEDICINE

## 2024-06-11 PROCEDURE — 1123F ACP DISCUSS/DSCN MKR DOCD: CPT | Performed by: INTERNAL MEDICINE

## 2024-06-11 PROCEDURE — 93000 ELECTROCARDIOGRAM COMPLETE: CPT | Performed by: INTERNAL MEDICINE

## 2024-06-11 PROCEDURE — 3075F SYST BP GE 130 - 139MM HG: CPT | Performed by: INTERNAL MEDICINE

## 2024-06-11 PROCEDURE — 99213 OFFICE O/P EST LOW 20 MIN: CPT | Performed by: INTERNAL MEDICINE

## 2024-06-11 PROCEDURE — G8417 CALC BMI ABV UP PARAM F/U: HCPCS | Performed by: INTERNAL MEDICINE

## 2024-06-11 NOTE — PROGRESS NOTES
Financial Resource Strain (CARDIA)     Difficulty of Paying Living Expenses: Not very hard   Food Insecurity: No Food Insecurity (5/2/2024)    Hunger Vital Sign     Worried About Running Out of Food in the Last Year: Never true     Ran Out of Food in the Last Year: Never true   Transportation Needs: Unknown (5/2/2024)    PRAPARE - Transportation     Lack of Transportation (Medical): Not on file     Lack of Transportation (Non-Medical): No   Physical Activity: Sufficiently Active (9/2/2023)    Exercise Vital Sign     Days of Exercise per Week: 7 days     Minutes of Exercise per Session: 40 min   Stress: Not on file   Social Connections: Not on file   Intimate Partner Violence: Not on file   Housing Stability: Unknown (5/2/2024)    Housing Stability Vital Sign     Unable to Pay for Housing in the Last Year: Not on file     Number of Places Lived in the Last Year: Not on file     Unstable Housing in the Last Year: No       Not on File      Current Outpatient Medications:     dilTIAZem (CARDIZEM CD) 240 MG extended release capsule, TAKE 1 CAPSULE EVERY DAY, Disp: 90 capsule, Rfl: 3    XARELTO 20 MG TABS tablet, TAKE 1 TABLET EVERY DAY WITH BREAKFAST, Disp: 90 tablet, Rfl: 3    nystatin (MYCOSTATIN) 055539 UNIT/GM ointment, APPLY OINTMENT TOPICALLY TWICE DAILY, Disp: , Rfl:     KRILL OIL PO, Take by mouth, Disp: , Rfl:     Red Yeast Rice Extract (RED YEAST RICE PO), Take by mouth, Disp: , Rfl:     Zn-Pyg Afri-Nettle-Saw Palmet (SAW PALMETTO COMPLEX PO), Take by mouth, Disp: , Rfl:     Multiple Vitamins-Minerals (THERAPEUTIC MULTIVITAMIN-MINERALS) tablet, Take 1 tablet by mouth daily Red Eye, Disp: , Rfl:     niacin 500 MG CR capsule, Take 1 capsule by mouth nightly, Disp: , Rfl:     lidocaine-prilocaine (EMLA) 2.5-2.5 % cream, APPLY TOPICALLY TO PORT AREA AND COVER WITH PLASTIC WRAP ONE HOUR PRIOR TO TREATMENT (Patient not taking: Reported on 6/11/2024), Disp: 30 g, Rfl: 0    ferrous sulfate (IRON 325) 325 (65 Fe) MG

## 2024-06-12 ENCOUNTER — TELEPHONE (OUTPATIENT)
Dept: CARDIOLOGY CLINIC | Age: 74
End: 2024-06-12

## 2024-06-12 NOTE — TELEPHONE ENCOUNTER
Called patient and was giving the ti, ddate, and location of testing and they wanted to cancel as they want it done closer to home. 6/12/24 AH

## 2024-07-01 ENCOUNTER — HOSPITAL ENCOUNTER (OUTPATIENT)
Dept: INFUSION THERAPY | Age: 74
Discharge: HOME | End: 2024-07-01
Payer: MEDICARE

## 2024-07-01 DIAGNOSIS — Z45.2 ENCOUNTER FOR CENTRAL LINE CARE: Primary | ICD-10-CM

## 2024-07-01 PROCEDURE — 2580000003 HC RX 258: Performed by: PHYSICIAN ASSISTANT

## 2024-07-01 PROCEDURE — 96523 IRRIG DRUG DELIVERY DEVICE: CPT

## 2024-07-01 PROCEDURE — 6360000002 HC RX W HCPCS: Performed by: PHYSICIAN ASSISTANT

## 2024-07-01 RX ORDER — HEPARIN 100 UNIT/ML
500 SYRINGE INTRAVENOUS PRN
Status: DISCONTINUED | OUTPATIENT
Start: 2024-07-01 | End: 2024-07-02 | Stop reason: HOSPADM

## 2024-07-01 RX ORDER — SODIUM CHLORIDE 0.9 % (FLUSH) 0.9 %
5-40 SYRINGE (ML) INJECTION PRN
Status: DISCONTINUED | OUTPATIENT
Start: 2024-07-01 | End: 2024-07-02 | Stop reason: HOSPADM

## 2024-07-01 RX ADMIN — HEPARIN 500 UNITS: 100 SYRINGE at 08:38

## 2024-07-01 RX ADMIN — SODIUM CHLORIDE, PRESERVATIVE FREE 10 ML: 5 INJECTION INTRAVENOUS at 08:38

## 2024-09-03 SDOH — HEALTH STABILITY: PHYSICAL HEALTH: ON AVERAGE, HOW MANY DAYS PER WEEK DO YOU ENGAGE IN MODERATE TO STRENUOUS EXERCISE (LIKE A BRISK WALK)?: 3 DAYS

## 2024-09-03 SDOH — HEALTH STABILITY: PHYSICAL HEALTH: ON AVERAGE, HOW MANY MINUTES DO YOU ENGAGE IN EXERCISE AT THIS LEVEL?: 40 MIN

## 2024-09-03 ASSESSMENT — PATIENT HEALTH QUESTIONNAIRE - PHQ9
SUM OF ALL RESPONSES TO PHQ QUESTIONS 1-9: 0
SUM OF ALL RESPONSES TO PHQ QUESTIONS 1-9: 0
1. LITTLE INTEREST OR PLEASURE IN DOING THINGS: NOT AT ALL
2. FEELING DOWN, DEPRESSED OR HOPELESS: NOT AT ALL
SUM OF ALL RESPONSES TO PHQ QUESTIONS 1-9: 0
SUM OF ALL RESPONSES TO PHQ QUESTIONS 1-9: 0
SUM OF ALL RESPONSES TO PHQ9 QUESTIONS 1 & 2: 0

## 2024-09-03 ASSESSMENT — LIFESTYLE VARIABLES
HOW OFTEN DO YOU HAVE A DRINK CONTAINING ALCOHOL: NEVER
HOW OFTEN DO YOU HAVE SIX OR MORE DRINKS ON ONE OCCASION: 1
HOW MANY STANDARD DRINKS CONTAINING ALCOHOL DO YOU HAVE ON A TYPICAL DAY: PATIENT DOES NOT DRINK
HOW MANY STANDARD DRINKS CONTAINING ALCOHOL DO YOU HAVE ON A TYPICAL DAY: 0
HOW OFTEN DO YOU HAVE A DRINK CONTAINING ALCOHOL: 1

## 2024-09-06 ENCOUNTER — OFFICE VISIT (OUTPATIENT)
Dept: PRIMARY CARE CLINIC | Age: 74
End: 2024-09-06
Payer: MEDICARE

## 2024-09-06 VITALS
BODY MASS INDEX: 27.72 KG/M2 | TEMPERATURE: 98 F | OXYGEN SATURATION: 97 % | WEIGHT: 176.6 LBS | HEART RATE: 84 BPM | HEIGHT: 67 IN | SYSTOLIC BLOOD PRESSURE: 140 MMHG | DIASTOLIC BLOOD PRESSURE: 80 MMHG

## 2024-09-06 DIAGNOSIS — C85.98 LYMPHOMA OF LYMPH NODES OF MULTIPLE REGIONS, UNSPECIFIED LYMPHOMA TYPE (HCC): ICD-10-CM

## 2024-09-06 DIAGNOSIS — Z13.1 SCREENING FOR DIABETES MELLITUS: ICD-10-CM

## 2024-09-06 DIAGNOSIS — Z12.5 SCREENING PSA (PROSTATE SPECIFIC ANTIGEN): ICD-10-CM

## 2024-09-06 DIAGNOSIS — Z00.00 MEDICARE ANNUAL WELLNESS VISIT, SUBSEQUENT: Primary | ICD-10-CM

## 2024-09-06 DIAGNOSIS — Z13.220 ENCOUNTER FOR SCREENING FOR LIPID DISORDER: ICD-10-CM

## 2024-09-06 LAB
ALBUMIN SERPL-MCNC: 4.7 G/DL (ref 3.5–5.2)
ALP SERPL-CCNC: 86 U/L (ref 40–129)
ALT SERPL-CCNC: 23 U/L (ref 5–41)
ANION GAP SERPL CALCULATED.3IONS-SCNC: 13 MMOL/L (ref 7–19)
AST SERPL-CCNC: 23 U/L (ref 5–40)
BASOPHILS # BLD: 0 K/UL (ref 0–0.2)
BASOPHILS NFR BLD: 0.6 % (ref 0–1)
BILIRUB SERPL-MCNC: 0.5 MG/DL (ref 0.2–1.2)
BUN SERPL-MCNC: 16 MG/DL (ref 8–23)
CALCIUM SERPL-MCNC: 9.5 MG/DL (ref 8.8–10.2)
CHLORIDE SERPL-SCNC: 101 MMOL/L (ref 98–111)
CHOLEST SERPL-MCNC: 198 MG/DL (ref 0–199)
CO2 SERPL-SCNC: 24 MMOL/L (ref 22–29)
CREAT SERPL-MCNC: 0.8 MG/DL (ref 0.7–1.2)
EOSINOPHIL # BLD: 0.1 K/UL (ref 0–0.6)
EOSINOPHIL NFR BLD: 1.6 % (ref 0–5)
ERYTHROCYTE [DISTWIDTH] IN BLOOD BY AUTOMATED COUNT: 13 % (ref 11.5–14.5)
GLUCOSE SERPL-MCNC: 104 MG/DL (ref 70–99)
HCT VFR BLD AUTO: 46.9 % (ref 42–52)
HDLC SERPL-MCNC: 46 MG/DL (ref 40–60)
HGB BLD-MCNC: 15.8 G/DL (ref 14–18)
IMM GRANULOCYTES # BLD: 0 K/UL
LDLC SERPL CALC-MCNC: 125 MG/DL
LYMPHOCYTES # BLD: 1.1 K/UL (ref 1.1–4.5)
LYMPHOCYTES NFR BLD: 22 % (ref 20–40)
MCH RBC QN AUTO: 32.4 PG (ref 27–31)
MCHC RBC AUTO-ENTMCNC: 33.7 G/DL (ref 33–37)
MCV RBC AUTO: 96.3 FL (ref 80–94)
MONOCYTES # BLD: 0.5 K/UL (ref 0–0.9)
MONOCYTES NFR BLD: 9.4 % (ref 0–10)
NEUTROPHILS # BLD: 3.4 K/UL (ref 1.5–7.5)
NEUTS SEG NFR BLD: 66.4 % (ref 50–65)
PLATELET # BLD AUTO: 167 K/UL (ref 130–400)
PMV BLD AUTO: 10.2 FL (ref 9.4–12.4)
POTASSIUM SERPL-SCNC: 4.6 MMOL/L (ref 3.5–5)
PROT SERPL-MCNC: 7.9 G/DL (ref 6.4–8.3)
PSA SERPL-MCNC: 1.07 NG/ML (ref 0–4)
RBC # BLD AUTO: 4.87 M/UL (ref 4.7–6.1)
SODIUM SERPL-SCNC: 138 MMOL/L (ref 136–145)
TRIGL SERPL-MCNC: 134 MG/DL (ref 0–149)
WBC # BLD AUTO: 5.1 K/UL (ref 4.8–10.8)

## 2024-09-06 PROCEDURE — 1123F ACP DISCUSS/DSCN MKR DOCD: CPT | Performed by: NURSE PRACTITIONER

## 2024-09-06 PROCEDURE — 3077F SYST BP >= 140 MM HG: CPT | Performed by: NURSE PRACTITIONER

## 2024-09-06 PROCEDURE — 3079F DIAST BP 80-89 MM HG: CPT | Performed by: NURSE PRACTITIONER

## 2024-09-06 PROCEDURE — G0439 PPPS, SUBSEQ VISIT: HCPCS | Performed by: NURSE PRACTITIONER

## 2024-09-06 PROCEDURE — 3017F COLORECTAL CA SCREEN DOC REV: CPT | Performed by: NURSE PRACTITIONER

## 2024-09-06 NOTE — PATIENT INSTRUCTIONS
idea to know your test results and keep a list of the medicines you take.  How can you care for yourself at home?  Diet    Use less salt when you cook and eat. This helps lower your blood pressure. Taste food before salting. Add only a little salt when you think you need it. With time, your taste buds will adjust to less salt.     Eat fewer snack items, fast foods, canned soups, and other high-salt, high-fat, processed foods.     Read food labels and try to avoid saturated and trans fats. They increase your risk of heart disease by raising cholesterol levels.     Limit the amount of solid fat--butter, margarine, and shortening--you eat. Use olive, peanut, or canola oil when you cook. Bake, broil, and steam foods instead of frying them.     Eat a variety of fruit and vegetables every day. Dark green, deep orange, red, or yellow fruits and vegetables are especially good for you. Examples include spinach, carrots, peaches, and berries.     Foods high in fiber can reduce your cholesterol and provide important vitamins and minerals. High-fiber foods include whole-grain cereals and breads, oatmeal, beans, brown rice, citrus fruits, and apples.     Eat lean proteins. Heart-healthy proteins include seafood, lean meats and poultry, eggs, beans, peas, nuts, seeds, and soy products.     Limit drinks and foods with added sugar. These include candy, desserts, and soda pop.   Heart-healthy lifestyle    If your doctor recommends it, get more exercise. For many people, walking is a good choice. Or you may want to swim, bike, or do other activities. Bit by bit, increase the time you're active every day. Try for at least 30 minutes on most days of the week.     Try to quit or cut back on using tobacco and other nicotine products. This includes smoking and vaping. If you need help quitting, talk to your doctor about stop-smoking programs and medicines. These can increase your chances of quitting for good. Quitting is one of the most

## 2024-09-06 NOTE — PROGRESS NOTES
Medicare Annual Wellness Visit    Sage Partida is here for Medicare AWV (Is fasting for labs. )    Assessment & Plan   Medicare annual wellness visit, subsequent  Lymphoma of lymph nodes of multiple regions, unspecified lymphoma type (HCC)  -     CBC with Auto Differential  Encounter for screening for lipid disorder  -     Lipid Panel  Screening for diabetes mellitus  -     Comprehensive Metabolic Panel  Screening PSA (prostate specific antigen)  -     PSA Screening  He will continue with Dr. Munoz and cardiology.  He did have a carotid bruit today and when I went to look at his chart he had a carotid Doppler ordered but he did not go for in June.  He is willing to go for it now.  I pulled the order and have the nurse call and schedule it for him.  I discussed how important it is for him to go because he could have a stroke from a blockage and this is something that could be taken care of if we need to have any intervention.  He is willing to do it.    Recommendations for Preventive Services Due: see orders and patient instructions/AVS.  Recommended screening schedule for the next 5-10 years is provided to the patient in written form: see Patient Instructions/AVS.     Return in about 1 year (around 9/6/2025).     Subjective   The following acute and/or chronic problems were also addressed today:    He is doing very well.  He is managed by cardiology for A-fib he is on diltiazem and the Xarelto.  He is still seeing Dr. Munoz for his lymphoma but he is approaching 2 years and they are considering removing his port because he is done with treatments.  His labs have been very good.  In general he is feeling very good.    Patient's complete Health Risk Assessment and screening values have been reviewed and are found in Flowsheets. The following problems were reviewed today and where indicated follow up appointments were made and/or referrals ordered.    Positive Risk Factor Screenings with Interventions:

## 2024-09-10 ENCOUNTER — HOSPITAL ENCOUNTER (OUTPATIENT)
Dept: VASCULAR LAB | Age: 74
Discharge: HOME | End: 2024-09-12
Attending: INTERNAL MEDICINE
Payer: MEDICARE

## 2024-09-10 DIAGNOSIS — R06.02 SHORTNESS OF BREATH: ICD-10-CM

## 2024-09-10 DIAGNOSIS — I48.0 PAROXYSMAL ATRIAL FIBRILLATION (HCC): ICD-10-CM

## 2024-09-10 DIAGNOSIS — R42 DIZZINESS: ICD-10-CM

## 2024-09-10 LAB
VAS LEFT ARM BP: 152 MMHG
VAS LEFT CCA MID EDV: 21.7 CM/S
VAS LEFT CCA MID PSV: 114 CM/S
VAS LEFT CCA PROX EDV: 20.3 CM/S
VAS LEFT CCA PROX PSV: 99.3 CM/S
VAS LEFT ECA EDV: 20.3 CM/S
VAS LEFT ECA PSV: 96 CM/S
VAS LEFT ICA DIST EDV: 29.2 CM/S
VAS LEFT ICA DIST PSV: 92.6 CM/S
VAS LEFT ICA MID EDV: 25 CM/S
VAS LEFT ICA MID PSV: 80.8 CM/S
VAS LEFT ICA PROX EDV: 22 CM/S
VAS LEFT ICA PROX PSV: 80.8 CM/S
VAS LEFT VERTEBRAL EDV: 10.6 CM/S
VAS LEFT VERTEBRAL PSV: 38.8 CM/S
VAS RIGHT ARM BP: 148 MMHG
VAS RIGHT CCA MID EDV: 15.5 CM/S
VAS RIGHT CCA MID PSV: 107 CM/S
VAS RIGHT CCA PROX EDV: 10.6 CM/S
VAS RIGHT CCA PROX PSV: 103 CM/S
VAS RIGHT ECA EDV: 13 CM/S
VAS RIGHT ECA PSV: 90.7 CM/S
VAS RIGHT ICA DIST EDV: 17.2 CM/S
VAS RIGHT ICA DIST PSV: 85.5 CM/S
VAS RIGHT ICA MID EDV: 19.6 CM/S
VAS RIGHT ICA MID PSV: 107 CM/S
VAS RIGHT ICA PROX EDV: 15.3 CM/S
VAS RIGHT ICA PROX PSV: 64.7 CM/S
VAS RIGHT VERTEBRAL EDV: 11.8 CM/S
VAS RIGHT VERTEBRAL PSV: 52.1 CM/S

## 2024-09-10 PROCEDURE — 93880 EXTRACRANIAL BILAT STUDY: CPT

## 2024-09-10 PROCEDURE — 93880 EXTRACRANIAL BILAT STUDY: CPT | Performed by: SURGERY

## 2024-09-11 ENCOUNTER — HOSPITAL ENCOUNTER (OUTPATIENT)
Dept: CT IMAGING | Age: 74
Discharge: HOME | End: 2024-09-11
Attending: INTERNAL MEDICINE
Payer: MEDICARE

## 2024-09-11 ENCOUNTER — HOSPITAL ENCOUNTER (OUTPATIENT)
Dept: INFUSION THERAPY | Age: 74
Discharge: HOME | End: 2024-09-11
Payer: MEDICARE

## 2024-09-11 DIAGNOSIS — Z45.2 ENCOUNTER FOR CENTRAL LINE CARE: Primary | ICD-10-CM

## 2024-09-11 DIAGNOSIS — C83.32 DIFFUSE LARGE B-CELL LYMPHOMA OF INTRATHORACIC LYMPH NODES (HCC): ICD-10-CM

## 2024-09-11 PROCEDURE — 6360000002 HC RX W HCPCS: Performed by: INTERNAL MEDICINE

## 2024-09-11 PROCEDURE — 96523 IRRIG DRUG DELIVERY DEVICE: CPT

## 2024-09-11 PROCEDURE — 70491 CT SOFT TISSUE NECK W/DYE: CPT

## 2024-09-11 PROCEDURE — 6360000004 HC RX CONTRAST MEDICATION: Performed by: INTERNAL MEDICINE

## 2024-09-11 PROCEDURE — 74177 CT ABD & PELVIS W/CONTRAST: CPT

## 2024-09-11 PROCEDURE — 2580000003 HC RX 258: Performed by: INTERNAL MEDICINE

## 2024-09-11 PROCEDURE — 71260 CT THORAX DX C+: CPT

## 2024-09-11 RX ORDER — SODIUM CHLORIDE 0.9 % (FLUSH) 0.9 %
5-40 SYRINGE (ML) INJECTION PRN
Status: DISCONTINUED | OUTPATIENT
Start: 2024-09-11 | End: 2024-09-12 | Stop reason: HOSPADM

## 2024-09-11 RX ORDER — HEPARIN 100 UNIT/ML
500 SYRINGE INTRAVENOUS PRN
Status: DISCONTINUED | OUTPATIENT
Start: 2024-09-11 | End: 2024-09-12 | Stop reason: HOSPADM

## 2024-09-11 RX ORDER — IOPAMIDOL 755 MG/ML
100 INJECTION, SOLUTION INTRAVASCULAR
Status: COMPLETED | OUTPATIENT
Start: 2024-09-11 | End: 2024-09-11

## 2024-09-11 RX ADMIN — SODIUM CHLORIDE, PRESERVATIVE FREE 10 ML: 5 INJECTION INTRAVENOUS at 10:36

## 2024-09-11 RX ADMIN — HEPARIN 500 UNITS: 100 SYRINGE at 10:36

## 2024-09-11 RX ADMIN — IOPAMIDOL 100 ML: 755 INJECTION, SOLUTION INTRAVENOUS at 10:36

## 2024-09-17 ENCOUNTER — TELEPHONE (OUTPATIENT)
Dept: HEMATOLOGY | Age: 74
End: 2024-09-17

## 2024-09-17 DIAGNOSIS — C83.32 DIFFUSE LARGE B-CELL LYMPHOMA OF INTRATHORACIC LYMPH NODES (HCC): Primary | ICD-10-CM

## 2024-09-18 ENCOUNTER — HOSPITAL ENCOUNTER (OUTPATIENT)
Dept: INFUSION THERAPY | Age: 74
Discharge: HOME | End: 2024-09-18
Payer: MEDICARE

## 2024-09-18 ENCOUNTER — OFFICE VISIT (OUTPATIENT)
Dept: HEMATOLOGY | Age: 74
End: 2024-09-18
Payer: MEDICARE

## 2024-09-18 VITALS
WEIGHT: 177.2 LBS | BODY MASS INDEX: 27.81 KG/M2 | TEMPERATURE: 97.7 F | OXYGEN SATURATION: 99 % | SYSTOLIC BLOOD PRESSURE: 144 MMHG | HEART RATE: 78 BPM | HEIGHT: 67 IN | DIASTOLIC BLOOD PRESSURE: 80 MMHG

## 2024-09-18 DIAGNOSIS — Z71.89 CARE PLAN DISCUSSED WITH PATIENT: ICD-10-CM

## 2024-09-18 DIAGNOSIS — C83.32 DIFFUSE LARGE B-CELL LYMPHOMA OF INTRATHORACIC LYMPH NODES (HCC): Primary | ICD-10-CM

## 2024-09-18 DIAGNOSIS — C83.32 DIFFUSE LARGE B-CELL LYMPHOMA OF INTRATHORACIC LYMPH NODES (HCC): ICD-10-CM

## 2024-09-18 DIAGNOSIS — Z85.79 ENCOUNTER FOR FOLLOW-UP SURVEILLANCE OF DIFFUSE LARGE B-CELL LYMPHOMA: ICD-10-CM

## 2024-09-18 DIAGNOSIS — Z08 ENCOUNTER FOR FOLLOW-UP SURVEILLANCE OF DIFFUSE LARGE B-CELL LYMPHOMA: ICD-10-CM

## 2024-09-18 DIAGNOSIS — R53.83 OTHER FATIGUE: ICD-10-CM

## 2024-09-18 DIAGNOSIS — R23.3 EASY BRUISING: ICD-10-CM

## 2024-09-18 DIAGNOSIS — M25.541 ARTHRALGIA OF RIGHT HAND: ICD-10-CM

## 2024-09-18 DIAGNOSIS — J84.9 INTERSTITIAL LUNG DISEASE (HCC): ICD-10-CM

## 2024-09-18 LAB
ALBUMIN SERPL-MCNC: 4.5 G/DL (ref 3.5–5.2)
ALP SERPL-CCNC: 75 U/L (ref 40–129)
ALT SERPL-CCNC: 26 U/L (ref 5–41)
ANION GAP SERPL CALCULATED.3IONS-SCNC: 10 MMOL/L (ref 7–19)
AST SERPL-CCNC: 27 U/L (ref 5–40)
BASOPHILS # BLD: 0.02 K/UL (ref 0.01–0.08)
BASOPHILS NFR BLD: 0.4 % (ref 0.1–1.2)
BILIRUB SERPL-MCNC: 0.4 MG/DL (ref 0–1.2)
BUN SERPL-MCNC: 17 MG/DL (ref 8–23)
CALCIUM SERPL-MCNC: 9.3 MG/DL (ref 8.8–10.2)
CHLORIDE SERPL-SCNC: 102 MMOL/L (ref 98–107)
CO2 SERPL-SCNC: 26 MMOL/L (ref 22–29)
CREAT SERPL-MCNC: 0.9 MG/DL (ref 0.7–1.2)
EOSINOPHIL # BLD: 0.06 K/UL (ref 0.04–0.54)
EOSINOPHIL NFR BLD: 1.1 % (ref 0.7–7)
ERYTHROCYTE [DISTWIDTH] IN BLOOD BY AUTOMATED COUNT: 13 % (ref 11.6–14.4)
GLUCOSE SERPL-MCNC: 96 MG/DL (ref 70–99)
HCT VFR BLD AUTO: 44.2 % (ref 40.1–51)
HGB BLD-MCNC: 15.3 G/DL (ref 13.7–17.5)
LDH SERPL-CCNC: 154 U/L (ref 135–225)
LYMPHOCYTES # BLD: 1.24 K/UL (ref 1.18–3.74)
LYMPHOCYTES NFR BLD: 22.9 % (ref 19.3–53.1)
MCH RBC QN AUTO: 32.6 PG (ref 25.7–32.2)
MCHC RBC AUTO-ENTMCNC: 34.6 G/DL (ref 32.3–36.5)
MCV RBC AUTO: 94.2 FL (ref 79–92.2)
MONOCYTES # BLD: 0.42 K/UL (ref 0.24–0.82)
MONOCYTES NFR BLD: 7.8 % (ref 4.7–12.5)
NEUTROPHILS # BLD: 3.66 K/UL (ref 1.56–6.13)
NEUTS SEG NFR BLD: 67.6 % (ref 34–71.1)
PLATELET # BLD AUTO: 159 K/UL (ref 163–337)
PMV BLD AUTO: 9.8 FL (ref 7.4–10.4)
POTASSIUM SERPL-SCNC: 4.1 MMOL/L (ref 3.5–5.1)
PROT SERPL-MCNC: 7.5 G/DL (ref 6.4–8.3)
RBC # BLD AUTO: 4.69 M/UL (ref 4.63–6.08)
SODIUM SERPL-SCNC: 138 MMOL/L (ref 136–145)
URATE SERPL-MCNC: 5.1 MG/DL (ref 3.4–7)
WBC # BLD AUTO: 5.41 K/UL (ref 4.23–9.07)

## 2024-09-18 PROCEDURE — 36415 COLL VENOUS BLD VENIPUNCTURE: CPT

## 2024-09-18 PROCEDURE — 83615 LACTATE (LD) (LDH) ENZYME: CPT

## 2024-09-18 PROCEDURE — 3017F COLORECTAL CA SCREEN DOC REV: CPT | Performed by: INTERNAL MEDICINE

## 2024-09-18 PROCEDURE — 80053 COMPREHEN METABOLIC PANEL: CPT

## 2024-09-18 PROCEDURE — 84550 ASSAY OF BLOOD/URIC ACID: CPT

## 2024-09-18 PROCEDURE — G8427 DOCREV CUR MEDS BY ELIG CLIN: HCPCS | Performed by: INTERNAL MEDICINE

## 2024-09-18 PROCEDURE — 3079F DIAST BP 80-89 MM HG: CPT | Performed by: INTERNAL MEDICINE

## 2024-09-18 PROCEDURE — G8417 CALC BMI ABV UP PARAM F/U: HCPCS | Performed by: INTERNAL MEDICINE

## 2024-09-18 PROCEDURE — G2211 COMPLEX E/M VISIT ADD ON: HCPCS | Performed by: INTERNAL MEDICINE

## 2024-09-18 PROCEDURE — 99213 OFFICE O/P EST LOW 20 MIN: CPT

## 2024-09-18 PROCEDURE — 1123F ACP DISCUSS/DSCN MKR DOCD: CPT | Performed by: INTERNAL MEDICINE

## 2024-09-18 PROCEDURE — 99213 OFFICE O/P EST LOW 20 MIN: CPT | Performed by: INTERNAL MEDICINE

## 2024-09-18 PROCEDURE — 1036F TOBACCO NON-USER: CPT | Performed by: INTERNAL MEDICINE

## 2024-09-18 PROCEDURE — 3077F SYST BP >= 140 MM HG: CPT | Performed by: INTERNAL MEDICINE

## 2024-09-18 PROCEDURE — 85025 COMPLETE CBC W/AUTO DIFF WBC: CPT

## 2024-10-16 ENCOUNTER — OFFICE VISIT (OUTPATIENT)
Dept: SURGERY | Age: 74
End: 2024-10-16
Payer: MEDICARE

## 2024-10-16 ENCOUNTER — PREP FOR PROCEDURE (OUTPATIENT)
Dept: SURGERY | Age: 74
End: 2024-10-16

## 2024-10-16 VITALS
HEART RATE: 71 BPM | HEIGHT: 67 IN | WEIGHT: 169 LBS | TEMPERATURE: 97.8 F | BODY MASS INDEX: 26.53 KG/M2 | OXYGEN SATURATION: 66 %

## 2024-10-16 DIAGNOSIS — C85.10 LARGE B-CELL LYMPHOMA (HCC): Primary | ICD-10-CM

## 2024-10-16 PROCEDURE — G8484 FLU IMMUNIZE NO ADMIN: HCPCS | Performed by: SURGERY

## 2024-10-16 PROCEDURE — G8417 CALC BMI ABV UP PARAM F/U: HCPCS | Performed by: SURGERY

## 2024-10-16 PROCEDURE — 1123F ACP DISCUSS/DSCN MKR DOCD: CPT | Performed by: SURGERY

## 2024-10-16 PROCEDURE — 1036F TOBACCO NON-USER: CPT | Performed by: SURGERY

## 2024-10-16 PROCEDURE — G8427 DOCREV CUR MEDS BY ELIG CLIN: HCPCS | Performed by: SURGERY

## 2024-10-16 PROCEDURE — 3017F COLORECTAL CA SCREEN DOC REV: CPT | Performed by: SURGERY

## 2024-10-16 PROCEDURE — 99213 OFFICE O/P EST LOW 20 MIN: CPT | Performed by: SURGERY

## 2024-10-16 RX ORDER — ACETAMINOPHEN 325 MG/1
1000 TABLET ORAL ONCE
Status: CANCELLED | OUTPATIENT
Start: 2024-10-16 | End: 2024-10-16

## 2024-10-16 RX ORDER — SODIUM CHLORIDE 0.9 % (FLUSH) 0.9 %
5-40 SYRINGE (ML) INJECTION PRN
Status: CANCELLED | OUTPATIENT
Start: 2024-10-16

## 2024-10-16 RX ORDER — CELECOXIB 100 MG/1
100 CAPSULE ORAL ONCE
Status: CANCELLED | OUTPATIENT
Start: 2024-10-16 | End: 2024-10-16

## 2024-10-16 RX ORDER — SODIUM CHLORIDE, SODIUM LACTATE, POTASSIUM CHLORIDE, CALCIUM CHLORIDE 600; 310; 30; 20 MG/100ML; MG/100ML; MG/100ML; MG/100ML
INJECTION, SOLUTION INTRAVENOUS CONTINUOUS
Status: CANCELLED | OUTPATIENT
Start: 2024-10-16

## 2024-10-16 RX ORDER — SODIUM CHLORIDE 0.9 % (FLUSH) 0.9 %
5-40 SYRINGE (ML) INJECTION EVERY 12 HOURS SCHEDULED
Status: CANCELLED | OUTPATIENT
Start: 2024-10-16

## 2024-10-16 RX ORDER — SODIUM CHLORIDE 9 MG/ML
INJECTION, SOLUTION INTRAVENOUS PRN
Status: CANCELLED | OUTPATIENT
Start: 2024-10-16

## 2024-10-16 NOTE — PROGRESS NOTES
SUBJECTIVE:  Mr. Partida is a 74 y.o. male who presents today with history of a port placement in April of 2022 and has been treated for lymphoma. He is doing well and notes no further need for the port and he would like it removed.      Past Medical History:   Diagnosis Date    A-fib (HCC)     Cancer (HCC)     lymphoma    Chronic neck pain     H/O tinnitus     Herniated disc, cervical     HTN (hypertension)      Past Surgical History:   Procedure Laterality Date    DENTAL SURGERY      LYMPH NODE BIOPSY      left side of neck    PORT SURGERY N/A 04/08/2022    SINGLE LUMEN PORT PLACEMENT WITH US & FLUORO performed by Rossi Chavez MD at [facility] OR     Current Outpatient Medications   Medication Sig Dispense Refill    rivaroxaban (XARELTO) 20 MG TABS tablet TAKE 1 TABLET EVERY DAY WITH BREAKFAST 90 tablet 3    dilTIAZem (CARDIZEM CD) 240 MG extended release capsule TAKE 1 CAPSULE EVERY DAY 90 capsule 3    lidocaine-prilocaine (EMLA) 2.5-2.5 % cream APPLY TOPICALLY TO PORT AREA AND COVER WITH PLASTIC WRAP ONE HOUR PRIOR TO TREATMENT 30 g 0    nystatin (MYCOSTATIN) 214041 UNIT/GM ointment       KRILL OIL PO Take by mouth      Red Yeast Rice Extract (RED YEAST RICE PO) Take by mouth      Probiotic Product (PROBIOTIC-10 PO) Take by mouth      Zn-Pyg Afri-Nettle-Saw Palmet (SAW PALMETTO COMPLEX PO) Take by mouth      Multiple Vitamins-Minerals (THERAPEUTIC MULTIVITAMIN-MINERALS) tablet Take 1 tablet by mouth daily Red Eye      niacin 500 MG CR capsule Take 1 capsule by mouth nightly       No current facility-administered medications for this visit.     Allergies: Patient has no known allergies.    Family History   Problem Relation Age of Onset    Cancer Mother         lymphoma    Heart Disease Father        Social History     Tobacco Use    Smoking status: Former     Current packs/day: 0.00     Average packs/day: 2.0 packs/day for 15.0 years (30.0 ttl pk-yrs)     Types: Cigarettes     Start date: 1968     Quit date: 1983

## 2024-10-16 NOTE — H&P (VIEW-ONLY)
SUBJECTIVE:  Mr. Partida is a 74 y.o. male who presents today with history of a port placement in April of 2022 and has been treated for lymphoma. He is doing well and notes no further need for the port and he would like it removed.      Past Medical History:   Diagnosis Date    A-fib (HCC)     Cancer (HCC)     lymphoma    Chronic neck pain     H/O tinnitus     Herniated disc, cervical     HTN (hypertension)      Past Surgical History:   Procedure Laterality Date    DENTAL SURGERY      LYMPH NODE BIOPSY      left side of neck    PORT SURGERY N/A 04/08/2022    SINGLE LUMEN PORT PLACEMENT WITH US & FLUORO performed by Rossi Chavez MD at [facility] OR     Current Outpatient Medications   Medication Sig Dispense Refill    rivaroxaban (XARELTO) 20 MG TABS tablet TAKE 1 TABLET EVERY DAY WITH BREAKFAST 90 tablet 3    dilTIAZem (CARDIZEM CD) 240 MG extended release capsule TAKE 1 CAPSULE EVERY DAY 90 capsule 3    lidocaine-prilocaine (EMLA) 2.5-2.5 % cream APPLY TOPICALLY TO PORT AREA AND COVER WITH PLASTIC WRAP ONE HOUR PRIOR TO TREATMENT 30 g 0    nystatin (MYCOSTATIN) 040903 UNIT/GM ointment       KRILL OIL PO Take by mouth      Red Yeast Rice Extract (RED YEAST RICE PO) Take by mouth      Probiotic Product (PROBIOTIC-10 PO) Take by mouth      Zn-Pyg Afri-Nettle-Saw Palmet (SAW PALMETTO COMPLEX PO) Take by mouth      Multiple Vitamins-Minerals (THERAPEUTIC MULTIVITAMIN-MINERALS) tablet Take 1 tablet by mouth daily Red Eye      niacin 500 MG CR capsule Take 1 capsule by mouth nightly       No current facility-administered medications for this visit.     Allergies: Patient has no known allergies.    Family History   Problem Relation Age of Onset    Cancer Mother         lymphoma    Heart Disease Father        Social History     Tobacco Use    Smoking status: Former     Current packs/day: 0.00     Average packs/day: 2.0 packs/day for 15.0 years (30.0 ttl pk-yrs)     Types: Cigarettes     Start date: 1968     Quit date: 1983

## 2024-10-29 ENCOUNTER — ANESTHESIA (OUTPATIENT)
Dept: OPERATING ROOM | Age: 74
End: 2024-10-29

## 2024-10-29 ENCOUNTER — ANESTHESIA EVENT (OUTPATIENT)
Dept: OPERATING ROOM | Age: 74
End: 2024-10-29

## 2024-10-29 ENCOUNTER — HOSPITAL ENCOUNTER (OUTPATIENT)
Age: 74
Setting detail: OUTPATIENT SURGERY
Discharge: HOME | End: 2024-10-29
Attending: SURGERY | Admitting: SURGERY
Payer: MEDICARE

## 2024-10-29 VITALS
HEIGHT: 67 IN | OXYGEN SATURATION: 97 % | DIASTOLIC BLOOD PRESSURE: 62 MMHG | WEIGHT: 170 LBS | SYSTOLIC BLOOD PRESSURE: 115 MMHG | BODY MASS INDEX: 26.68 KG/M2 | RESPIRATION RATE: 18 BRPM | HEART RATE: 64 BPM | TEMPERATURE: 97.6 F

## 2024-10-29 PROCEDURE — G8907 PT DOC NO EVENTS ON DISCHARG: HCPCS

## 2024-10-29 PROCEDURE — 36590 REMOVAL TUNNELED CV CATH: CPT | Performed by: SURGERY

## 2024-10-29 PROCEDURE — 36590 REMOVAL TUNNELED CV CATH: CPT

## 2024-10-29 PROCEDURE — G8918 PT W/O PREOP ORDER IV AB PRO: HCPCS

## 2024-10-29 RX ORDER — SODIUM CHLORIDE 0.9 % (FLUSH) 0.9 %
5-40 SYRINGE (ML) INJECTION PRN
Status: DISCONTINUED | OUTPATIENT
Start: 2024-10-29 | End: 2024-10-29 | Stop reason: HOSPADM

## 2024-10-29 RX ORDER — PROPOFOL 10 MG/ML
INJECTION, EMULSION INTRAVENOUS
Status: DISCONTINUED | OUTPATIENT
Start: 2024-10-29 | End: 2024-10-29 | Stop reason: SDUPTHER

## 2024-10-29 RX ORDER — CELECOXIB 100 MG/1
100 CAPSULE ORAL ONCE
Status: COMPLETED | OUTPATIENT
Start: 2024-10-29 | End: 2024-10-29

## 2024-10-29 RX ORDER — SODIUM CHLORIDE 9 MG/ML
INJECTION, SOLUTION INTRAVENOUS PRN
Status: DISCONTINUED | OUTPATIENT
Start: 2024-10-29 | End: 2024-10-29 | Stop reason: HOSPADM

## 2024-10-29 RX ORDER — DEXAMETHASONE SODIUM PHOSPHATE 4 MG/ML
INJECTION, SOLUTION INTRA-ARTICULAR; INTRALESIONAL; INTRAMUSCULAR; INTRAVENOUS; SOFT TISSUE
Status: DISCONTINUED | OUTPATIENT
Start: 2024-10-29 | End: 2024-10-29 | Stop reason: SDUPTHER

## 2024-10-29 RX ORDER — SODIUM CHLORIDE, SODIUM LACTATE, POTASSIUM CHLORIDE, CALCIUM CHLORIDE 600; 310; 30; 20 MG/100ML; MG/100ML; MG/100ML; MG/100ML
INJECTION, SOLUTION INTRAVENOUS CONTINUOUS
Status: DISCONTINUED | OUTPATIENT
Start: 2024-10-29 | End: 2024-10-29 | Stop reason: HOSPADM

## 2024-10-29 RX ORDER — SODIUM CHLORIDE 0.9 % (FLUSH) 0.9 %
5-40 SYRINGE (ML) INJECTION EVERY 12 HOURS SCHEDULED
Status: DISCONTINUED | OUTPATIENT
Start: 2024-10-29 | End: 2024-10-29 | Stop reason: HOSPADM

## 2024-10-29 RX ORDER — DEXMEDETOMIDINE HYDROCHLORIDE 100 UG/ML
INJECTION, SOLUTION INTRAVENOUS
Status: DISCONTINUED | OUTPATIENT
Start: 2024-10-29 | End: 2024-10-29 | Stop reason: SDUPTHER

## 2024-10-29 RX ORDER — ACETAMINOPHEN 325 MG/1
1000 TABLET ORAL ONCE
Status: COMPLETED | OUTPATIENT
Start: 2024-10-29 | End: 2024-10-29

## 2024-10-29 RX ORDER — SODIUM CHLORIDE, SODIUM LACTATE, POTASSIUM CHLORIDE, CALCIUM CHLORIDE 600; 310; 30; 20 MG/100ML; MG/100ML; MG/100ML; MG/100ML
INJECTION, SOLUTION INTRAVENOUS
Status: DISCONTINUED | OUTPATIENT
Start: 2024-10-29 | End: 2024-10-29 | Stop reason: SDUPTHER

## 2024-10-29 RX ORDER — DEXAMETHASONE SODIUM PHOSPHATE 4 MG/ML
8 INJECTION, SOLUTION INTRA-ARTICULAR; INTRALESIONAL; INTRAMUSCULAR; INTRAVENOUS; SOFT TISSUE ONCE
Status: DISCONTINUED | OUTPATIENT
Start: 2024-10-29 | End: 2024-10-29 | Stop reason: HOSPADM

## 2024-10-29 RX ORDER — FENTANYL CITRATE 50 UG/ML
INJECTION, SOLUTION INTRAMUSCULAR; INTRAVENOUS
Status: DISCONTINUED | OUTPATIENT
Start: 2024-10-29 | End: 2024-10-29 | Stop reason: SDUPTHER

## 2024-10-29 RX ORDER — ONDANSETRON 2 MG/ML
INJECTION INTRAMUSCULAR; INTRAVENOUS
Status: DISCONTINUED | OUTPATIENT
Start: 2024-10-29 | End: 2024-10-29 | Stop reason: SDUPTHER

## 2024-10-29 RX ORDER — LIDOCAINE HYDROCHLORIDE 10 MG/ML
INJECTION, SOLUTION EPIDURAL; INFILTRATION; INTRACAUDAL; PERINEURAL
Status: DISCONTINUED | OUTPATIENT
Start: 2024-10-29 | End: 2024-10-29 | Stop reason: SDUPTHER

## 2024-10-29 RX ADMIN — ONDANSETRON 4 MG: 2 INJECTION INTRAMUSCULAR; INTRAVENOUS at 09:04

## 2024-10-29 RX ADMIN — SODIUM CHLORIDE, SODIUM LACTATE, POTASSIUM CHLORIDE, CALCIUM CHLORIDE: 600; 310; 30; 20 INJECTION, SOLUTION INTRAVENOUS at 08:20

## 2024-10-29 RX ADMIN — DEXMEDETOMIDINE HYDROCHLORIDE 4 MCG: 100 INJECTION, SOLUTION INTRAVENOUS at 09:00

## 2024-10-29 RX ADMIN — PROPOFOL 60 MG: 10 INJECTION, EMULSION INTRAVENOUS at 08:57

## 2024-10-29 RX ADMIN — CELECOXIB 100 MG: 100 CAPSULE ORAL at 08:20

## 2024-10-29 RX ADMIN — LIDOCAINE HYDROCHLORIDE 50 MG: 10 INJECTION, SOLUTION EPIDURAL; INFILTRATION; INTRACAUDAL; PERINEURAL at 08:57

## 2024-10-29 RX ADMIN — DEXAMETHASONE SODIUM PHOSPHATE 8 MG: 4 INJECTION, SOLUTION INTRA-ARTICULAR; INTRALESIONAL; INTRAMUSCULAR; INTRAVENOUS; SOFT TISSUE at 09:01

## 2024-10-29 RX ADMIN — ACETAMINOPHEN 975 MG: 325 TABLET ORAL at 08:20

## 2024-10-29 RX ADMIN — SODIUM CHLORIDE, SODIUM LACTATE, POTASSIUM CHLORIDE, CALCIUM CHLORIDE: 600; 310; 30; 20 INJECTION, SOLUTION INTRAVENOUS at 08:53

## 2024-10-29 RX ADMIN — PROPOFOL 120 MCG/KG/MIN: 10 INJECTION, EMULSION INTRAVENOUS at 08:58

## 2024-10-29 RX ADMIN — FENTANYL CITRATE 25 MCG: 50 INJECTION, SOLUTION INTRAMUSCULAR; INTRAVENOUS at 09:02

## 2024-10-29 ASSESSMENT — PAIN - FUNCTIONAL ASSESSMENT: PAIN_FUNCTIONAL_ASSESSMENT: NONE - DENIES PAIN

## 2024-10-29 ASSESSMENT — LIFESTYLE VARIABLES: SMOKING_STATUS: 0

## 2024-10-29 NOTE — INTERVAL H&P NOTE
Update History & Physical    The patient's History and Physical of October 16, 2024 was reviewed with the patient and I examined the patient. There was no change. The surgical site was confirmed by the patient and me.     Plan: The risks, benefits, expected outcome, and alternative to the recommended procedure have been discussed with the patient. Patient understands and wants to proceed with the procedure.     Electronically signed by Aylin Canales DO on 10/29/2024 at 8:39 AM

## 2024-10-29 NOTE — ANESTHESIA POSTPROCEDURE EVALUATION
Department of Anesthesiology  Postprocedure Note    Patient: Sage Partida  MRN: [id number]  YOB: 1950  Date of evaluation: 10/29/2024    Procedure Summary       Date: 10/29/24 Room / Location: [address] [facility]    Anesthesia Start: 0854 Anesthesia Stop: 0922    Procedure: RIGHT SINGLE LUMEN PORT REMOVAL (Right: Chest) Diagnosis:       Large B-cell lymphoma (HCC)      (Large B-cell lymphoma (HCC) [C85.10])    Surgeons: Aylin Canales DO Responsible Provider: Corry Rosado APRN - CRNA    Anesthesia Type: general, TIVA ASA Status: 2            Anesthesia Type: No value filed.    Arianne Phase I:      Arianne Phase II: Arianne Score: 9    Anesthesia Post Evaluation    Patient location during evaluation: bedside  Patient participation: complete - patient participated  Level of consciousness: awake and awake and alert  Airway patency: patent  Nausea & Vomiting: no nausea  Cardiovascular status: hemodynamically stable  Respiratory status: acceptable  Hydration status: stable  Comments: BP (!) 97/46   Pulse 59   Temp 97.2 °F (36.2 °C)   Resp 12   Ht 1.702 m (5' 7\")   Wt 77.1 kg (170 lb)   SpO2 96%   BMI 26.63 kg/m²     Pain management: adequate    No notable events documented.

## 2024-10-29 NOTE — DISCHARGE INSTR - ACTIVITY
No heavy lifting for one week  May shower tomorrow.   Do not soak in tub.         If you have signs of infection, call you doctor. These include:   -Increased pain, swelling, warmth, or redness  -Red streaks leading from the incision  -Pus draining from the incision  -A fever > 100.4

## 2024-10-29 NOTE — ANESTHESIA PRE PROCEDURE
Department of Anesthesiology  Preprocedure Note       Name:  Sage Partida   Age:  74 y.o.  :  1950                                          MRN:  [id number]         Date:  10/29/2024      Surgeon: Surgeon(s):  Aylin Canales DO    Procedure: Procedure(s):  RIGHT SINGLE LUMEN PORT REMOVAL    Medications prior to admission:   Prior to Admission medications    Medication Sig Start Date End Date Taking? Authorizing Provider   dilTIAZem (CARDIZEM CD) 240 MG extended release capsule TAKE 1 CAPSULE EVERY DAY 24  Yes Aby Villasenor APRN   lidocaine-prilocaine (EMLA) 2.5-2.5 % cream APPLY TOPICALLY TO PORT AREA AND COVER WITH PLASTIC WRAP ONE HOUR PRIOR TO TREATMENT 24  Yes John Munoz MD   KRILL OIL PO Take by mouth   Yes Alexandru Perkins MD   Red Yeast Rice Extract (RED YEAST RICE PO) Take by mouth   Yes Alexandru Perkins MD   Probiotic Product (PROBIOTIC-10 PO) Take by mouth   Yes ProviderAlexandru MD   Zn-Pyg Afri-Nettle-Saw Palmet (SAW PALMETTO COMPLEX PO) Take by mouth   Yes ProviderAlexandru MD   Multiple Vitamins-Minerals (THERAPEUTIC MULTIVITAMIN-MINERALS) tablet Take 1 tablet by mouth daily Red Eye   Yes ProviderAlexandru MD   niacin 500 MG CR capsule Take 1 capsule by mouth nightly   Yes Alexandru Perkins MD   rivaroxaban (XARELTO) 20 MG TABS tablet TAKE 1 TABLET EVERY DAY WITH BREAKFAST 24   Melnay Millan APRN - NP   nystatin (MYCOSTATIN) 362821 UNIT/GM ointment  22   ProviderAlexandru MD       Current medications:    Current Facility-Administered Medications   Medication Dose Route Frequency Provider Last Rate Last Admin   • dexAMETHasone (DECADRON) injection 8 mg  8 mg IntraVENous Once Aylin Canales DO       • lactated ringers IV soln infusion SOLN   IntraVENous Continuous Aylin Canales  mL/hr at 10/29/24 0820 New Bag at 10/29/24 0820   • sodium chloride flush 0.9 % injection 5-40 mL  5-40 mL IntraVENous 2 times per day [place]

## 2024-10-29 NOTE — OP NOTE
Operative Note      Patient: Sage Partida  YOB: 1950  MRN: [id number]    Date of Procedure: 10/29/2024    Pre-Op Diagnosis Codes:      * Large B-cell lymphoma (HCC) [C85.10]    Post-Op Diagnosis: Same       Procedure(s):  RIGHT SINGLE LUMEN PORT REMOVAL    Surgeon(s):  Aylin Canales DO    Assistant:   * No surgical staff found *    Anesthesia: Monitor Anesthesia Care    Estimated Blood Loss (mL): Minimal    Complications: None    Specimens:   * No specimens in log *    Implants:  * No implants in log *      Drains: * No LDAs found *    Findings:  Infection Present At Time Of Surgery (PATOS) (choose all levels that have infection present):  No infection present  Other Findings: port removal.       INDICATIONS    Mr. Sage Partida presented to the office with complaints of a port in place he no longer needs. he has elected for removal.          PROCEDURE  Patient was taken to the main operating room, placed on the operating table  supine.   After IV Antibiotics were administered, the patient was placed under sedative anesthesia.  The right chest was prepped and draped in the usual sterile fashion.  A timeout was performed identifying the correct patient, position, and equipment. Local anesthetic of 0.25% Marcaine was anesthetized into the skin overlying the previous scar. An 15 blade was used to make incision. A combination of blunt and electrocautery dissection was performed to excise the port along with its capsule. The port was sent to pathology. The area was irrigated and dried. Hemostasis was obtained.    The incision was closed with 2 layers 1st with 3-0 Vicryl followed by 4-0 Monocryl for the skin. The area was washed and dried and sterile Dermabond was applied.    Sponge, needle, instrument count correct on 2 occasions.  Estimated  intraoperative blood loss minimal.     Mr. Partida tolerated his surgery well and he was taken to PACU in  satisfactory

## 2024-11-14 ENCOUNTER — PATIENT MESSAGE (OUTPATIENT)
Dept: PRIMARY CARE CLINIC | Age: 74
End: 2024-11-14

## 2024-11-14 RX ORDER — CELECOXIB 100 MG/1
100 CAPSULE ORAL 2 TIMES DAILY
Qty: 60 CAPSULE | Refills: 4 | Status: SHIPPED | OUTPATIENT
Start: 2024-11-14

## 2024-12-09 RX ORDER — DILTIAZEM HYDROCHLORIDE 240 MG/1
240 CAPSULE, COATED, EXTENDED RELEASE ORAL DAILY
Qty: 90 CAPSULE | Refills: 3 | Status: SHIPPED | OUTPATIENT
Start: 2024-12-09

## 2025-03-13 ENCOUNTER — TELEPHONE (OUTPATIENT)
Dept: HEMATOLOGY | Age: 75
End: 2025-03-13

## 2025-03-13 NOTE — TELEPHONE ENCOUNTER
I called patient and left detailed voicemail about their appointment on 03/18/2025. I made patient aware not to arrive any earlier than the appointment time and to come at the time of the follow up not the time of the lab appointment if it is different than the follow up appt time. I also made patient aware to eat and drink plenty of water to hydrate properly before coming to these appointments because this will make their lab draw much easier. Made patient aware that we are now located at the [facility] at [address] [place] [address]. Located between [facility] and the [facility]

## 2025-03-17 DIAGNOSIS — C83.32 DIFFUSE LARGE B-CELL LYMPHOMA OF INTRATHORACIC LYMPH NODES (HCC): Primary | ICD-10-CM

## 2025-03-17 DIAGNOSIS — Z85.79 ENCOUNTER FOR FOLLOW-UP SURVEILLANCE OF DIFFUSE LARGE B-CELL LYMPHOMA: ICD-10-CM

## 2025-03-17 DIAGNOSIS — Z08 ENCOUNTER FOR FOLLOW-UP SURVEILLANCE OF DIFFUSE LARGE B-CELL LYMPHOMA: ICD-10-CM

## 2025-03-18 ENCOUNTER — OFFICE VISIT (OUTPATIENT)
Dept: HEMATOLOGY | Age: 75
End: 2025-03-18
Payer: MEDICARE

## 2025-03-18 ENCOUNTER — HOSPITAL ENCOUNTER (OUTPATIENT)
Dept: INFUSION THERAPY | Age: 75
Discharge: HOME | End: 2025-03-18
Payer: MEDICARE

## 2025-03-18 VITALS
BODY MASS INDEX: 28.02 KG/M2 | HEART RATE: 80 BPM | RESPIRATION RATE: 20 BRPM | TEMPERATURE: 97.6 F | WEIGHT: 178.5 LBS | HEIGHT: 67 IN | SYSTOLIC BLOOD PRESSURE: 138 MMHG | OXYGEN SATURATION: 98 % | DIASTOLIC BLOOD PRESSURE: 78 MMHG

## 2025-03-18 DIAGNOSIS — C83.32 DIFFUSE LARGE B-CELL LYMPHOMA OF INTRATHORACIC LYMPH NODES (HCC): ICD-10-CM

## 2025-03-18 DIAGNOSIS — Z71.89 CARE PLAN DISCUSSED WITH PATIENT: ICD-10-CM

## 2025-03-18 DIAGNOSIS — Z85.79 ENCOUNTER FOR FOLLOW-UP SURVEILLANCE OF DIFFUSE LARGE B-CELL LYMPHOMA: ICD-10-CM

## 2025-03-18 DIAGNOSIS — Z08 ENCOUNTER FOR FOLLOW-UP SURVEILLANCE OF DIFFUSE LARGE B-CELL LYMPHOMA: ICD-10-CM

## 2025-03-18 DIAGNOSIS — C83.32 DIFFUSE LARGE B-CELL LYMPHOMA OF INTRATHORACIC LYMPH NODES (HCC): Primary | ICD-10-CM

## 2025-03-18 LAB
ALBUMIN SERPL-MCNC: 4.3 G/DL (ref 3.5–5.2)
ALP SERPL-CCNC: 71 U/L (ref 40–129)
ALT SERPL-CCNC: 24 U/L (ref 5–41)
ANION GAP SERPL CALCULATED.3IONS-SCNC: 12 MMOL/L (ref 7–19)
AST SERPL-CCNC: 25 U/L (ref 5–40)
BASOPHILS # BLD: 0.04 K/UL (ref 0–0.2)
BASOPHILS NFR BLD: 0.6 % (ref 0–1)
BILIRUB SERPL-MCNC: 0.3 MG/DL (ref 0–1.2)
BUN SERPL-MCNC: 16 MG/DL (ref 8–23)
CALCIUM SERPL-MCNC: 9.1 MG/DL (ref 8.8–10.2)
CHLORIDE SERPL-SCNC: 100 MMOL/L (ref 98–107)
CO2 SERPL-SCNC: 27 MMOL/L (ref 22–29)
CREAT SERPL-MCNC: 1 MG/DL (ref 0.7–1.2)
EOSINOPHIL # BLD: 0.09 K/UL (ref 0–0.6)
EOSINOPHIL NFR BLD: 1.4 % (ref 0–5)
ERYTHROCYTE [DISTWIDTH] IN BLOOD BY AUTOMATED COUNT: 12.8 % (ref 11.5–14.5)
GLUCOSE SERPL-MCNC: 110 MG/DL (ref 70–99)
HCT VFR BLD AUTO: 43.3 % (ref 42–52)
HGB BLD-MCNC: 15.3 G/DL (ref 14–18)
LDH SERPL-CCNC: 150 U/L (ref 135–225)
LYMPHOCYTES # BLD: 1.39 K/UL (ref 1.1–4.5)
LYMPHOCYTES NFR BLD: 21.4 % (ref 20–40)
MCH RBC QN AUTO: 32.1 PG (ref 27–31)
MCHC RBC AUTO-ENTMCNC: 35.3 G/DL (ref 33–37)
MCV RBC AUTO: 91 FL (ref 80–94)
MONOCYTES # BLD: 0.47 K/UL (ref 0–0.9)
MONOCYTES NFR BLD: 7.2 % (ref 1–10)
NEUTROPHILS # BLD: 4.48 K/UL (ref 1.5–7.5)
NEUTS SEG NFR BLD: 69.1 % (ref 50–65)
PLATELET # BLD AUTO: 153 K/UL (ref 130–400)
PMV BLD AUTO: 9.5 FL (ref 9.4–12.4)
POTASSIUM SERPL-SCNC: 4.4 MMOL/L (ref 3.5–5.1)
PROT SERPL-MCNC: 7.2 G/DL (ref 6.4–8.3)
RBC # BLD AUTO: 4.76 M/UL (ref 4.7–6.1)
SODIUM SERPL-SCNC: 139 MMOL/L (ref 136–145)
URATE SERPL-MCNC: 4.7 MG/DL (ref 3.4–7)
WBC # BLD AUTO: 6.49 K/UL (ref 4.8–10.8)

## 2025-03-18 PROCEDURE — 80053 COMPREHEN METABOLIC PANEL: CPT

## 2025-03-18 PROCEDURE — 1159F MED LIST DOCD IN RCRD: CPT | Performed by: INTERNAL MEDICINE

## 2025-03-18 PROCEDURE — 85025 COMPLETE CBC W/AUTO DIFF WBC: CPT

## 2025-03-18 PROCEDURE — 1036F TOBACCO NON-USER: CPT | Performed by: INTERNAL MEDICINE

## 2025-03-18 PROCEDURE — 99212 OFFICE O/P EST SF 10 MIN: CPT

## 2025-03-18 PROCEDURE — 3017F COLORECTAL CA SCREEN DOC REV: CPT | Performed by: INTERNAL MEDICINE

## 2025-03-18 PROCEDURE — 1123F ACP DISCUSS/DSCN MKR DOCD: CPT | Performed by: INTERNAL MEDICINE

## 2025-03-18 PROCEDURE — 99213 OFFICE O/P EST LOW 20 MIN: CPT | Performed by: INTERNAL MEDICINE

## 2025-03-18 PROCEDURE — 83615 LACTATE (LD) (LDH) ENZYME: CPT

## 2025-03-18 PROCEDURE — 3078F DIAST BP <80 MM HG: CPT | Performed by: INTERNAL MEDICINE

## 2025-03-18 PROCEDURE — 3075F SYST BP GE 130 - 139MM HG: CPT | Performed by: INTERNAL MEDICINE

## 2025-03-18 PROCEDURE — G8417 CALC BMI ABV UP PARAM F/U: HCPCS | Performed by: INTERNAL MEDICINE

## 2025-03-18 PROCEDURE — 36415 COLL VENOUS BLD VENIPUNCTURE: CPT

## 2025-03-18 PROCEDURE — 84550 ASSAY OF BLOOD/URIC ACID: CPT

## 2025-03-18 PROCEDURE — 1125F AMNT PAIN NOTED PAIN PRSNT: CPT | Performed by: INTERNAL MEDICINE

## 2025-03-18 PROCEDURE — G8427 DOCREV CUR MEDS BY ELIG CLIN: HCPCS | Performed by: INTERNAL MEDICINE

## 2025-03-18 PROCEDURE — G2211 COMPLEX E/M VISIT ADD ON: HCPCS | Performed by: INTERNAL MEDICINE

## 2025-03-18 RX ORDER — MULTIVIT-MIN/IRON/FOLIC ACID/K 18-600-40
2000 CAPSULE ORAL DAILY
COMMUNITY

## 2025-06-12 DIAGNOSIS — I48.0 PAF (PAROXYSMAL ATRIAL FIBRILLATION) (HCC): Primary | ICD-10-CM

## 2025-06-17 ENCOUNTER — OFFICE VISIT (OUTPATIENT)
Dept: CARDIOLOGY CLINIC | Age: 75
End: 2025-06-17
Payer: MEDICARE

## 2025-06-17 VITALS
SYSTOLIC BLOOD PRESSURE: 142 MMHG | HEART RATE: 78 BPM | BODY MASS INDEX: 27.62 KG/M2 | HEIGHT: 67 IN | DIASTOLIC BLOOD PRESSURE: 78 MMHG | WEIGHT: 176 LBS

## 2025-06-17 DIAGNOSIS — I48.0 PAF (PAROXYSMAL ATRIAL FIBRILLATION) (HCC): Primary | ICD-10-CM

## 2025-06-17 PROCEDURE — 3078F DIAST BP <80 MM HG: CPT | Performed by: INTERNAL MEDICINE

## 2025-06-17 PROCEDURE — 3077F SYST BP >= 140 MM HG: CPT | Performed by: INTERNAL MEDICINE

## 2025-06-17 PROCEDURE — 1123F ACP DISCUSS/DSCN MKR DOCD: CPT | Performed by: INTERNAL MEDICINE

## 2025-06-17 PROCEDURE — 3017F COLORECTAL CA SCREEN DOC REV: CPT | Performed by: INTERNAL MEDICINE

## 2025-06-17 PROCEDURE — 93000 ELECTROCARDIOGRAM COMPLETE: CPT | Performed by: INTERNAL MEDICINE

## 2025-06-17 PROCEDURE — G8428 CUR MEDS NOT DOCUMENT: HCPCS | Performed by: INTERNAL MEDICINE

## 2025-06-17 PROCEDURE — G8417 CALC BMI ABV UP PARAM F/U: HCPCS | Performed by: INTERNAL MEDICINE

## 2025-06-17 PROCEDURE — 99213 OFFICE O/P EST LOW 20 MIN: CPT | Performed by: INTERNAL MEDICINE

## 2025-06-17 PROCEDURE — 1036F TOBACCO NON-USER: CPT | Performed by: INTERNAL MEDICINE

## 2025-06-17 NOTE — PROGRESS NOTES
[facility]  [address]  [address]  [facility] [address]  [phone]    Sage Partida is a 74 y.o. male presents with atrial fibrillation.  He has not had any significant arrhythmia.  Overall he feels well.      Review of Systems   Constitutional: Negative for fever, chills, diaphoresis, activity change, appetite change, fatigue and unexpected weight change.   Eyes: Negative for photophobia, pain, redness and visual disturbance.   Respiratory: Negative for apnea, cough, chest tightness, shortness of breath, wheezing and stridor.    Cardiovascular: Negative for chest pain, palpitations and leg swelling.   Gastrointestinal: Negative for abdominal distention.   Genitourinary: Negative for dysuria, urgency and frequency.   Musculoskeletal: Negative for myalgias, arthralgias and gait problem.   Skin: Negative for color change, pallor, rash and wound.   Neurological: Negative for dizziness, tremors, speech difficulty, weakness and numbness.   Hematological: Does not bruise/bleed easily.   Psychiatric/Behavioral: Negative.          Social History     Socioeconomic History    Marital status:      Spouse name: Not on file    Number of children: Not on file    Years of education: Not on file    Highest education level: Not on file   Occupational History    Not on file   Tobacco Use    Smoking status: Former     Current packs/day: 0.00     Average packs/day: 2.0 packs/day for 15.0 years (30.0 ttl pk-yrs)     Types: Cigarettes     Start date:      Quit date:      Years since quittin.4    Smokeless tobacco: Never   Vaping Use    Vaping status: Never Used   Substance and Sexual Activity    Alcohol use: Yes     Comment: Occasional     Drug use: No    Sexual activity: Not Currently   Other Topics Concern    Not on file   Social History Narrative    Not on file     Social Drivers of Health     Financial Resource Strain: Low Risk  (2024)    Overall Financial Resource Strain (CARDIA)

## 2025-07-09 NOTE — PROGRESS NOTES
MEDICAL ONCOLOGY PROGRESS NOTE                                                          Sage Partida   1950  3/18/2025     Chief Complaint   Patient presents with    Follow-up     Diffuse large B-cell lymphoma of intrathoracic lymph nodes       HISTORY OF PRESENT ILLNESS:  The patient presents today for a surveillance follow-up visit for a diagnosis of diffuse large B-cell lymphoma, ABC phenotype.  He is a status post completion of treatment with 6 cycles of R-CHOP with G-CSF support.  He completed treatment 7/28/2022.  Patient denies any new complaints today.  Denies any fever.  Denies any night sweats.  Denies any weight loss.  He is feeling very weak good.  Denies any abdominal pain.    Diagnosis  Diffuse large B-cell lymphoma, March 2022  c-Myc, BCL6-negative  BCL2 -positive rearrangement  ABC phenotype  Stage IIIB    Treatment summary  4/8/2022- 7/28/22 Completed R-CHOP + GCSF every 3 weeks x 6 cycles    Hematology history  Sage Partida was first seen by me on 3/24/2022.  The patient was referred by his primary care provider for findings of generalized lymphadenopathy.  The patient has had symptoms of weight loss, night sweats and low-grade fever.  This seems has been going on for many months now.  Patient had Covid 19 infection last year.  He had CT scans that showed generalized adenopathy.  3/22/2022-CT soft tissue neck showed findings of cervical adenopathy identified, including a right level 3 node measuring 1.6 cm in greatest axial diameter (series 4-image 95). There is a left level 5 lymph node measuring 1.7 cm on axial image 102. Multiple enlarged left level 4 nodes, including a 2.3 cm node on axial image 114. Adenopathy extends down into the left supraclavicular fossa and there is bulky adenopathy also appreciated in the upper mediastinum.  3/22/2022-CT chest with contrast showed findings of mediastinal adenopathy. Subcarinal lymph nodes are present. Right hilar lymph nodes are present.  15

## (undated) DEVICE — SUTURE VICRYL + SZ 3-0 L27IN ABSRB UD L26MM SH 1/2 CIR VCP416H

## (undated) DEVICE — GLOVE SURG SZ 7 CRM LTX FREE POLYISOPRENE POLYMER BEAD ANTI

## (undated) DEVICE — ADHESIVE SKIN CLSR 0.7ML TOP DERMBND ADV

## (undated) DEVICE — SUT ETHLN 5/0 P3 18IN 698H

## (undated) DEVICE — PACK,UNIVERSAL,NO GOWNS: Brand: MEDLINE

## (undated) DEVICE — STANDARD HYPODERMIC NEEDLE,POLYPROPYLENE HUB: Brand: MONOJECT

## (undated) DEVICE — GLV SURG BIOGEL M LTX PF 7 1/2

## (undated) DEVICE — DRSNG BRDR MEPILEXLITE SLFADHR SIL 2X5

## (undated) DEVICE — NEPTUNE E-SEP SMOKE EVACUATION PENCIL, COATED, 70MM BLADE, ROCKER SWITCH: Brand: NEPTUNE E-SEP

## (undated) DEVICE — PACK SURG CUST MAJOR BSIN SETUP

## (undated) DEVICE — SUTURE MCRYL SZ 4-0 L18IN ABSRB UD L19MM PS-2 3/8 CIR PRIM Y496G

## (undated) DEVICE — PK TURNOVER RM ADV

## (undated) DEVICE — PROVE COVER: Brand: UNBRANDED

## (undated) DEVICE — SUT SILK 2/0 SH CR8 18IN CR8 C012D

## (undated) DEVICE — SUTURE MONOCRYL SZ 4-0 L18IN ABSRB UD L19MM PS-2 3/8 CIR PRIM Y496G

## (undated) DEVICE — DRESSING TRNSPAR W5XL4.5IN FLM SHT SEMIPERMEABLE WIND

## (undated) DEVICE — SUTURE PDS II SZ 3-0 L27IN ABSRB CLR SH L26MM 1/2 CIR TAPR Z416H

## (undated) DEVICE — SYRINGE, LUER LOCK, 10ML: Brand: MEDLINE

## (undated) DEVICE — PENCIL BTTN S S CAUT TIP W HOLSTER 25 50

## (undated) DEVICE — BLADE SURG NO11 C STL RETRCT DISPOSABLE

## (undated) DEVICE — PK ENT HD AND NK 30

## (undated) DEVICE — PAD, GROUNDING, UNIVERSAL, SPLIT, 9': Brand: MEDLINE

## (undated) DEVICE — LIQUIBAND RAPID ADHESIVE 36/CS 0.8ML: Brand: MEDLINE

## (undated) DEVICE — MINOR CDS: Brand: MEDLINE INDUSTRIES, INC.

## (undated) DEVICE — DECANTER VI VENT W/ VLV FOR ASEP TRNSF OF FLD

## (undated) DEVICE — ROYAL SILK SURGICAL GOWN, XXL: Brand: CONVERTORS

## (undated) DEVICE — SUT SILK 3/0 SUTUPAK TIES 24IN SA74H

## (undated) DEVICE — PENCIL SMK EVAC 10 FT BLADE ELECTRD ROCKER FOR TELSCP

## (undated) DEVICE — VAGINAL PREP TRAY: Brand: MEDLINE INDUSTRIES, INC.

## (undated) DEVICE — LINE GAS SAMPLING INTEGR NSL SINGLE PC O2 ETCO2 FILTERLINE

## (undated) DEVICE — SUTURE VCRL SZ 3-0 L27IN ABSRB UD L26MM SH 1/2 CIR J416H

## (undated) DEVICE — ANTIBACTERIAL UNDYED BRAIDED (POLYGLACTIN 910), SYNTHETIC ABSORBABLE SUTURE: Brand: COATED VICRYL

## (undated) DEVICE — SKIN MARKER,REGULAR TIP WITH RULER: Brand: DEVON

## (undated) DEVICE — DRAPE,UTILITY,XL,4/PK,STERILE: Brand: MEDLINE

## (undated) DEVICE — SUT MNCRYL 4/0 P3 18IN UD MCP494G